# Patient Record
Sex: MALE | Race: WHITE | NOT HISPANIC OR LATINO | Employment: OTHER | URBAN - METROPOLITAN AREA
[De-identification: names, ages, dates, MRNs, and addresses within clinical notes are randomized per-mention and may not be internally consistent; named-entity substitution may affect disease eponyms.]

---

## 2017-01-10 ENCOUNTER — HOSPITAL ENCOUNTER (OUTPATIENT)
Dept: CARDIAC REHAB | Facility: CLINIC | Age: 62
End: 2017-01-10
Attending: PHYSICIAN ASSISTANT
Payer: COMMERCIAL

## 2017-01-10 PROCEDURE — 93798 PHYS/QHP OP CAR RHAB W/ECG: CPT

## 2017-01-10 PROCEDURE — 40000116 ZZH STATISTIC OP CR VISIT

## 2017-01-12 ENCOUNTER — HOSPITAL ENCOUNTER (OUTPATIENT)
Dept: CARDIAC REHAB | Facility: CLINIC | Age: 62
End: 2017-01-12
Attending: PHYSICIAN ASSISTANT
Payer: COMMERCIAL

## 2017-01-12 PROCEDURE — 40000116 ZZH STATISTIC OP CR VISIT: Performed by: REHABILITATION PRACTITIONER

## 2017-01-12 PROCEDURE — 93798 PHYS/QHP OP CAR RHAB W/ECG: CPT | Performed by: REHABILITATION PRACTITIONER

## 2017-01-17 ENCOUNTER — HOSPITAL ENCOUNTER (OUTPATIENT)
Dept: CARDIAC REHAB | Facility: CLINIC | Age: 62
End: 2017-01-17
Attending: PHYSICIAN ASSISTANT
Payer: COMMERCIAL

## 2017-01-17 PROCEDURE — 40000116 ZZH STATISTIC OP CR VISIT: Performed by: REHABILITATION PRACTITIONER

## 2017-01-17 PROCEDURE — 93798 PHYS/QHP OP CAR RHAB W/ECG: CPT | Performed by: REHABILITATION PRACTITIONER

## 2017-01-19 ENCOUNTER — HOSPITAL ENCOUNTER (OUTPATIENT)
Dept: CARDIAC REHAB | Facility: CLINIC | Age: 62
End: 2017-01-19
Attending: PHYSICIAN ASSISTANT
Payer: COMMERCIAL

## 2017-01-19 PROCEDURE — 93798 PHYS/QHP OP CAR RHAB W/ECG: CPT | Performed by: REHABILITATION PRACTITIONER

## 2017-01-19 PROCEDURE — 40000116 ZZH STATISTIC OP CR VISIT: Performed by: REHABILITATION PRACTITIONER

## 2017-01-19 ASSESSMENT — 6 MINUTE WALK TEST (6MWT)
GENDER SELECTION: MALE
TOTAL DISTANCE WALKED (FT): 1563

## 2017-01-24 ENCOUNTER — HOSPITAL ENCOUNTER (OUTPATIENT)
Dept: CARDIAC REHAB | Facility: CLINIC | Age: 62
End: 2017-01-24
Attending: PHYSICIAN ASSISTANT
Payer: COMMERCIAL

## 2017-01-24 PROCEDURE — 93798 PHYS/QHP OP CAR RHAB W/ECG: CPT | Performed by: REHABILITATION PRACTITIONER

## 2017-01-24 PROCEDURE — 40000116 ZZH STATISTIC OP CR VISIT: Performed by: REHABILITATION PRACTITIONER

## 2017-01-31 ENCOUNTER — HOSPITAL ENCOUNTER (OUTPATIENT)
Dept: CARDIAC REHAB | Facility: CLINIC | Age: 62
End: 2017-01-31
Attending: PHYSICIAN ASSISTANT
Payer: COMMERCIAL

## 2017-01-31 PROCEDURE — 40000116 ZZH STATISTIC OP CR VISIT: Performed by: REHABILITATION PRACTITIONER

## 2017-01-31 PROCEDURE — 93798 PHYS/QHP OP CAR RHAB W/ECG: CPT | Performed by: REHABILITATION PRACTITIONER

## 2017-02-02 ENCOUNTER — HOSPITAL ENCOUNTER (OUTPATIENT)
Dept: CARDIAC REHAB | Facility: CLINIC | Age: 62
End: 2017-02-02
Attending: PHYSICIAN ASSISTANT
Payer: COMMERCIAL

## 2017-02-02 VITALS — BODY MASS INDEX: 30.39 KG/M2 | HEIGHT: 69 IN | WEIGHT: 205.2 LBS

## 2017-02-02 PROCEDURE — 40000575 ZZH STATISTIC OP CARDIAC VISIT #2: Performed by: REHABILITATION PRACTITIONER

## 2017-02-02 PROCEDURE — 40000116 ZZH STATISTIC OP CR VISIT: Performed by: REHABILITATION PRACTITIONER

## 2017-02-02 PROCEDURE — 93797 PHYS/QHP OP CAR RHAB WO ECG: CPT | Performed by: REHABILITATION PRACTITIONER

## 2017-02-02 PROCEDURE — 93798 PHYS/QHP OP CAR RHAB W/ECG: CPT | Performed by: REHABILITATION PRACTITIONER

## 2017-02-02 ASSESSMENT — 6 MINUTE WALK TEST (6MWT)
GENDER SELECTION: MALE
TOTAL DISTANCE WALKED (FT): 1563
TOTAL DISTANCE WALKED (FT): 2029
FEMALE CALC: 1524.97
MALE CALC: 1779.81
PREDICTED: 1790.67

## 2017-02-02 NOTE — PROGRESS NOTES
Issac Jackson 61 year old 1955 02/02/17 0900   Session   Session Discharge Note   Certified through this date 03/04/17   Discharge Note  2/2/17 Pt progressed well. His HR was close to or over his 85% HRmax off and on throughout the program, which limited Pt's ability to progress as much as he wanted. Pt was told to consult with MD about his ability to increase exercise with elevated HR. Pt admitted to having a moderate amount of stress from work and he wanted to learn how to manage this better. Pt attended stress and relaxation class and felt he now knows how to practice relaxation techniques better. He is planning on implementing a few strategies that he learned in rehab. Pt started off with a MET level of 3.3, and reached a MET level of 5.6 during his time in rehab. He plans to exercise 6 days a week for 60 minutes at his home gym. Pt was talked to about exercise guidelines, appropriate progression of exercise, and safety when working out. Pt has lost 5# since starting at rehab and is confident that he will be able to continue to lose weight and meet his long term goal of 190#. Pt's spouse is a good support system and manages his meals and portion sizes. Pt feels he will be able to adhere to this diet and continue to eat smaller portions sizes. Pt has not had a lipid panel done since his event and he was advised to follow up with his MD about having this done within the next month or two.     Cardiac Rehab Assessment 11/17/2016 Evaluation completed. PT  had an inferior MI in early October with a stent to his RCA, About one month later he had a stent in his LAD. During his last procedure both groins were used for the angiogram. He is still sore and does have some bruising. Denies any angina. PT is very motivated to make lifestyle changes to improve his health.  He is actively working on weight loss and has started to exercise at home.  12/1/16  ITP completed today.  PT reports having palpitations he denies  "angina however does have what he calls \"Heart soreness\"  He reports that he had the same feeling after his 1st stent. PT to continue to monitor this symptom.    1-19-17  PT continues to progress his exercise tolerance.  He has asked appropriat questions to understand his training regimine.  He has learned that his HR, effort and breath matter in his exercise tolerance and are used in progression.  PT now wears a heart rate montiro and adjusts his levles of exercise accordingly.  He is now doing intervals in rehab to assist with HR control.  His MD did increase his metoroprolol to 50 mg once daily. from 25mg once daily and encouraged patient to continue to keep his HR at or less than 135 bpm.  PT admits that he is controlling his stress levels better.  He is utilizing deep breathing for relaxation. PT has lost wt consistnely about 2 # per week.  He does admit that he is \"hungry.  We discussed increasing his protein amounts and to reevaluate if he is eating enough calories thorughout the day.  PT denies skipping any meals.  His wife is supportive and keeps a close eye on him.  PT is consistently getting in his home exercise up to 3-4 days per week to average 5-6 days per week.  He is tkaing his one day off per week for rest.  PT is preparing for DC in the next two weeks.  [facility] monitro[facility] and teaching of protocol for home after rehab is done.  2/2/17.  Discharge today.   General Information   Treatment Diagnosis Stent   Date of Treatment Diagnosis 11/08/16   Secondary Treatment Diagnosis STEMI;Systolic Heart Failure with Lowered EF   Significant Past CV History None   Lead up symptoms Chest pain that radiated to left shoulder   Hospital Location [facility]   Hospital Discharge Date 11/08/16   Signs and Symptoms Post Hospital Discharge Other (see comments)  (Soreness in both groin areas post procedure )   Outpatient Cardiac Rehab Start Date 11/17/16 " "  Primary Physician Jim Maurice; Tristan   Primary Physician Follow Up Advised to schedule appointment   Cardiologist Krishna Torres   Cardiologist Follow Up Scheduled   Ejection Fraction 45% %   Risk Stratification Moderate   Summary of Cath Report   Summary of Cath Report Available   Date Performed 11/08/16   LAD 80% mid-GOLDEN   % chronic total occlusion   RCA Stent patent in proximal-placed GOLDEN 10/5/2016-inferior MI: RPLA branch 100% occlusion fills via right to right collaterals   Living and Work Status    Living Arrangements and Social Status house;spouse   Return to Employment Yes   Occupation    Preventative Medications   CMS recommended medications Antiplatelets;Ace inhibitors;Beta Blocker;Lipid Lowering;Influenza vaccination   Falls Screen   Have you fallen two or more times in the past year? No   Have you fallen and had an injury in the past year? No   Pain   Patient Currently in Pain No   Physical Assessments   Incisions WNL   Edema None   Right Lung Sounds normal   Left Lung Sounds normal   Limitations No limitations   Comments Occasional knee pain, bursitis left shoulder   Individualized Treatment Plan   Monitored Sessions Scheduled 18   Monitored Sessions Attended 17   Oxygen   Supplemental Oxygen needed No   Nutrition Management - Weight Management   Assessment Discharge   Age 61   Weight 93.078 kg (205 lb 3.2 oz)   Height 1.753 m (5' 9.02\")   BMI (Calculated) 30.35   Goal Weight 86.183 kg (190 lb)   Initial Rate Your Plate Score. Dietary tool to assess eating patterns. Scores range from 24 to 72. The higher the score the healthier the eating pattern. 58   Discharge Rate Your Plate Score 64   Weight Management Comments PT has lost 15# since his MI-he is eating alot less.  12/1/16  PT has now lost 20# since his MI.  He is eating smaller portions, eating heart healthy. He admittted initially he was not eating enough and exercising hard.  He did not realize that he was pushing too " hard until he came to rehab and his HR was higher than recommneded with exercise.  2/2/17. Pt has lost a total of 5# since the start of rehab. His long term goal weight is 190# and he is continuing to work towards this goal outside of rehab. He recently went on vacation over the weekend and gained 2# but he is planning on exercising 6 days a week for 60 minutes to help him achieve his goal weight.     Nutrition Management - Lipids   Lipids Labs Available   Date 10/05/16   Total Cholesterol 180   Triglycerides 85   HDL 39      Prescribed Lipid Medication Yes   Statin Intensity High Intensity   Lipid Comments 12/1/16  Discussed CHOL values, and benefits of medications beyond lowring the numbers. 12/20/16.  No questions about cholesterol 2/2/17. Pt has not had a recent lipid panel done since his event. It was suggested that he consult with his  about getting this done within the next month or two in order to see how the statin has been working.    Nutrition Management - Diabetes   Diabetes No   Nutrition Management Summary   Dietary Recommendations Low Fat;Low Cholesterol;Low Sodium   Stages of Change for Diet Compliance Maintenance   Interventions Planned Attend Nutrition Education Class(es);Instruct on Label Reading;Educate on Weight Management Principles;Complete Food Diary   Interventions In Progress or Completed Understands Weight Management Principles;Educated on Benefits of Exercise   Patient Goals Goal #1   Goal #1 Description PT will lose 10# by making dietary changes and exercising.   Goal #1 Target Date 01/17/17   Goal #1 Progress Towards Goal 12/1/16  [facility]  He reports he lost most of his wt between stent 1 and 2.  PT feels that once he is safe to exercise harder the wt will come off again.  he is looking to get under 200#.  12/20/16.  Pt has lost abou 5 ounds since starting [facility].  He states his wife is doing the shopping and preparing ,meals.  He  understands label reading and is aware of the foods his wife is purchasing. Diet changes include eating smaller portions, more fish and chicken, decreasing sodium and fat intake.  He is planning to attend nutrition education classes and will consider individual consultation. 2/2/17. Pt has lost a total of 5# since the start of rehab. His target weight is 190# and he is going to work towards this goal by exercising 6 days a week for 60minutes, and continuing to eat healthy portion sizes.  He feels confident that he will achieve this goal.   Nutrition Summary Comments Wife has purchased 3-4 heart healthy cookbooks and she is limiting his portions. 2/2/17. Pt's wife is continuing to manage his diet and he has been compliant thus far and believes he will be able to maintain this diet.    Nutrition Target Outcome Weight loss .5-1 lb/week (if BMI > 25);Total Chol < 150, HDL > 40 (M), HDL > 50 (W), LDL < 70, Trig < 150   Psychosocial Management   Psychosocial Assessment Discharge   Is there history of clinical depression or increased risk of depression? No previous history   Current Level of Stress per Patient Report Moderate    Current Coping Skills Has Positive Support System;Other (see comments)  (Pt is going to try some stress management techniques. )   Initial Patient Health Questionnaire -9 Score (PHQ-9) for depression. 5-9 Minimal symptoms, 10-14 Minor depression, 15-19 Major depression, moderately severe, > 20 Major depression, severe  1   Discharge PHQ-9 Score for Depression 0   Initial [employer] COOP Survey score.  Quality of Life:   If total score > 25 review individual areas where patient rated a 4 or 5.  Consider patients current medical condition and what role that plays on the score.   Adjust treatment protocol to improve areas of concern.  Consider the following:  PHQ9 score, DASI, and re-assessment within the next 30 days to assist with developing treatments.  18   Discharge [employer] COOP Survey Score 13    Stages of Change Preparation   Interventions Planned Patient to verbalize understanding of negative impact of stress to personal health;Patient interested in implementing one strategy to reduce current level of stress/anxiety;Patient to verbalize understanding of behavioral assessment results;Patient to attend stress management class(es)   Interventions In Progress or Completed Patient verbalizes understanding of behavioral assessment scores;Patient verbalizes understanding of negative impact of stress to personal health;Patient attended stress management class(es);Patient is making progress with personal goal to reduce stress/anxiety;Patient was given resources for stress relaxation   Patient Goal Yes   Goal Description PT would like to learn at least one way to better manage his stress levels by attending the relaxation class.   Goal Target Date 12/30/16   Goal Date Met 02/02/17   Progress Towards Goal 12/1/16  PT was given relaxation techniques handout today.  We discussed stress affects on the body and the different nervous systems and goal of establishing both ot them.   PT admits that he is a worrier.  12/20/16.  Did not address.  However, PT did state that everything is going well. 2/2/17. Pt attended the stress management class and admitted that meditation did not seem like a good fit. Pt was given other relaxation tools and techniques to practice outside of rehab. He said he will try to use stretching as a stress management tool.  Although he learned relaxation techniques, he has not put any of them into practice   Psychosocial Comments PT has some anxiety and stress. Stress is job related and he tends to be worrier. 2/2/17 Pt is going to try to manage stress via stretching and taking time for himself.    Psychosocial Target Outcome Maximize coping skills;Identify absence or presence of depression using valid screening tool   Other Core Components - Hypertension   History of or Diagnosis of Hypertension  Yes   Currently taking Anti-Hypertensives Yes;Beta blocker;Ace Inhibitor   Hypertension Comments 11/17/2016 PT reports his BP tends to be higher when he initially gets it checked.  12/0/16  BP steypcially in the high 120's low 130s.   645 am question if medications are fully in system.  12/20/16.  At times BP is mildly elevated   Continuing to monitor BP nad will follow-up with MD as appropriate. 2/2/17. Pt to talk with MD about medication dose and whether or not his BP should be lower than 140/80.   Other Core Components - Tobacco   History of Tobacco Use Yes   Quit Date or Planned Quit Date (quit in 1996)   Tobacco Use Status Former (Quit > 6 mo ago)   Tobacco Habit Cigarettes   Tobacco Use per Day (average) 2   Years of Tobacco Use 25   Stages of Change Maintenance   Tobacco Comments 12/1/16  No issues with maintenance. 2/2/17 Pt is continuing to abstain and has quit the nicotine gum since his event.    Other Core Components Summary   Interventions Planned Instruct patient on the DASH diet;List benefits of weight management;Educate on importance of maintaining low sodium diet   Interventions In Progress or Completed Listed benefits of weight management;Instructed on the importance of limiting alcohol intake   Patient Goals No   Other Core Components Target Outcome BP < 140/90 or < 130/80 with DM or CKD   Activity/Exercise History   Activity/Exercise Assessment Discharge   Activity/Exercise Status prior to event? Participated in an Exercise Program   Number of Days Currently participating in Moderate Physical Activity? 3-5   Number of Days Currently performing  Aerobic Exercise (including rehab)? 4-6   Number of Minutes per Session Currently of Aerobic Exercise (average)? 20-40   Current Stage of Change (Physical Activity) Action   Current Stage of Change (Aerobic Exercise) Maintenance   Patient Goals Goal #1;Goal #2   Goal #1 Description 12/20/16  Goal adjusted to: learn about safe parameters for exercise.   Goal  #1 Target Date 12/30/16   Goal #1 Date Met 02/02/17   Goal #1 Progress Towards Goal 12/1/16 PT was running up to 2 days prior to his MI>  We discussed at this time keepig his HR <130 bpm at home as well as in rehab.  Discussed not jogging at this time until 6 weeks post MI.  2/2/17. Pt progressed well and would like to do more, however his HR has been limiting him. Pt to continue to monitor HR and consult with MD about whether or not he can reach a HR above 85% HRmax safely.  Discussed safe exercise progression.   Goal #2 Description Participate in HIIT and learn and understand how to exercise safely this way.   Goal #2 Target Date 01/26/17   Goal #2 Progress Towards Goal 12/20/16.  This goal was established today.  Talked with PT about how HIIT works and the outcomes that can be ahieved by exercising this way.  He explained that previous to his heart event he would run for about 1 minute between strength training sets.  Had long discussion about strength training paameters and progression as well as how HIIT is performed.  Therapist will review policy and start HIIT training with patient per protocol. 2/2/17 Pt understands HIIT and how to safely perform it. Pt suggested to talk with his MD about increasing to HIIT once he is able to and his HR is under control.    Activity/Exercise Comments PT has a home treadmill and walked 3-4 days per week for 30 minutes previously. When he started to have chest pain he would exercise to stay below the threshold for angina. 2/2/17 Pt is planning on exercising at his home gym and increasing to 6 days a week for 60minutes a day.    Activity/Exercise Target Outcome An Accumulation of 150  Minutes of Aerobic Activity per Week   Exercise Assessment   6 Minute Walk Predicted - Gender Selection Male   6 Minute Walk Predicted (Male) 1779.81   6 Minute Walk Predicted (Female) 1524.97   Initial 6 Minute Walk Distance (Feet) 1563 ft   Discharge 6 Minute Walk Distance (Feet) 2029   Resting  HR 87 bpm   Exercise  bpm   Post Exercise HR 96 bpm   Resting /80 mmHg   Exercise /86 mmHg   Post Exercise /80 mmHg   Effort Rating 5   Current MET Level 5.6   MET Level Goal 6-7   ECG Rhythm Sinus rhythm   Ectopy PVCs  (Rare PVCs)   Current Symptoms Denies symptoms   Limitations/Restrictions None   Exercise Prescription   Mode Treadmill;Weights   Duration/Time 30-45 min   Frequency 3 daysweek   THR (85% of age predicted max HR) 134.3   OMNI Effort Rating (0-10 Scale) 4-6/10   Progression Continuous bouts;Total exercise time of 30-45 minutes;Aerobic exercise to OMNI rating of 6 or below and at or below THR;Progress peak intensity by 1/2 MET per week   Comments 12/20/16  Start HIIT per protocol.  PT will also continue with strength training at home. 2/2/17 PT discharged today.    Recommended Home Exercise   Type of Exercise Walking;Treadmill;Weights   Frequency (days per week) 4-6   Duration (minutes per session) 45-60 min aerobic exercise   Effort Rating Recommended 4-6/10   30 Day Exercise Plan PT start with 15 minutes of walking at up to 3 mph and increase to 30 minutes over the next 5-7 days.   12/1/16  PT to achieve 4-6 days of exercise inclduing what is completed in rehab per week   12/20/16.  Progressing home exercsie along with rehab progression. 2/2/17 Pt to exercise at home 4-6 days a week for 30-60 minutes a day. Pt to monitor HR, and breathing rate during exercise to know how hard he should exercise.     Current Home Exercise   Type of Exercise Walking;Weights;Treadmill   Frequency (days per week) 6   Duration (minutes per session) 60   Follow-up/On-going Support   Provider follow-up needed on the following Heart Rate   Learning Assessment   Learner Patient   Primary Language English   Preferred Learning Style Reading;Demonstration   Barriers to Learning No barriers noted   Patient Education   Education recommended Anatomy and Physiology of the Heart;Blood Pressure;Exercise  Principles;Medication Overview;Muscle Conditioning;Nutrition;Stress Management;Weight Loss   Education classes attended Stress Management   I have established, reviewed and made necessary changes to the individualized treatment plan and exercise prescription for this patient.    Physician Name (printed): ________________________   Date: _______  Time: ______    Physician Signature: ___________________________________________

## 2023-01-03 ENCOUNTER — HOSPITAL ENCOUNTER (EMERGENCY)
Facility: CLINIC | Age: 68
Discharge: HOME | End: 2023-01-03
Attending: EMERGENCY MEDICINE | Admitting: EMERGENCY MEDICINE
Payer: COMMERCIAL

## 2023-01-03 VITALS
TEMPERATURE: 97.3 F | SYSTOLIC BLOOD PRESSURE: 191 MMHG | RESPIRATION RATE: 18 BRPM | BODY MASS INDEX: 29.52 KG/M2 | OXYGEN SATURATION: 98 % | DIASTOLIC BLOOD PRESSURE: 105 MMHG | HEART RATE: 64 BPM | WEIGHT: 200 LBS

## 2023-01-03 DIAGNOSIS — R04.0 EPISTAXIS: ICD-10-CM

## 2023-01-03 PROCEDURE — 99284 EMERGENCY DEPT VISIT MOD MDM: CPT | Mod: 25

## 2023-01-03 PROCEDURE — 30903 CONTROL OF NOSEBLEED: CPT | Mod: RT

## 2023-01-03 RX ORDER — OXYMETAZOLINE HYDROCHLORIDE 0.05 G/100ML
SPRAY NASAL
Status: DISCONTINUED
Start: 2023-01-03 | End: 2023-01-03 | Stop reason: HOSPADM

## 2023-01-03 RX ORDER — TRANEXAMIC ACID 100 MG/ML
500 INJECTION, SOLUTION INTRAVENOUS ONCE
Status: DISCONTINUED | OUTPATIENT
Start: 2023-01-03 | End: 2023-01-03 | Stop reason: HOSPADM

## 2023-01-03 ASSESSMENT — ACTIVITIES OF DAILY LIVING (ADL)
ADLS_ACUITY_SCORE: 35
ADLS_ACUITY_SCORE: 35

## 2023-01-03 NOTE — ED NOTES
Pt ambulated without difficult. Trickle of blood from nose since placement of rhino rocket, no increase from ambulation.

## 2023-01-03 NOTE — ED PROVIDER NOTES
History     Chief Complaint:  Epistaxis       HPI   Issac Jackson is a 67 year old male who presents with nose bleed.  Patient reports onset of nosebleed this morning.  He has had 1 prior nosebleed several years ago that resolved on its own.  He held pressure but continued to have a significant trickle.  No fall or trauma.  No recent cough, runny nose, sore throat.  He is not reported picking his nose.  He does report a history of whitecoat syndrome and has elevated blood pressures when he sees the doctor.  He denies any other symptoms at this time.      Independent Historian: Yes as above.    Review of External Notes: I reviewed cardiology clinic note from 12/13/2022.  It appears the patient is on Brilinta and aspirin. CBC within normal limits from this visit.      ROS:  Review of Systems   All other systems reviewed and are negative.    Allergies:  No known drug allergies    Medications:    Toprol XL  Lisinopril  Brilinta  Aspirin 81 mg  Nitroglycerin    Past Medical History:    Hypertension  Atherosclerosis of coronary artery  Ischemic cardiomyopathy  Stentded coronary artery  STEMI, right    Past Surgical History:   Hernia repair     Family History:    Father: Kidney/bladder disease, stroke  Mother: Cancer    Social History:      PCP: Clinic, [facility]     Physical Exam     Patient Vitals for the past 24 hrs:   BP Temp Temp src Pulse Resp SpO2 Weight   01/03/23 1126 (!) 191/105 -- -- 64 -- 98 % --   01/03/23 1111 (!) 203/104 -- -- -- -- -- --   01/03/23 1056 (!) 163/94 -- -- -- -- -- --   01/03/23 1041 (!) 173/98 -- -- -- -- -- --   01/03/23 1026 (!) 157/96 -- -- -- -- -- --   01/03/23 1011 (!) 185/102 -- -- -- -- -- --   01/03/23 1000 (!) 178/104 -- -- 67 -- -- --   01/03/23 0945 (!) 182/97 -- -- 68 -- -- --   01/03/23 0930 (!) 201/109 -- -- 67 -- -- --   01/03/23 0921 (!) 181/104 -- -- -- -- -- --   01/03/23 0906 (!) 196/99 -- -- -- -- -- --   01/03/23 0851 (!) 202/111 -- -- -- -- -- --    01/03/23 0836 (!) 204/110 -- -- -- -- -- --   01/03/23 0821 (!) 207/122 -- -- 71 -- -- --   01/03/23 0812 (!) 215/109 -- -- -- -- -- --   01/03/23 0811 -- 97.3  F (36.3  C) Temporal 81 18 97 % 90.7 kg (200 lb)        Physical Exam  General: Resting on the bed.  Head: No obvious trauma to head.  Ears, Nose, Throat:  External ears normal.  Nose normal.  Posterior oral pharynx with mild blood noted.  Midline uvula.  right nare with blood no focal site of bleeding.  Left nare clear.    Eyes:  Conjunctivae clear.  Pupils are equal, round, and reactive.   Neck: Normal range of motion.  Neck supple.   CV: Regular rate and rhythm.  No murmurs.      Respiratory: Effort normal and breath sounds normal.  No wheezing or crackles.   Gastrointestinal: Soft.  No distension. There is no tenderness.    Neuro: Alert. Moving all extremities appropriately.  Normal speech.    Skin: Skin is warm and dry.  No rash noted.     Emergency Department Course     Procedures       Epistaxis Care     Procedure: Epistaxis Care    Indication: Epistaxis    Consent: Verbal    Medication: Patient was topically medicated with Tranexamic acid, Topical lidocaine and Oxymetazoline    Procedure detail: Pre-wetted Rapid Rhino (balloon) was gently inserted and insufflated. Anterior rapid rhino.   Patient was closely monitored and did not have evidence of recurrent bleeding.     Patient Status: The patient tolerated the procedure well: Yes. There were no complications.    Emergency Department Course & Assessments:       Interventions:  Medications   oxymetazoline (AFRIN) 0.05 % spray (has no administration in time range)   tranexamic acid (CYKLOKAPRON) spray 500 mg (has no administration in time range)        Independent Interpretation (X-rays, CTs, rhythm strip):  N/A    Consultations/Discussion of Management or Tests:  0825  I met with patient to review history and plan    0915  I rechecked the patient, bleeding slowed    1010  I placed TXA in the nose    1110 I rechecked the patient and placed packing in the patient's nose.  1150 I rechecked the patient and prepared for discharge.    Social Determinants of Health affecting care:  N/A    Disposition:  The patient was discharged to home.     Impression & Plan      Medical Decision Makin-year-old male with history of CAD on Brilinta and aspirin presents with a nosebleed.  Patient is hypertensive, he is high blood pressure.  He reports compliance with his medication.  He states that he does get whitecoat syndrome and typically experiences elevated blood pressures in the ER.  He is currently asymptomatic.  He did not endorse any symptoms to suggest hypertensive urgency or emergency.  His chief complaint is a nosebleed.  Broad differential was pursued including but not limited to coagulopathy, trauma, tumor, mass, bleed, etc.  Patient is well-appearing nontoxic.  CBC less than 3 weeks ago was reassuring with normal platelets and hemoglobin.  He has not any anticoagulants outside of Brilinta and aspirin.  There is no signs of trauma.  No obvious mass on examination.  Patient has just been oozing from the right nare.  Attempted Afrin, TXA topically without significant change in symptoms.  Therefore opted to place Rhino Rocket which patient tolerated quite well.  Following placement there was no further bleeding.  Patient was given Augmentin for discharge.  Advised to follow-up with ENT in the next 2 to 3 days.  Return precautions were given.  Patient is agreeable this plan and felt comfortable plan for discharge home.      Diagnosis:    ICD-10-CM    1. Epistaxis  R04.0            Discharge Medications:  New Prescriptions    AMOXICILLIN-CLAVULANATE (AUGMENTIN) 875-125 MG TABLET    Take 1 tablet by mouth 2 times daily for 5 days      Scribe Disclosure:  Andre TILLEY, am serving as a scribe on 1/3/2023 at 11:50 AM to personally document services performed by Yandy Alexander MD based on my observations  and the provider's statements to me.     1/3/2023   Yandy Alexander MD Bennett, Jennifer L, MD  01/03/23 3307

## 2023-01-03 NOTE — ED TRIAGE NOTES
A&O x4, ABCs intact. Pt presents with nose bleed that started at 0700 this morning. Pt states that he is on blood thinner.        Triage Assessment     Row Name 01/03/23 0811       Triage Assessment (Adult)    Airway WDL WDL       Respiratory WDL    Respiratory WDL WDL       Cardiac WDL    Cardiac WDL WDL

## 2023-01-03 NOTE — DISCHARGE INSTRUCTIONS
"Please watch for signs of continued bleeding and be careful to leave packing in place.  If you have worsening pain, headaches, vision changes, sinus pain, purulent drainage or bleeding recurrence, return to the ED.  You are prescribed a antibiotic to take as long as the packing is in place.      Call ENT today to make a follow up appointment in 2-3 days    Discharge Instructions  Epistaxis    Today you were seen for a nosebleed.   Nosebleeds (the medical term is \"epistaxis\") are very common. Almost every person has had at least one in their lifetime.  Although the amount of blood loss can appear dramatic, nosebleeds rarely cause serious problems.  The most common causes are dry air or nose picking, but they also are common in people who have allergies, high blood pressure, or are on blood thinners (such as Coumadin, Aspirin or Plavix). If you or your child gets a nosebleed, the important thing is to know how to take care of it. With the right self-care, most nosebleeds will stop on their own.    Generally, every Emergency Department visit should have a follow-up clinic visit with either a primary or a specialty clinic/provider. Please follow-up as instructed by your emergency provider today.    Return to the Emergency Department if:  Your nose is bleeding a very large amount of blood and you are unable to stop it.  You get very pale, faint, or tired.  You cannot get the bleeding to stop after following these instructions.    Treatment:  Your provider may tell you to use a decongestant nose spray, like Afrin  (oxymetazoline), in both nostrils in the morning and at night for the next three days. Do not use this medicine for more than three days at a time.  If you do, it will cause nasal congestion.   Use a moisturizer. A small amount of Vaseline  to the inside of your nostrils for moisture before bed is one option. There are nasal sprays available over-the-counter for this purpose as well.  Using a humidifier in your " bedroom or home will help as well when the air is dry.  For the next three days, do not blow your nose or put anything in your nose. You may sniffle, or dab the outside of your nose.  Do not bend with your head below your waist for the next three days. Do not lift anything so heavy that you have to strain.   If you received nasal packing, please do not remove the packing until seen by an Ear, Nose, and Throat (ENT) specialist.  If antibiotics have been given with the packing, please take as directed.    If your nose starts to bleed again:  Blow your nose to get rid of some of the clots that have formed inside your nostrils. This may increase the bleeding temporarily, but that is okay.  Spray decongestant nose spray (like Afrin ) into both nostrils to constrict the blood vessels.  Sit or stand while bending forward slightly at the waist. Do not lie down or tilt your head back. This may cause you to swallow blood and can lead to vomiting.   the soft part of BOTH nostrils at the bottom of your nose and squeeze your nose closed for at least 5 minutes (for children) or 10 to 15 minutes (for adults). Use a clock to time yourself. Do not release the pressure every so often to check whether the bleeding has stopped. Many people hurt their chances of stopping the bleeding by releasing the pressure too soon or too often.    If you follow the steps outlined above, and your nose continues to bleed, repeat all the steps once more. Apply pressure for a total of at least 30 minutes. If you continue to bleed even then, seek medical attention.  If you were given a prescription for medicine here today, be sure to read all of the information (including the package insert) that comes with your prescription.  This will include important information about the medicine, its side effects, and any warnings that you need to know about.  The pharmacist who fills the prescription can provide more information and answer questions you may  have about the medicine.  If you have questions or concerns that the pharmacist cannot address, please call or return to the Emergency Department.   Remember that you can always come back to the Emergency Department if you are not able to see your regular provider in the amount of time listed above, if you get any new symptoms, or if there is anything that worries you.

## 2023-04-01 ENCOUNTER — HEALTH MAINTENANCE LETTER (OUTPATIENT)
Age: 68
End: 2023-04-01

## 2023-11-09 ENCOUNTER — HOSPITAL ENCOUNTER (EMERGENCY)
Facility: CLINIC | Age: 68
Discharge: HOME | End: 2023-11-10
Attending: EMERGENCY MEDICINE | Admitting: EMERGENCY MEDICINE
Payer: COMMERCIAL

## 2023-11-09 ENCOUNTER — APPOINTMENT (OUTPATIENT)
Dept: GENERAL RADIOLOGY | Facility: CLINIC | Age: 68
End: 2023-11-09
Attending: EMERGENCY MEDICINE
Payer: COMMERCIAL

## 2023-11-09 ENCOUNTER — APPOINTMENT (OUTPATIENT)
Dept: CT IMAGING | Facility: CLINIC | Age: 68
End: 2023-11-09
Attending: EMERGENCY MEDICINE
Payer: COMMERCIAL

## 2023-11-09 DIAGNOSIS — R91.1 PULMONARY NODULE: ICD-10-CM

## 2023-11-09 DIAGNOSIS — R07.9 CHEST PAIN, UNSPECIFIED TYPE: ICD-10-CM

## 2023-11-09 DIAGNOSIS — I10 HYPERTENSION, UNSPECIFIED TYPE: ICD-10-CM

## 2023-11-09 LAB
ANION GAP SERPL CALCULATED.3IONS-SCNC: 10 MMOL/L (ref 7–15)
BASOPHILS # BLD AUTO: 0.1 10E3/UL (ref 0–0.2)
BASOPHILS NFR BLD AUTO: 1 %
BUN SERPL-MCNC: 14.7 MG/DL (ref 8–23)
CALCIUM SERPL-MCNC: 9.4 MG/DL (ref 8.8–10.2)
CHLORIDE SERPL-SCNC: 104 MMOL/L (ref 98–107)
CREAT SERPL-MCNC: 1.37 MG/DL (ref 0.67–1.17)
DEPRECATED HCO3 PLAS-SCNC: 28 MMOL/L (ref 22–29)
EGFRCR SERPLBLD CKD-EPI 2021: 56 ML/MIN/1.73M2
EOSINOPHIL # BLD AUTO: 0.2 10E3/UL (ref 0–0.7)
EOSINOPHIL NFR BLD AUTO: 3 %
ERYTHROCYTE [DISTWIDTH] IN BLOOD BY AUTOMATED COUNT: 12.8 % (ref 10–15)
FLUAV RNA SPEC QL NAA+PROBE: NEGATIVE
FLUBV RNA RESP QL NAA+PROBE: NEGATIVE
GLUCOSE SERPL-MCNC: 109 MG/DL (ref 70–99)
HCT VFR BLD AUTO: 43.8 % (ref 40–53)
HGB BLD-MCNC: 14.6 G/DL (ref 13.3–17.7)
HOLD SPECIMEN: NORMAL
IMM GRANULOCYTES # BLD: 0 10E3/UL
IMM GRANULOCYTES NFR BLD: 0 %
LYMPHOCYTES # BLD AUTO: 1.9 10E3/UL (ref 0.8–5.3)
LYMPHOCYTES NFR BLD AUTO: 26 %
MAGNESIUM SERPL-MCNC: 2 MG/DL (ref 1.7–2.3)
MCH RBC QN AUTO: 30.4 PG (ref 26.5–33)
MCHC RBC AUTO-ENTMCNC: 33.3 G/DL (ref 31.5–36.5)
MCV RBC AUTO: 91 FL (ref 78–100)
MONOCYTES # BLD AUTO: 0.7 10E3/UL (ref 0–1.3)
MONOCYTES NFR BLD AUTO: 9 %
NEUTROPHILS # BLD AUTO: 4.5 10E3/UL (ref 1.6–8.3)
NEUTROPHILS NFR BLD AUTO: 61 %
NRBC # BLD AUTO: 0 10E3/UL
NRBC BLD AUTO-RTO: 0 /100
NT-PROBNP SERPL-MCNC: 500 PG/ML (ref 0–900)
PLATELET # BLD AUTO: 351 10E3/UL (ref 150–450)
POTASSIUM SERPL-SCNC: 4 MMOL/L (ref 3.4–5.3)
RBC # BLD AUTO: 4.81 10E6/UL (ref 4.4–5.9)
RSV RNA SPEC NAA+PROBE: NEGATIVE
SARS-COV-2 RNA RESP QL NAA+PROBE: NEGATIVE
SODIUM SERPL-SCNC: 142 MMOL/L (ref 135–145)
TROPONIN T SERPL HS-MCNC: 10 NG/L
TROPONIN T SERPL HS-MCNC: 10 NG/L
WBC # BLD AUTO: 7.5 10E3/UL (ref 4–11)

## 2023-11-09 PROCEDURE — 74177 CT ABD & PELVIS W/CONTRAST: CPT

## 2023-11-09 PROCEDURE — 83880 ASSAY OF NATRIURETIC PEPTIDE: CPT | Performed by: EMERGENCY MEDICINE

## 2023-11-09 PROCEDURE — 87637 SARSCOV2&INF A&B&RSV AMP PRB: CPT | Performed by: EMERGENCY MEDICINE

## 2023-11-09 PROCEDURE — 36415 COLL VENOUS BLD VENIPUNCTURE: CPT | Performed by: EMERGENCY MEDICINE

## 2023-11-09 PROCEDURE — 250N000011 HC RX IP 250 OP 636: Performed by: EMERGENCY MEDICINE

## 2023-11-09 PROCEDURE — 84484 ASSAY OF TROPONIN QUANT: CPT | Mod: 91 | Performed by: EMERGENCY MEDICINE

## 2023-11-09 PROCEDURE — 80048 BASIC METABOLIC PNL TOTAL CA: CPT | Performed by: EMERGENCY MEDICINE

## 2023-11-09 PROCEDURE — 85025 COMPLETE CBC W/AUTO DIFF WBC: CPT | Performed by: EMERGENCY MEDICINE

## 2023-11-09 PROCEDURE — 99285 EMERGENCY DEPT VISIT HI MDM: CPT | Mod: 25

## 2023-11-09 PROCEDURE — 93005 ELECTROCARDIOGRAM TRACING: CPT

## 2023-11-09 PROCEDURE — 83735 ASSAY OF MAGNESIUM: CPT | Performed by: EMERGENCY MEDICINE

## 2023-11-09 PROCEDURE — 71046 X-RAY EXAM CHEST 2 VIEWS: CPT

## 2023-11-09 RX ORDER — CLOPIDOGREL BISULFATE 75 MG/1
75 TABLET ORAL
Status: ON HOLD | COMMUNITY
Start: 2023-01-04 | End: 2024-07-25

## 2023-11-09 RX ORDER — LISINOPRIL 20 MG/1
40 TABLET ORAL DAILY
COMMUNITY
End: 2024-09-04

## 2023-11-09 RX ORDER — ATORVASTATIN CALCIUM 80 MG/1
80 TABLET, FILM COATED ORAL DAILY
COMMUNITY
End: 2024-09-04

## 2023-11-09 RX ORDER — IOPAMIDOL 755 MG/ML
500 INJECTION, SOLUTION INTRAVASCULAR ONCE
Status: COMPLETED | OUTPATIENT
Start: 2023-11-09 | End: 2023-11-09

## 2023-11-09 RX ORDER — METOPROLOL SUCCINATE 50 MG/1
50 TABLET, EXTENDED RELEASE ORAL DAILY
COMMUNITY
End: 2024-09-04

## 2023-11-09 RX ADMIN — IOPAMIDOL 72 ML: 755 INJECTION, SOLUTION INTRAVENOUS at 21:46

## 2023-11-09 ASSESSMENT — ACTIVITIES OF DAILY LIVING (ADL)
ADLS_ACUITY_SCORE: 35

## 2023-11-10 VITALS
SYSTOLIC BLOOD PRESSURE: 177 MMHG | DIASTOLIC BLOOD PRESSURE: 115 MMHG | OXYGEN SATURATION: 95 % | HEART RATE: 68 BPM | RESPIRATION RATE: 16 BRPM | TEMPERATURE: 98.5 F

## 2023-11-10 LAB
ATRIAL RATE - MUSE: 75 BPM
DIASTOLIC BLOOD PRESSURE - MUSE: NORMAL MMHG
INTERPRETATION ECG - MUSE: NORMAL
P AXIS - MUSE: 30 DEGREES
PR INTERVAL - MUSE: 158 MS
QRS DURATION - MUSE: 104 MS
QT - MUSE: 396 MS
QTC - MUSE: 442 MS
R AXIS - MUSE: -6 DEGREES
SYSTOLIC BLOOD PRESSURE - MUSE: NORMAL MMHG
T AXIS - MUSE: -10 DEGREES
VENTRICULAR RATE- MUSE: 75 BPM

## 2023-11-10 NOTE — ED PROVIDER NOTES
History     Chief Complaint:  Chest Pain       HPI   Issac Jackson is a 68 year old male with a history of CAD status post stenting who presents with chest pain.  The patient states that he had an echocardiogram 2 days ago at which time he noted his systolic blood pressure to be in the 180s.  He has a history of elevated blood pressure during medical encounters so he did not think much of it.  He had taken his blood pressure on Monday the day prior to the echo and it was in the 120s systolic.  He continued to take his blood pressure over the last couple of days and it has been persistently elevated even at home.  This afternoon a bit after 4:00 he noted some brief episodes of sharp central chest pain.  These did resolve spontaneously and he is currently chest pain-free.  He denies any associated shortness of breath, radiation to the left shoulder, radiation to the back, radiation to the jaw, nausea, vomiting, fever, cough.  Denies headache, changes to vision or speech, extremity weakness or numbness.  He states that the symptoms do not feel similar to his persistent chest pressure that he experienced with his prior heart attack.  Denies history of DVT/PE, no history of cancer.      Independent Historian:   None - Patient Only    Review of External Notes:   Echocardiogram from 11/7/2023 reviewed.  LVEF 50 to 55%.  Base mid inferior severe hypokinesis.  Overall no change from prior study on 2/13/2017.      Medications:    clopidogrel (PLAVIX) 75 MG tablet  atorvastatin (LIPITOR) 80 MG tablet  HYDROCHLOROTHIAZIDE PO  lisinopril (ZESTRIL) 20 MG tablet  metoprolol succinate ER (TOPROL XL) 50 MG 24 hr tablet  NONFORMULARY        Past Medical History:    Past Medical History:   Diagnosis Date    Hypertension        Past Surgical History:    Past Surgical History:   Procedure Laterality Date    HERNIA REPAIR          Physical Exam   Patient Vitals for the past 24 hrs:   BP Temp Temp src Pulse Resp SpO2   11/09/23 1819  (!) 209/114 -- -- 79 16 97 %   11/09/23 1817 -- 98.5  F (36.9  C) Temporal -- -- --        Physical Exam  General: Laying on the ED bed  HEENT: Normocephalic, atraumatic  Cardiac: Radial pulses 2+, regular rate and rhythm  Pulm: Breathing comfortably, no accessory muscle usage, no conversational dyspnea, and lungs clear bilaterally  MSK: No bony deformities  Skin: Warm and dry  Neuro: Moves all extremities  Psych: Pleasant mood and affect      Emergency Department Course   ECG  ECG taken at 1837, ECG read at 1912  Normal sinus rhythm  STEMI has resolved as compared to prior, dated 10/5/16.  Rate 75 bpm. ME interval 158 ms. QRS duration 104 ms. QT/QTc 396/442 ms. P-R-T axes 30 -6 -10.     Imaging:  XR Chest 2 Views   Final Result   IMPRESSION: Shallow inspiration. Mild torsion aorta. Normal heart size. Coronary arterial stent. There are mild interstitial infiltrates in the lung bases which are new.      CT Aortic Survey w Contrast    (Results Pending)          Laboratory:  Labs Ordered and Resulted from Time of ED Arrival to Time of ED Departure   BASIC METABOLIC PANEL - Abnormal       Result Value    Sodium 142      Potassium 4.0      Chloride 104      Carbon Dioxide (CO2) 28      Anion Gap 10      Urea Nitrogen 14.7      Creatinine 1.37 (*)     GFR Estimate 56 (*)     Calcium 9.4      Glucose 109 (*)    TROPONIN T, HIGH SENSITIVITY - Normal    Troponin T, High Sensitivity 10     MAGNESIUM - Normal    Magnesium 2.0     TROPONIN T, HIGH SENSITIVITY - Normal    Troponin T, High Sensitivity 10     NT PROBNP INPATIENT - Normal    N terminal Pro BNP Inpatient 500     CBC WITH PLATELETS AND DIFFERENTIAL    WBC Count 7.5      RBC Count 4.81      Hemoglobin 14.6      Hematocrit 43.8      MCV 91      MCH 30.4      MCHC 33.3      RDW 12.8      Platelet Count 351      % Neutrophils 61      % Lymphocytes 26      % Monocytes 9      % Eosinophils 3      % Basophils 1      % Immature Granulocytes 0      NRBCs per 100 WBC 0       Absolute Neutrophils 4.5      Absolute Lymphocytes 1.9      Absolute Monocytes 0.7      Absolute Eosinophils 0.2      Absolute Basophils 0.1      Absolute Immature Granulocytes 0.0      Absolute NRBCs 0.0     INFLUENZA A/B, RSV, & SARS-COV2 PCR        Procedures   None    Emergency Department Course & Assessments:             Interventions:  Medications - No data to display     Assessments:   initial evaluation    Independent Interpretation (X-rays, CTs, rhythm strip):  Chest x-ray shows mild interstitial edema.    Consultations/Discussion of Management or Tests:  None        Social Determinants of Health affecting care:   None    Disposition:  Care of the patient was transferred to my colleague Dr. Estevez pending CT aortogram.     Impression & Plan    CMS Diagnoses: None      Medical Decision Makin-year-old male with history of CAD presents with chest pain as above.  Symptoms are somewhat atypical with regard to cardiac chest pain--sharp, brief intermittent episodes.  The patient is indeed hypertensive in the ED.  EKG is thankfully nonischemic.  A chest x-ray is obtained and shows interstitial infiltrates and a tortuous aorta.  The patient's symptoms initially did not seem consistent with aortic pathology given their intermittent nature.  However given the chest x-ray findings and the patient's acute hypertension, a CT aortogram was ordered and pending at the time of signout.  A viral multiplex was ordered and pending as well.  Troponin is negative.  Given the patient's history, a repeat troponin is indicated and negative.  Overall low suspicion for ACS.  The patient was signed out to my colleague as above pending CT aortogram.  Plan is for discharge home if there are no acute findings on the scan.  Viral multiplex is also pending given the patient's chest x-ray findings.  He may be a Paxlovid candidate if that were to return positive for COVID.      Diagnosis:    ICD-10-CM    1. Chest pain, unspecified  type  R07.9       2. Hypertension, unspecified type  I10               11/9/2023   Jorge Hall MD King, Colin, MD  11/09/23 2126

## 2023-11-10 NOTE — ED TRIAGE NOTES
Pt. Presents to ED with complaints of chest pain, HTN.   Reports intermittent chest pain that is becoming more constant along with high BPs at home for a few days. Had an ECHO on Tuesday and had high BP then,   Heart attack 7 years ago and his most recent Echo showed no changes.   Pt. Reports taking meds for his BP (lisinopril and metoprolol).   A & O x 4, independent  Denies SOB, cough, fevers, palpitations, diaphoresis, swelling.

## 2023-11-10 NOTE — ED NOTES
I received signout from my partner, Dr. Hall.  Please see his H&P for full specifics.  Briefly, the patient presented for chest pain.  He was also hypertensive.  The patient's EKG was reported to be nonischemic, 2 troponins were negative, and the only specific finding on chest x-ray was tortuous aorta.  The patient has a family history of what sounds like an aortic aneurysm so follow-up CT and viral panel were ordered.  I was asked to follow-up on these.    2356: I rechecked and updated the patient.  CT imaging does not reveal any aortic dissection.  There are presumably chronic fibrotic changes but also mention of a 5 mm right lower pulmonary nodule.  The patient notes that he quit smoking approximately 20 years ago.  I will encourage primary care follow-up regarding this, though based on his smoking history, follow-up CT at 12 months could be considered.  I felt he could otherwise be discharged though he certainly can return at anytime with new or worsening symptoms.    CT Aortic Survey w Contrast   Final Result   IMPRESSION:   1.  No aortic dissection.   2.  Bilateral scrotal hydroceles.   3.  Bilateral subpleural reticulonodular opacities presumably chronic fibrotic change.   4.  5 mm right lower lobe pulmonary nodule.      REFERENCE:   Guidelines for Management of Incidental Pulmonary Nodules Detected on CT Images: From the Fleischner Society 2017.    Guidelines apply to incidental nodules in patients who are 35 years or older.   Guidelines do not apply to lung cancer screening, patients with immunosuppression, or patients with known primary cancer.      MULTIPLE NODULES   Nodule size <6 mm   Low-risk patients: No follow-up needed.   High-risk patients: Optional follow-up at 12 months.            XR Chest 2 Views   Final Result   IMPRESSION: Shallow inspiration. Mild torsion aorta. Normal heart size. Coronary arterial stent. There are mild interstitial infiltrates in the lung bases which are new.         Impression:    ICD-10-CM    1. Chest pain, unspecified type  R07.9       2. Hypertension, unspecified type  I10       3. Pulmonary nodule  R91.1                Rey Estevez DO  11/10/23 0009

## 2024-06-02 ENCOUNTER — HEALTH MAINTENANCE LETTER (OUTPATIENT)
Age: 69
End: 2024-06-02

## 2024-07-20 ENCOUNTER — APPOINTMENT (OUTPATIENT)
Dept: CT IMAGING | Facility: CLINIC | Age: 69
DRG: 664 | End: 2024-07-20
Attending: EMERGENCY MEDICINE
Payer: COMMERCIAL

## 2024-07-20 ENCOUNTER — HOSPITAL ENCOUNTER (INPATIENT)
Facility: CLINIC | Age: 69
LOS: 5 days | Discharge: HOME | DRG: 664 | End: 2024-07-25
Attending: EMERGENCY MEDICINE | Admitting: INTERNAL MEDICINE
Payer: COMMERCIAL

## 2024-07-20 DIAGNOSIS — R31.0 GROSS HEMATURIA: ICD-10-CM

## 2024-07-20 LAB
ALBUMIN UR-MCNC: 600 MG/DL
ANION GAP BLD CALCULATED.3IONS-SCNC: 15 MMOL/L (ref 3–14)
ANION GAP SERPL CALCULATED.3IONS-SCNC: 12 MMOL/L (ref 7–15)
APPEARANCE UR: ABNORMAL
BASOPHILS # BLD AUTO: 0.1 10E3/UL (ref 0–0.2)
BASOPHILS NFR BLD AUTO: 1 %
BILIRUB UR QL STRIP: NEGATIVE
BUN SERPL-MCNC: 20 MG/DL (ref 7–30)
BUN SERPL-MCNC: 23.3 MG/DL (ref 8–23)
CA-I BLD-MCNC: 4.9 MG/DL (ref 4.4–5.2)
CALCIUM SERPL-MCNC: 9.1 MG/DL (ref 8.8–10.4)
CHLORIDE BLD-SCNC: 106 MMOL/L (ref 94–109)
CHLORIDE SERPL-SCNC: 105 MMOL/L (ref 98–107)
CO2 BLD-SCNC: 25 MMOL/L (ref 20–32)
COLOR UR AUTO: ABNORMAL
CREAT BLD-MCNC: 1.4 MG/DL (ref 0.7–1.3)
CREAT SERPL-MCNC: 1.51 MG/DL (ref 0.67–1.17)
EGFRCR SERPLBLD CKD-EPI 2021: 50 ML/MIN/1.73M2
EOSINOPHIL # BLD AUTO: 0.6 10E3/UL (ref 0–0.7)
EOSINOPHIL NFR BLD AUTO: 8 %
ERYTHROCYTE [DISTWIDTH] IN BLOOD BY AUTOMATED COUNT: 12.6 % (ref 10–15)
GLUCOSE BLD-MCNC: 125 MG/DL (ref 70–99)
GLUCOSE BLDC GLUCOMTR-MCNC: 119 MG/DL (ref 70–99)
GLUCOSE SERPL-MCNC: 122 MG/DL (ref 70–99)
GLUCOSE UR STRIP-MCNC: 70 MG/DL
HCO3 SERPL-SCNC: 23 MMOL/L (ref 22–29)
HCT VFR BLD AUTO: 41.4 % (ref 40–53)
HCT VFR BLD CALC: 38 % (ref 40–53)
HGB BLD-MCNC: 12.9 G/DL (ref 13.3–17.7)
HGB BLD-MCNC: 13.4 G/DL (ref 13.3–17.7)
HGB UR QL STRIP: ABNORMAL
HOLD SPECIMEN: NORMAL
HOLD SPECIMEN: NORMAL
IMM GRANULOCYTES # BLD: 0 10E3/UL
IMM GRANULOCYTES NFR BLD: 1 %
KETONES UR STRIP-MCNC: NEGATIVE MG/DL
LEUKOCYTE ESTERASE UR QL STRIP: ABNORMAL
LYMPHOCYTES # BLD AUTO: 2.2 10E3/UL (ref 0.8–5.3)
LYMPHOCYTES NFR BLD AUTO: 28 %
MCH RBC QN AUTO: 30.5 PG (ref 26.5–33)
MCHC RBC AUTO-ENTMCNC: 32.4 G/DL (ref 31.5–36.5)
MCV RBC AUTO: 94 FL (ref 78–100)
MONOCYTES # BLD AUTO: 0.8 10E3/UL (ref 0–1.3)
MONOCYTES NFR BLD AUTO: 11 %
NEUTROPHILS # BLD AUTO: 4.2 10E3/UL (ref 1.6–8.3)
NEUTROPHILS NFR BLD AUTO: 53 %
NITRATE UR QL: NEGATIVE
NRBC # BLD AUTO: 0 10E3/UL
NRBC BLD AUTO-RTO: 0 /100
PH UR STRIP: 7.5 [PH] (ref 5–7)
PLATELET # BLD AUTO: 309 10E3/UL (ref 150–450)
POTASSIUM BLD-SCNC: 4.3 MMOL/L (ref 3.4–5.3)
POTASSIUM SERPL-SCNC: 3.8 MMOL/L (ref 3.4–5.3)
PROCALCITONIN SERPL IA-MCNC: 0.07 NG/ML
RBC # BLD AUTO: 4.4 10E6/UL (ref 4.4–5.9)
RBC URINE: >182 /HPF
SODIUM BLD-SCNC: 140 MMOL/L (ref 135–145)
SODIUM SERPL-SCNC: 140 MMOL/L (ref 135–145)
SP GR UR STRIP: 1.03 (ref 1–1.03)
UROBILINOGEN UR STRIP-MCNC: NORMAL MG/DL
WBC # BLD AUTO: 8 10E3/UL (ref 4–11)
WBC CLUMPS #/AREA URNS HPF: PRESENT /HPF
WBC URINE: >182 /HPF

## 2024-07-20 PROCEDURE — 74176 CT ABD & PELVIS W/O CONTRAST: CPT

## 2024-07-20 PROCEDURE — 87086 URINE CULTURE/COLONY COUNT: CPT | Performed by: EMERGENCY MEDICINE

## 2024-07-20 PROCEDURE — 36415 COLL VENOUS BLD VENIPUNCTURE: CPT | Performed by: EMERGENCY MEDICINE

## 2024-07-20 PROCEDURE — 82962 GLUCOSE BLOOD TEST: CPT

## 2024-07-20 PROCEDURE — 84145 PROCALCITONIN (PCT): CPT | Performed by: NURSE PRACTITIONER

## 2024-07-20 PROCEDURE — 81001 URINALYSIS AUTO W/SCOPE: CPT | Performed by: EMERGENCY MEDICINE

## 2024-07-20 PROCEDURE — 258N000003 HC RX IP 258 OP 636: Performed by: EMERGENCY MEDICINE

## 2024-07-20 PROCEDURE — 99222 1ST HOSP IP/OBS MODERATE 55: CPT | Performed by: NURSE PRACTITIONER

## 2024-07-20 PROCEDURE — 250N000013 HC RX MED GY IP 250 OP 250 PS 637: Performed by: NURSE PRACTITIONER

## 2024-07-20 PROCEDURE — 250N000011 HC RX IP 250 OP 636: Performed by: EMERGENCY MEDICINE

## 2024-07-20 PROCEDURE — 80047 BASIC METABLC PNL IONIZED CA: CPT

## 2024-07-20 PROCEDURE — 120N000001 HC R&B MED SURG/OB

## 2024-07-20 PROCEDURE — 85025 COMPLETE CBC W/AUTO DIFF WBC: CPT | Performed by: EMERGENCY MEDICINE

## 2024-07-20 PROCEDURE — 93005 ELECTROCARDIOGRAM TRACING: CPT

## 2024-07-20 PROCEDURE — 96365 THER/PROPH/DIAG IV INF INIT: CPT

## 2024-07-20 PROCEDURE — 99285 EMERGENCY DEPT VISIT HI MDM: CPT | Mod: 25

## 2024-07-20 PROCEDURE — 80048 BASIC METABOLIC PNL TOTAL CA: CPT | Performed by: EMERGENCY MEDICINE

## 2024-07-20 RX ORDER — BISACODYL 10 MG
10 SUPPOSITORY, RECTAL RECTAL DAILY PRN
Status: DISCONTINUED | OUTPATIENT
Start: 2024-07-20 | End: 2024-07-21

## 2024-07-20 RX ORDER — CEFTRIAXONE 1 G/1
1 INJECTION, POWDER, FOR SOLUTION INTRAMUSCULAR; INTRAVENOUS ONCE
Status: COMPLETED | OUTPATIENT
Start: 2024-07-20 | End: 2024-07-20

## 2024-07-20 RX ORDER — LIDOCAINE HYDROCHLORIDE 20 MG/ML
6 JELLY TOPICAL ONCE
Status: DISCONTINUED | OUTPATIENT
Start: 2024-07-20 | End: 2024-07-21

## 2024-07-20 RX ORDER — CLOPIDOGREL BISULFATE 75 MG/1
75 TABLET ORAL DAILY
Status: DISCONTINUED | OUTPATIENT
Start: 2024-07-20 | End: 2024-07-21

## 2024-07-20 RX ORDER — ASPIRIN 81 MG/1
81 TABLET ORAL DAILY
Status: DISCONTINUED | OUTPATIENT
Start: 2024-07-20 | End: 2024-07-21

## 2024-07-20 RX ORDER — POLYETHYLENE GLYCOL 3350 17 G/17G
17 POWDER, FOR SOLUTION ORAL 2 TIMES DAILY PRN
Status: DISCONTINUED | OUTPATIENT
Start: 2024-07-20 | End: 2024-07-21

## 2024-07-20 RX ORDER — ACETAMINOPHEN 325 MG/1
650 TABLET ORAL EVERY 4 HOURS PRN
Status: DISCONTINUED | OUTPATIENT
Start: 2024-07-20 | End: 2024-07-21

## 2024-07-20 RX ORDER — LIDOCAINE 40 MG/G
CREAM TOPICAL
Status: DISCONTINUED | OUTPATIENT
Start: 2024-07-20 | End: 2024-07-21

## 2024-07-20 RX ORDER — CEFTRIAXONE 1 G/1
1 INJECTION, POWDER, FOR SOLUTION INTRAMUSCULAR; INTRAVENOUS EVERY 24 HOURS
Status: DISCONTINUED | OUTPATIENT
Start: 2024-07-21 | End: 2024-07-21

## 2024-07-20 RX ORDER — LISINOPRIL 40 MG/1
40 TABLET ORAL DAILY
Status: DISCONTINUED | OUTPATIENT
Start: 2024-07-20 | End: 2024-07-21

## 2024-07-20 RX ORDER — ACETAMINOPHEN 650 MG/1
650 SUPPOSITORY RECTAL EVERY 4 HOURS PRN
Status: DISCONTINUED | OUTPATIENT
Start: 2024-07-20 | End: 2024-07-21

## 2024-07-20 RX ORDER — METOPROLOL SUCCINATE 50 MG/1
50 TABLET, EXTENDED RELEASE ORAL DAILY
Status: DISCONTINUED | OUTPATIENT
Start: 2024-07-20 | End: 2024-07-21

## 2024-07-20 RX ORDER — CALCIUM CARBONATE 500 MG/1
1000 TABLET, CHEWABLE ORAL 4 TIMES DAILY PRN
Status: DISCONTINUED | OUTPATIENT
Start: 2024-07-20 | End: 2024-07-21

## 2024-07-20 RX ORDER — ATORVASTATIN CALCIUM 40 MG/1
80 TABLET, FILM COATED ORAL DAILY
Status: DISCONTINUED | OUTPATIENT
Start: 2024-07-20 | End: 2024-07-21

## 2024-07-20 RX ORDER — ASPIRIN 81 MG/1
81 TABLET ORAL DAILY
Status: ON HOLD | COMMUNITY
End: 2024-07-23

## 2024-07-20 RX ADMIN — LISINOPRIL 40 MG: 10 TABLET ORAL at 12:39

## 2024-07-20 RX ADMIN — CEFTRIAXONE 1 G: 1 INJECTION, POWDER, FOR SOLUTION INTRAMUSCULAR; INTRAVENOUS at 09:39

## 2024-07-20 RX ADMIN — SODIUM CHLORIDE 1000 ML: 9 INJECTION, SOLUTION INTRAVENOUS at 14:55

## 2024-07-20 RX ADMIN — ATORVASTATIN CALCIUM 80 MG: 40 TABLET, FILM COATED ORAL at 12:39

## 2024-07-20 RX ADMIN — METOPROLOL SUCCINATE 50 MG: 50 TABLET, EXTENDED RELEASE ORAL at 12:39

## 2024-07-20 ASSESSMENT — ACTIVITIES OF DAILY LIVING (ADL)
HEARING_DIFFICULTY_OR_DEAF: NO
DIFFICULTY_COMMUNICATING: NO
DIFFICULTY_EATING/SWALLOWING: NO
ADLS_ACUITY_SCORE: 23
ADLS_ACUITY_SCORE: 35
WALKING_OR_CLIMBING_STAIRS_DIFFICULTY: NO
DRESSING/BATHING_DIFFICULTY: NO
TOILETING_ISSUES: NO
ADLS_ACUITY_SCORE: 23
ADLS_ACUITY_SCORE: 35
ADLS_ACUITY_SCORE: 20
ADLS_ACUITY_SCORE: 35
CONCENTRATING,_REMEMBERING_OR_MAKING_DECISIONS_DIFFICULTY: NO
WEAR_GLASSES_OR_BLIND: NO
FALL_HISTORY_WITHIN_LAST_SIX_MONTHS: NO
ADLS_ACUITY_SCORE: 35
ADLS_ACUITY_SCORE: 23
DOING_ERRANDS_INDEPENDENTLY_DIFFICULTY: YES
ADLS_ACUITY_SCORE: 35
ADLS_ACUITY_SCORE: 23
ADLS_ACUITY_SCORE: 35
ADLS_ACUITY_SCORE: 35
CHANGE_IN_FUNCTIONAL_STATUS_SINCE_ONSET_OF_CURRENT_ILLNESS/INJURY: NO

## 2024-07-20 ASSESSMENT — COLUMBIA-SUICIDE SEVERITY RATING SCALE - C-SSRS
2. HAVE YOU ACTUALLY HAD ANY THOUGHTS OF KILLING YOURSELF IN THE PAST MONTH?: NO
6. HAVE YOU EVER DONE ANYTHING, STARTED TO DO ANYTHING, OR PREPARED TO DO ANYTHING TO END YOUR LIFE?: NO
1. IN THE PAST MONTH, HAVE YOU WISHED YOU WERE DEAD OR WISHED YOU COULD GO TO SLEEP AND NOT WAKE UP?: NO

## 2024-07-20 NOTE — CONSULTS
[facility] Urology Consultation    Issac Jackson MRN# [id number]   Age: 68 year old YOB: 1955     Date of Admission:  7/20/2024    Date of Consult:   07/20/2024           Assessment and Plan:   Assessment:   Issac Jackson is a 68-year-old male with a medical history of STEMI, coronary artery disease status post stenting to the RCA and LAD in 2016 (on Plavix and Aspirin), ischemic cardiomyopathy, congestive heart failure, hypertension, hyperlipidemia, and a history of hernia repair, presented to the [facility] emergency department for evaluation of hematuria with clots.     His 3-way catheter placed by the ER was irrigated with return of at least 50 mL of clot. I placed a total of 40 mL in the balloon and placed it on traction. I discussed with him that although it is possible that this resolve conservatively my sense would be that he needs to go to the operating room for cystoscopy with clot evacuation which he was amenable to.     I walked into his room later to discuss operative plans with him and his wife when I found him diaphoretic, unable to answer simple questions, and briefly went unresponsive at which point the rapid response team arrived.         Plan:   - NPO at midnight  - Continue CBI with traction   - Recommend a bladder antispasmodic for comfort such as Detrol 4 mg ER qDay   - Awaiting outcome of rapid response, pending medical stability will plan on cystoscopy with clot evacuation and fulguration of any bleeding vessels on the AM of 7/21/2024    Discussed with staff, Dr. Adame            Chief Complaint:   Hematuria          History of Present Illness:   Issac Jackson is a 68-year-old male with a medical history of STEMI, coronary artery disease status post stenting to the RCA and LAD in 2016 (on Plavix and Aspirin), ischemic cardiomyopathy, congestive heart failure, hypertension, hyperlipidemia, and a history of hernia repair, presented to the [facility]  emergency department for evaluation of hematuria with clots.     He said he did have this happen to him decades ago and underwent a cystoscopy at that time. Otherwise has minimal cardiac history. He has had cardiac stents placed in the remote past and says he's been off of plavix for dental procedures for 5 days+ in the past.             Past Medical History:     Past Medical History:   Diagnosis Date    Hypertension              Past Surgical History:     Past Surgical History:   Procedure Laterality Date    HERNIA REPAIR               Social History:     Social History     Tobacco Use    Smoking status: Not on file    Smokeless tobacco: Not on file   Substance Use Topics    Alcohol use: Not on file             Family History:   No family history on file.             Allergies:   No Known Allergies          Medications:     Current Facility-Administered Medications   Medication Dose Route Frequency Provider Last Rate Last Admin    acetaminophen (TYLENOL) tablet 650 mg  650 mg Oral Q4H PRN Damaso Thakur APRN CNP        Or    acetaminophen (TYLENOL) Suppository 650 mg  650 mg Rectal Q4H PRN Damaso Thakur APRN CNP        [Held by provider] aspirin EC tablet 81 mg  81 mg Oral Daily Damaso Thakur APRN CNP        atorvastatin (LIPITOR) tablet 80 mg  80 mg Oral Daily Damaso Thakur APRN CNP   80 mg at 07/20/24 1239    bisacodyl (DULCOLAX) suppository 10 mg  10 mg Rectal Daily PRN Damaso Thakur APRN CNP        calcium carbonate (TUMS) chewable tablet 1,000 mg  1,000 mg Oral 4x Daily PRN Damaso Thakur APRN CNP        [START ON 7/21/2024] cefTRIAXone (ROCEPHIN) 1 g vial to attach to  mL bag for ADULTS or NS 50 mL bag for PEDS  1 g Intravenous Q24H Damaso Thakur APRN CNP        [Held by provider] clopidogrel (PLAVIX) tablet 75 mg  75 mg Oral Daily Damaso Thakur APRN CNP        lidocaine (LMX4) cream   Topical Q1H PRN  DarioDamaso Banda APRN CNP        lidocaine (XYLOCAINE) 2 % external gel 6 mL  6 mL Urethral Once DarioDamaso Banda APRN CNP        lidocaine 1 % 0.1-1 mL  0.1-1 mL Other Q1H PRN Damaso Thakur APRN CNP        lisinopril (ZESTRIL) tablet 40 mg  40 mg Oral Daily DarioDamaso Banda APRN CNP   40 mg at 07/20/24 1239    metoprolol succinate ER (TOPROL XL) 24 hr tablet 50 mg  50 mg Oral Daily DarioDamaso Banda APRN CNP   50 mg at 07/20/24 1239    polyethylene glycol (MIRALAX) Packet 17 g  17 g Oral BID PRN Damaso Thakur APRN CNP        sodium chloride (PF) 0.9% PF flush 3 mL  3 mL Intracatheter Q8H Damaso Thakur APRN CNP   3 mL at 07/20/24 1242    sodium chloride (PF) 0.9% PF flush 3 mL  3 mL Intracatheter q1 min prn DarioDamaso Banda APRN CNP         Current Outpatient Medications   Medication Sig Dispense Refill    aspirin 81 MG EC tablet Take 81 mg by mouth daily      atorvastatin (LIPITOR) 80 MG tablet Take 80 mg by mouth daily      clopidogrel (PLAVIX) 75 MG tablet Take 75 mg by mouth      lisinopril (ZESTRIL) 20 MG tablet Take 40 mg by mouth daily      metoprolol succinate ER (TOPROL XL) 50 MG 24 hr tablet Take 50 mg by mouth daily                 Review of Systems:   Positive findings in BOLD, otherwise negative   Constitutional: chills, fatigue, fever, weight loss  Respiratory: cough and shortness of breath  Cardiovascular: chest pain, palpitations, racing heart beat   Gastrointestinal: abdominal pain, constipation, diarrhea, nausea, vomiting  Genitourinary: Per HPI   Skin: rash  Neuro: weakness, confusion, focal deficits   All other systems reviewed and are negative          Physical Exam:   All vitals have been reviewed  Temp:  [97.6  F (36.4  C)] 97.6  F (36.4  C)  Pulse:  [54-88] 67  Resp:  [20] 20  BP: ()/() 143/83  SpO2:  [95 %-99 %] 98 %    Intake/Output Summary (Last 24 hours) at 7/20/2024 7401  Last data filed  at 7/20/2024 0817  Gross per 24 hour   Intake --   Output 1100 ml   Net -1100 ml     Physical Exam:  General:Well appearing when first visited   HEENT: Normocephalic and atraumatic.   CV: Regular rate, non-cyanotic in appearance   Pulm: Normal respiratory effort on room air  : Jansen catheter with Grade III-IV active hematuria  Extremities: Warm and well perfused  Neuro: CNII-XII grossly intact          Data:   All laboratory data reviewed    Results:  BMP  Recent Labs   Lab 07/20/24  1439 07/20/24  1432 07/20/24  0614     --  140   POTASSIUM 4.3  --  3.8   CHLORIDE 106  --  105   CO2  --   --  23   BUN 20  --  23.3*   CR 1.4*  --  1.51*   * 119* 122*     CBC  Recent Labs   Lab 07/20/24  1439 07/20/24  0614   WBC  --  8.0   HGB 12.9* 13.4   PLT  --  309     LFTNo lab results found in last 7 days.  Recent Labs   Lab 07/20/24  1439 07/20/24  1432 07/20/24  0614   * 119* 122*       Imaging:  CT July 20, 2024:  Large blood clot within the bladder, no hydro, no stones     Claudy Dotson MD   Urology Resident PGY-5

## 2024-07-20 NOTE — ED TRIAGE NOTES
Pt present with wife to triage for hematuria. Pt stated he woke up 5am, had quite significant blood in the urine, bright-dark red. On blood thinner for heart stent. Otherwise no dysuria, no straining with urination. ABC intact      Triage Assessment (Adult)       Row Name 07/20/24 0534          Triage Assessment    Airway WDL WDL        Respiratory WDL    Respiratory WDL WDL        Skin Circulation/Temperature WDL    Skin Circulation/Temperature WDL WDL        Cardiac WDL    Cardiac WDL WDL        Peripheral/Neurovascular WDL    Peripheral Neurovascular WDL WDL        Cognitive/Neuro/Behavioral WDL    Cognitive/Neuro/Behavioral WDL WDL                     
Verbalized Understanding/Simple: Patient demonstrates quick and easy understanding

## 2024-07-20 NOTE — ED PROVIDER NOTES
Emergency Department Note      History of Present Illness     Chief Complaint   Hematuria      HPI   Issac Jackson is a 68 year old male on Plavix and Lipitor with a history of hypertension who presents to the ED with his wife for evaluation of hematuria. The patient reports around 0500, upon waking, he had gross hematuria with noticeable clots. Adds that he has no prior history of hematuria. Endorses low back pain for about a week. Denies fullness of the bladder. No abdominal or flank pain.     Independent Historian   None    Review of External Notes   None    Past Medical History     Medical History and Problem List   HTN  Dyslipidemia  Ischemic cardiomyopathy  Stented Coronary Artery    Medications   Lipitor   Plavix  Hydrochlorothiazide PO  Toprol XL  Nitrostat    Surgical History   Hernia Repair    Physical Exam     Patient Vitals for the past 24 hrs:   BP Temp Temp src Pulse Resp SpO2 Weight   07/20/24 0528 (!) 181/105 97.6  F (36.4  C) Oral 88 20 97 % 87.3 kg (192 lb 7.4 oz)     Physical Exam  Vitals reviewed.   HENT:      Head: Normocephalic.   Cardiovascular:      Rate and Rhythm: Normal rate.      Pulses: Normal pulses.   Pulmonary:      Effort: Pulmonary effort is normal.   Abdominal:      General: Abdomen is flat.   Genitourinary:     Comments: Jansen catheter draining grossly bloody urine.  Skin:     Capillary Refill: Capillary refill takes less than 2 seconds.   Neurological:      General: No focal deficit present.      Mental Status: He is alert and oriented to person, place, and time.   Psychiatric:         Mood and Affect: Mood normal.           Diagnostics     Lab Results   Labs Ordered and Resulted from Time of ED Arrival to Time of ED Departure   ROUTINE UA WITH MICROSCOPIC REFLEX TO CULTURE - Abnormal       Result Value    Color Urine Red (*)     Appearance Urine Cloudy (*)     Glucose Urine 70 (*)     Bilirubin Urine Negative      Ketones Urine Negative      Specific Gravity Urine 1.031       Blood Urine Large (*)     pH Urine 7.5 (*)     Protein Albumin Urine 600 (*)     Urobilinogen Urine Normal      Nitrite Urine Negative      Leukocyte Esterase Urine Trace (*)     WBC Clumps Urine Present (*)     RBC Urine >182 (*)     WBC Urine >182 (*)    BASIC METABOLIC PANEL - Abnormal    Sodium 140      Potassium 3.8      Chloride 105      Carbon Dioxide (CO2) 23      Anion Gap 12      Urea Nitrogen 23.3 (*)     Creatinine 1.51 (*)     GFR Estimate 50 (*)     Calcium 9.1      Glucose 122 (*)    GLUCOSE BY METER - Abnormal    GLUCOSE BY METER POCT 119 (*)    ISTAT BASIC CHEM ICA HEMATOCRIT POCT - Abnormal    Chloride POCT 106      Potassium POCT 4.3      Sodium POCT 140      UREA NITROGEN POCT 20      Calcium, Ionized Whole Blood POCT 4.9      Glucose Whole Blood POCT 125 (*)     Anion Gap POCT 15.0 (*)     Hemoglobin POCT 12.9 (*)     Hematocrit POCT 38 (*)     Creatinine POCT 1.4 (*)     TOTAL CO2 POCT 25     PROCALCITONIN - Normal    Procalcitonin 0.07     CBC WITH PLATELETS AND DIFFERENTIAL    WBC Count 8.0      RBC Count 4.40      Hemoglobin 13.4      Hematocrit 41.4      MCV 94      MCH 30.5      MCHC 32.4      RDW 12.6      Platelet Count 309      % Neutrophils 53      % Lymphocytes 28      % Monocytes 11      % Eosinophils 8      % Basophils 1      % Immature Granulocytes 1      NRBCs per 100 WBC 0      Absolute Neutrophils 4.2      Absolute Lymphocytes 2.2      Absolute Monocytes 0.8      Absolute Eosinophils 0.6      Absolute Basophils 0.1      Absolute Immature Granulocytes 0.0      Absolute NRBCs 0.0         Imaging   Abd/pelvis CT no contrast - Stone Protocol   Final Result   IMPRESSION:    1.  Dependent clot material within the urinary bladder. Prostatomegaly. No urinary tract calculi, hydronephrosis or hydroureter on either side.      2.  No mechanical bowel obstruction or free gas. Distal colonic diverticulosis without acute inflammation or secondary complications. Normal appendix. Small hiatal  hernia. Tiny fat-containing ventral umbilical hernia containing a focal mineralization,    unchanged.      3.  Atherosclerotic and mildly aneurysmal distal abdominal aorta. Normal cardiac size. Coronary artery calcifications. Evidence of remote granulomatous disease.              Independent Interpretation   None    ED Course      Medications Administered   Medications - No data to display    Discussion of Management   None    ED Course   ED Course as of 08/11/24 0849   Sat Jul 20, 2024   0602 I obtained history and examined the patient as noted above.    0718 I rechecked and updated the patient.    0755 I rechecked the patient.    0859 I rechecked and updated the patient.        Optional/Additional Documentation  None    Medical Decision Making / Diagnosis     CMS Diagnoses: None    MIPS         [facility]   Issac Jackson is a 68 year old male who presents with ongoing grossly bloody urine.  Patient presents on anticoagulation.  UA suggests infection.  CT negative for stones or obstruction unable to clear grossly red urine in the emergency room with either hand or CBI irrigation.  Recommendations are for continuous bladder irrigation and urology consultation.  Care was discussed with the hospitalist and was admitted on an observation status if will moved to inpatient if procedures are planned.    Disposition   The patient was admitted to the hospital.     Diagnosis     ICD-10-CM    1. Gross hematuria  R31.0 Case Request: CYSTOSCOPY, WITH FULGURATION OF HEMORRHAGING BLOOD VESSEL AND THROMBUS REMOVAL     Case Request: CYSTOSCOPY, WITH FULGURATION OF HEMORRHAGING BLOOD VESSEL AND THROMBUS REMOVAL           Discharge Medications   New Prescriptions    No medications on file         Scribe Disclosure:  Rosalia TILLEY, am serving as a scribe at 6:19 AM on 7/20/2024 to document services personally performed by Ben Stone MD based on my observations and the provider's statements to me.        Ben Stone  MD Brad  08/11/24 0851     [facility]  INTERNAL MEDICINE  [address] [place] [address]  Phone: [phone]  Fax: [phone]  Follow Up Time: 1-3 Days Lenny Ramirez  INTERNAL MEDICINE  [address] [place] [id number]  Phone: [phone]  Fax: [phone]  Follow Up Time: 1-3 Days    [facility]  VASCULAR SURGERY  [address], [address]  [place] [address]  Phone: [phone]  Fax: [phone]  Follow Up Time: Routine

## 2024-07-20 NOTE — ED NOTES
[facility]  ED Nurse Handoff Report    ED Chief complaint: Hematuria  . ED Diagnosis:   Final diagnoses:   Gross hematuria       Allergies: No Known Allergies    Code Status: Full Code    Activity level - Baseline/Home:  independent.  Activity Level - Current:   standby.   Lift room needed: No.   Bariatric: No   Needed: No   Isolation: No.   Infection: Not Applicable.     Respiratory status: Room air    Vital Signs (within 30 minutes):   Vitals:    07/20/24 0528   BP: (!) 181/105   Pulse: 88   Resp: 20   Temp: 97.6  F (36.4  C)   TempSrc: Oral   SpO2: 97%   Weight: 87.3 kg (192 lb 7.4 oz)       Cardiac Rhythm:  ,      Pain level:    Patient confused: No.   Patient Falls Risk: patient and family education.   Elimination Status: Urethral catheter (baig) in place; refer to orders to discontinue as per protocol      Patient Report - Initial Complaint: hematuria.   Focused Assessment: Pt present with wife to triage for hematuria. Pt stated he woke up 5am, had quite significant blood in the urine, bright-dark red. On blood thinner for heart stent. Otherwise no dysuria, no straining with urination. ABC intact     Abnormal Results:   Labs Ordered and Resulted from Time of ED Arrival to Time of ED Departure   ROUTINE UA WITH MICROSCOPIC REFLEX TO CULTURE - Abnormal       Result Value    Color Urine Red (*)     Appearance Urine Cloudy (*)     Glucose Urine 70 (*)     Bilirubin Urine Negative      Ketones Urine Negative      Specific Gravity Urine 1.031      Blood Urine Large (*)     pH Urine 7.5 (*)     Protein Albumin Urine 600 (*)     Urobilinogen Urine Normal      Nitrite Urine Negative      Leukocyte Esterase Urine Trace (*)     WBC Clumps Urine Present (*)     RBC Urine >182 (*)     WBC Urine >182 (*)    BASIC METABOLIC PANEL - Abnormal    Sodium 140      Potassium 3.8      Chloride 105      Carbon Dioxide (CO2) 23      Anion Gap 12      Urea Nitrogen 23.3 (*)     Creatinine 1.51 (*)      GFR Estimate 50 (*)     Calcium 9.1      Glucose 122 (*)    CBC WITH PLATELETS AND DIFFERENTIAL    WBC Count 8.0      RBC Count 4.40      Hemoglobin 13.4      Hematocrit 41.4      MCV 94      MCH 30.5      MCHC 32.4      RDW 12.6      Platelet Count 309      % Neutrophils 53      % Lymphocytes 28      % Monocytes 11      % Eosinophils 8      % Basophils 1      % Immature Granulocytes 1      NRBCs per 100 WBC 0      Absolute Neutrophils 4.2      Absolute Lymphocytes 2.2      Absolute Monocytes 0.8      Absolute Eosinophils 0.6      Absolute Basophils 0.1      Absolute Immature Granulocytes 0.0      Absolute NRBCs 0.0     URINE CULTURE        Abd/pelvis CT no contrast - Stone Protocol   Final Result   IMPRESSION:    1.  Dependent clot material within the urinary bladder. Prostatomegaly. No urinary tract calculi, hydronephrosis or hydroureter on either side.      2.  No mechanical bowel obstruction or free gas. Distal colonic diverticulosis without acute inflammation or secondary complications. Normal appendix. Small hiatal hernia. Tiny fat-containing ventral umbilical hernia containing a focal mineralization,    unchanged.      3.  Atherosclerotic and mildly aneurysmal distal abdominal aorta. Normal cardiac size. Coronary artery calcifications. Evidence of remote granulomatous disease.                Treatments provided: See MAR  Family Comments: family at bedside  OBS brochure/video discussed/provided to patient:  Yes  ED Medications:   Medications   lidocaine (XYLOCAINE) 2 % external gel 6 mL (has no administration in time range)   cefTRIAXone (ROCEPHIN) 1 g vial to attach to  mL bag for ADULTS or NS 50 mL bag for PEDS (has no administration in time range)   cefTRIAXone (ROCEPHIN) 1 g vial to attach to  mL bag for ADULTS or NS 50 mL bag for PEDS (1 g Intravenous $New Bag 7/20/24 1076)       Drips infusing:  No  For the majority of the shift this patient was Green.   Interventions performed were  N/A.    Sepsis treatment initiated: No    Cares/treatment/interventions/medications to be completed following ED care: N/A    ED Nurse Name: Jesenia Chavez RN  10:16 AM    RECEIVING UNIT ED HANDOFF REVIEW    Above ED Nurse Handoff Report was reviewed: Yes  Reviewed by: Mary Monge RN on July 20, 2024 at 4:10 PM   I Jad called the ED to inform them the note was read: Yes

## 2024-07-20 NOTE — PROGRESS NOTES
[facility]    ED Boarding Nurse Handoff Addendum Report:    Date/time: 7/20/2024, 3:14 PM    Activity Level: {activity :772983}    Fall Risk: {Yes/No :670159}    Active Infusions: ***    Current Meds Due: ***    Current care needs: ***    Oxygen requirements (liters/min and/or FiO2): ***    Respiratory status: {Room air or supplements :295814}    Vital signs (within last 30 minutes):    Vitals:    07/20/24 1435 07/20/24 1440 07/20/24 1450 07/20/24 1505   BP: 122/63 122/67 (!) 159/100 (!) 143/83   Pulse: 60 68 68 67   Resp:       Temp:       TempSrc:       SpO2: 97% 99% 98%    Weight:           Focused assessment within last 30 minutes:    ***Patient has 3 way cath with CBI, a total of *** bags have been irrigated. At 1427 patient became very diaphoretic, pale, eyes rolled back and patient became unresponsive for about 5 seconds. Rapid response was initiated. BP 78/61. 1L NS bolus given with pressure bag. BG was 125. EKG completed at bedside. BP improved after bolus.     ED Boarding Nurse name: Jaquelin Cisneros RN

## 2024-07-20 NOTE — ED NOTES
Continuous bladder irrigation stopped per MD verbal request. Writer noted clots in catheter tube shortly after stopping. MD updated.

## 2024-07-20 NOTE — ED NOTES
MD at bedside to assess catheter drainage. Clots and bright red blood noted in catheter and bag. Bladder irrigation started back up per verbal order from MD.

## 2024-07-20 NOTE — ED NOTES
Spoke with MD, requesting 3 way bladder irrigation for clot removal. States plan is to remove catheter after irrigation and complete void trial. Pt educated on plan and states understanding.

## 2024-07-20 NOTE — ED PROVIDER NOTES
Rapid response called.    Went over to room 27 patient is sitting up diaphoretic, pale appearing, blood pressure in the 70s, bradycardic.    He was laid flat.  Symptoms began to improve as did his perfusion.  EKG showed a sinus bradycardia without evidence of ischemia.  Blood sugar was unremarkable.  Bedside ultrasound showed normal left ventricular systolic function, no intra-abdominal free fluid.  Abdominal aorta is 3 cm this is the same as was measured on his CT scan from earlier today.    Hospitalist will be notified by the responding nurses.  No emergent pathology found.    I-STAT Chem-8 showed no significant drop in his hemoglobin.      MD Payam Reyes, Shyam Bergman MD  07/20/24 5974

## 2024-07-20 NOTE — PHARMACY-ADMISSION MEDICATION HISTORY
Pharmacy Intern Admission Medication History    Admission medication history is complete. The information provided in this note is only as accurate as the sources available at the time of the update.    Information Source(s): Patient via in-person    Pertinent Information: NA    Changes made to PTA medication list:  Added: Aspirin   Deleted: hydrochlorothiazide   Changed:   Lisinopril 20 mg daily > 40 mg daily    Allergies reviewed with patient and updates made in EHR: yes    Medication History Completed By: Belem Rebollar 7/20/2024 10:21 AM    PTA Med List   Medication Sig Last Dose    aspirin 81 MG EC tablet Take 81 mg by mouth daily 7/19/2024 at AM    atorvastatin (LIPITOR) 80 MG tablet Take 80 mg by mouth daily 7/19/2024 at AM    clopidogrel (PLAVIX) 75 MG tablet Take 75 mg by mouth 7/19/2024 at AM    lisinopril (ZESTRIL) 20 MG tablet Take 40 mg by mouth daily 7/19/2024 at AM    metoprolol succinate ER (TOPROL XL) 50 MG 24 hr tablet Take 50 mg by mouth daily 7/19/2024 at AM

## 2024-07-20 NOTE — H&P
[facility]    History and Physical - Hospitalist Service       Date of Admission:  7/20/2024    Assessment & Plan   Issac Jackson is a 68-year-old male with a medical history of STEMI, coronary artery disease status post stenting to the RCA and LAD in 2016 (on Plavix and Aspirin), ischemic cardiomyopathy, congestive heart failure, hypertension, hyperlipidemia, and a history of hernia repair who presented to the [facility] emergency department for evaluation of hematuria with clots and lower back pain x1 week,     #. Hematuria   #. Urinary tract infection  The patient presented with painless hematuria with clots, first noted at 5:00 AM on 07/20/2024. He endorses lower back pain x1 week, but no pain with urination. Vital signs are stable, and there is no leukocytosis (WBC 8). UA showed trace leukocyte esterase and WBC, but was negative for nitrite and bacteria. In the ED, IV Ceftriaxone was initiated. Abdomen/pelvis CT without contrast revealed dependent clot material within the urinary bladder and mild prostatomegaly, with no urinary tract calculi, hydronephrosis, or hydroureter. There was no evidence of mechanical bowel obstruction, free gas, or significant lymphadenopathy. Additional findings included distal colonic diverticulosis without acute inflammation, a small hiatal hernia, a tiny fat-containing ventral umbilical hernia, and an atherosclerotic and mildly aneurysmal distal abdominal aorta. The etiology of the hematuria remains unclear; following a discussion between ED provider and urology, continuous bladder irrigation has been recommended. The patient is being admitted to the medicine team for further evaluation and management of the hematuria.  -Monitor CBC  -Monitor urinary output  -Continue IV Ceftriaxone  -Procalcitonin added to confirm for bacterial infection  -Follow up urine culture   -Consult formal urology    #. CHF   #. CAD s/p stenting to the RCA and LAD in 2016  #.  Hypertension   #. Hyperlipidemia  The most recent echocardiogram from 2016 indicated a normal left ventricular (LV) size with mildly reduced LV systolic function and an ejection fraction (EF) of 45%. It also revealed mild concentric left ventricular hypertrophy (LVH). No additional echocardiograms were found during the chart review.  -Hold Plavix and Aspirin for hematuria, may have to consult with cardiology for prolonged hematuria   -Continue PTA Lisinopril, Metoprolol, and Atorvastatin    Diet: Combination Diet Regular Diet Adult; Low Saturated Fat Na <2400mg Diet    DVT Prophylaxis: Pneumatic Compression Devices  Jansen Catheter: PRESENT, indication:    Lines: None     Cardiac Monitoring: None  Code Status: Full Code      Clinically Significant Risk Factors Present on Admission                # Drug Induced Platelet Defect: home medication list includes an antiplatelet medication   # Hypertension: Home medication list includes antihypertensive(s)      Disposition Plan    Medically Ready for Discharge: Anticipated in 2-4 Days  The patient's care was discussed with the Attending Physician, Dr. Bernardo .    RY Carrizales [facility]  Hospitalist Service  [facility]  Securely message with [employer] (more info)  Text page via [employer] Paging/Directory     ______________________________________________________________________    Chief Complaint   Hematuria with blood clots     History is obtained from the patient    History of Present Illness   Issac KATE Jackson is a 68-year-old male with a medical history of STEMI, coronary artery disease status post stenting to the RCA and LAD in 2016 (on Plavix and Aspirin), ischemic cardiomyopathy, congestive heart failure, hypertension, hyperlipidemia, and a history of hernia repair, presented to the [facility] emergency department for evaluation of hematuria with clots, which he first noticed at 5:00 AM on 07/20/2024.    The patient reports experiencing  gross hematuria with noticeable clots around 5:00 AM upon waking, with no prior history of hematuria. He also endorses low back pain for approximately one week but denies any sensation of bladder fullness, as well as any abdominal or flank pain.    Past Medical History    Past Medical History:   Diagnosis Date    Hypertension      Past Surgical History   Past Surgical History:   Procedure Laterality Date    HERNIA REPAIR       Prior to Admission Medications   Prior to Admission Medications   Prescriptions Last Dose Informant Patient Reported? Taking?   aspirin 81 MG EC tablet 7/19/2024 at AM  Yes Yes   Sig: Take 81 mg by mouth daily   atorvastatin (LIPITOR) 80 MG tablet 7/19/2024 at AM  Yes Yes   Sig: Take 80 mg by mouth daily   clopidogrel (PLAVIX) 75 MG tablet 7/19/2024 at AM  Yes Yes   Sig: Take 75 mg by mouth   lisinopril (ZESTRIL) 20 MG tablet 7/19/2024 at AM  Yes Yes   Sig: Take 40 mg by mouth daily   metoprolol succinate ER (TOPROL XL) 50 MG 24 hr tablet 7/19/2024 at AM  Yes Yes   Sig: Take 50 mg by mouth daily      Facility-Administered Medications: None      Review of Systems    The 10 point Review of Systems is negative other than noted in the HPI.     Physical Exam   Vital Signs: Temp: 97.6  F (36.4  C) Temp src: Oral BP: (!) 181/105 Pulse: 88   Resp: 20 SpO2: 97 % O2 Device: None (Room air)    Weight: 192 lbs 7.39 oz    Constitutional: cooperative, pleasant, not dyspneic/diaphoretic, no acute distress  Eyes: Sclera anicteric/injected  Ears/nose/mouth/throat: Normal oropharynx without ulcers or exudate, mucus membranes moist, hearing intact  Neck: supple, thyroid normal size  CV: RRR. No edema  Respiratory: CTA bilaterally . Unlabored breathing  Lymph: No axillary, submandibular, supraclavicular or inguinal lymphadenopathy  Abd: Non-distended, +bs, no hepatosplenomegaly, nontender, no peritoneal signs. NO CVA tenderness  Skin: warm, perfused, no jaundice  Neuro: AAO x 3, No asterixis  Psych: Normal  affect  MSK: No gross deformities     Medical Decision Making       60 MINUTES SPENT BY ME on the date of service doing chart review, history, exam, documentation & further activities per the note.      Data   ------------------------- PAST 24 HR DATA REVIEWED -----------------------------------------------    I have personally reviewed the following data over the past 24 hrs:    8.0  \   13.4   / 309     140 105 23.3 (H) /  122 (H)   3.8 23 1.51 (H) \       Imaging results reviewed over the past 24 hrs:   Recent Results (from the past 24 hour(s))   Abd/pelvis CT no contrast - Stone Protocol    Narrative    EXAM: CT ABDOMEN AND PELVIS WITHOUT CONTRAST  LOCATION: [facility]  DATE: 07/20/2024    INDICATION: Gross hematuria.  COMPARISON: CT aortogram (CTA chest, abdomen and pelvis) with contrast 11/09/2023.  TECHNIQUE: CT scan of the abdomen and pelvis was performed without IV contrast. Multiplanar reformats were obtained. Dose reduction techniques were used.  CONTRAST: None.    FINDINGS:   LOWER CHEST: Interstitial nodular changes in the lower lungs, more on the right. Emphysematous changes. No pleural effusion on either side. Normal cardiac size. Coronary artery calcifications. No pericardial effusion. No significant change.    HEPATOBILIARY: No discrete lesion, calcified gallstones or biliary dilatation.    PANCREAS: Normal.    SPLEEN: Calcified granulomas in the spleen.    ADRENAL GLANDS: Normal.    KIDNEYS/BLADDER: No urinary tract calculi. Both kidneys are negative for hydronephrosis and hydroureter. Dependent clot material within the urinary bladder. Mild prostatomegaly.    BOWEL: Formed stool material and scattered gas within normal caliber colon without mechanical obstruction or free gas. Distal colonic diverticulosis, more apparent at the rectosigmoid, without acute inflammation or secondary complications. Normal   appendix. Small hiatal hernia.    LYMPH NODES: No suspicious  abdominopelvic adenopathy.    VASCULATURE: Atherosclerotic and mildly aneurysmal distal abdominal aorta measuring 3.0 x 3.1 cm (image 103, series 3). Normal caliber IVC.    PELVIC ORGANS: Atherosclerotic changes. No adenopathy or free fluid. Normal appendix.    MUSCULOSKELETAL: Tiny fat-containing ventral umbilical hernia with a focal mineralization, unchanged. Degenerative changes involving the spine and joints of the pelvis.      Impression    IMPRESSION:   1.  Dependent clot material within the urinary bladder. Prostatomegaly. No urinary tract calculi, hydronephrosis or hydroureter on either side.    2.  No mechanical bowel obstruction or free gas. Distal colonic diverticulosis without acute inflammation or secondary complications. Normal appendix. Small hiatal hernia. Tiny fat-containing ventral umbilical hernia containing a focal mineralization,   unchanged.    3.  Atherosclerotic and mildly aneurysmal distal abdominal aorta. Normal cardiac size. Coronary artery calcifications. Evidence of remote granulomatous disease.

## 2024-07-21 ENCOUNTER — ANESTHESIA (OUTPATIENT)
Dept: SURGERY | Facility: CLINIC | Age: 69
DRG: 664 | End: 2024-07-21
Payer: COMMERCIAL

## 2024-07-21 ENCOUNTER — ANESTHESIA EVENT (OUTPATIENT)
Dept: SURGERY | Facility: CLINIC | Age: 69
DRG: 664 | End: 2024-07-21
Payer: COMMERCIAL

## 2024-07-21 LAB
ALBUMIN SERPL BCG-MCNC: 3.8 G/DL (ref 3.5–5.2)
ALP SERPL-CCNC: 103 U/L (ref 40–150)
ALT SERPL W P-5'-P-CCNC: 20 U/L (ref 0–70)
ANION GAP SERPL CALCULATED.3IONS-SCNC: 10 MMOL/L (ref 7–15)
AST SERPL W P-5'-P-CCNC: 20 U/L (ref 0–45)
BACTERIA UR CULT: NO GROWTH
BILIRUB SERPL-MCNC: 0.8 MG/DL
BUN SERPL-MCNC: 15.6 MG/DL (ref 8–23)
CALCIUM SERPL-MCNC: 9.1 MG/DL (ref 8.8–10.4)
CHLORIDE SERPL-SCNC: 110 MMOL/L (ref 98–107)
CREAT SERPL-MCNC: 1.26 MG/DL (ref 0.67–1.17)
EGFRCR SERPLBLD CKD-EPI 2021: 62 ML/MIN/1.73M2
ERYTHROCYTE [DISTWIDTH] IN BLOOD BY AUTOMATED COUNT: 13.3 % (ref 10–15)
GLUCOSE SERPL-MCNC: 109 MG/DL (ref 70–99)
HCO3 SERPL-SCNC: 22 MMOL/L (ref 22–29)
HCT VFR BLD AUTO: 37.9 % (ref 40–53)
HGB BLD-MCNC: 12.5 G/DL (ref 13.3–17.7)
MCH RBC QN AUTO: 31 PG (ref 26.5–33)
MCHC RBC AUTO-ENTMCNC: 33 G/DL (ref 31.5–36.5)
MCV RBC AUTO: 94 FL (ref 78–100)
PLATELET # BLD AUTO: 282 10E3/UL (ref 150–450)
POTASSIUM SERPL-SCNC: 4.3 MMOL/L (ref 3.4–5.3)
PROT SERPL-MCNC: 6.2 G/DL (ref 6.4–8.3)
RBC # BLD AUTO: 4.03 10E6/UL (ref 4.4–5.9)
SODIUM SERPL-SCNC: 142 MMOL/L (ref 135–145)
WBC # BLD AUTO: 10.2 10E3/UL (ref 4–11)

## 2024-07-21 PROCEDURE — 999N000141 HC STATISTIC PRE-PROCEDURE NURSING ASSESSMENT: Performed by: UROLOGY

## 2024-07-21 PROCEDURE — 0W3R8ZZ CONTROL BLEEDING IN GENITOURINARY TRACT, VIA NATURAL OR ARTIFICIAL OPENING ENDOSCOPIC: ICD-10-PCS | Performed by: UROLOGY

## 2024-07-21 PROCEDURE — 250N000009 HC RX 250: Performed by: NURSE ANESTHETIST, CERTIFIED REGISTERED

## 2024-07-21 PROCEDURE — 370N000017 HC ANESTHESIA TECHNICAL FEE, PER MIN: Performed by: UROLOGY

## 2024-07-21 PROCEDURE — 99233 SBSQ HOSP IP/OBS HIGH 50: CPT | Performed by: STUDENT IN AN ORGANIZED HEALTH CARE EDUCATION/TRAINING PROGRAM

## 2024-07-21 PROCEDURE — 250N000011 HC RX IP 250 OP 636: Performed by: STUDENT IN AN ORGANIZED HEALTH CARE EDUCATION/TRAINING PROGRAM

## 2024-07-21 PROCEDURE — 36415 COLL VENOUS BLD VENIPUNCTURE: CPT | Performed by: NURSE PRACTITIONER

## 2024-07-21 PROCEDURE — C1758 CATHETER, URETERAL: HCPCS | Performed by: UROLOGY

## 2024-07-21 PROCEDURE — 250N000013 HC RX MED GY IP 250 OP 250 PS 637: Performed by: NURSE PRACTITIONER

## 2024-07-21 PROCEDURE — 82040 ASSAY OF SERUM ALBUMIN: CPT | Performed by: NURSE PRACTITIONER

## 2024-07-21 PROCEDURE — 120N000001 HC R&B MED SURG/OB

## 2024-07-21 PROCEDURE — 258N000003 HC RX IP 258 OP 636: Performed by: ANESTHESIOLOGY

## 2024-07-21 PROCEDURE — 250N000011 HC RX IP 250 OP 636: Performed by: NURSE ANESTHETIST, CERTIFIED REGISTERED

## 2024-07-21 PROCEDURE — 258N000001 HC RX 258

## 2024-07-21 PROCEDURE — 710N000009 HC RECOVERY PHASE 1, LEVEL 1, PER MIN: Performed by: UROLOGY

## 2024-07-21 PROCEDURE — 85027 COMPLETE CBC AUTOMATED: CPT | Performed by: NURSE PRACTITIONER

## 2024-07-21 PROCEDURE — 250N000013 HC RX MED GY IP 250 OP 250 PS 637: Performed by: STUDENT IN AN ORGANIZED HEALTH CARE EDUCATION/TRAINING PROGRAM

## 2024-07-21 PROCEDURE — 52214 CYSTOSCOPY AND TREATMENT: CPT | Mod: GC | Performed by: UROLOGY

## 2024-07-21 PROCEDURE — 250N000025 HC SEVOFLURANE, PER MIN: Performed by: UROLOGY

## 2024-07-21 PROCEDURE — 360N000082 HC SURGERY LEVEL 2 W/ FLUORO, PER MIN: Performed by: UROLOGY

## 2024-07-21 PROCEDURE — 99223 1ST HOSP IP/OBS HIGH 75: CPT | Mod: 25 | Performed by: UROLOGY

## 2024-07-21 PROCEDURE — 272N000001 HC OR GENERAL SUPPLY STERILE: Performed by: UROLOGY

## 2024-07-21 PROCEDURE — 258N000001 HC RX 258: Performed by: UROLOGY

## 2024-07-21 RX ORDER — AMOXICILLIN 250 MG
2 CAPSULE ORAL 2 TIMES DAILY PRN
Status: DISCONTINUED | OUTPATIENT
Start: 2024-07-21 | End: 2024-07-25 | Stop reason: HOSPADM

## 2024-07-21 RX ORDER — AMOXICILLIN 250 MG
1 CAPSULE ORAL 2 TIMES DAILY PRN
Status: DISCONTINUED | OUTPATIENT
Start: 2024-07-21 | End: 2024-07-25 | Stop reason: HOSPADM

## 2024-07-21 RX ORDER — LABETALOL HYDROCHLORIDE 5 MG/ML
10 INJECTION, SOLUTION INTRAVENOUS EVERY 5 MIN PRN
Status: DISCONTINUED | OUTPATIENT
Start: 2024-07-21 | End: 2024-07-21 | Stop reason: HOSPADM

## 2024-07-21 RX ORDER — DEXAMETHASONE SODIUM PHOSPHATE 4 MG/ML
4 INJECTION, SOLUTION INTRA-ARTICULAR; INTRALESIONAL; INTRAMUSCULAR; INTRAVENOUS; SOFT TISSUE
Status: DISCONTINUED | OUTPATIENT
Start: 2024-07-21 | End: 2024-07-21 | Stop reason: HOSPADM

## 2024-07-21 RX ORDER — OXYCODONE HYDROCHLORIDE 5 MG/1
5 TABLET ORAL
Status: DISCONTINUED | OUTPATIENT
Start: 2024-07-21 | End: 2024-07-21 | Stop reason: HOSPADM

## 2024-07-21 RX ORDER — DEXAMETHASONE SODIUM PHOSPHATE 4 MG/ML
INJECTION, SOLUTION INTRA-ARTICULAR; INTRALESIONAL; INTRAMUSCULAR; INTRAVENOUS; SOFT TISSUE PRN
Status: DISCONTINUED | OUTPATIENT
Start: 2024-07-21 | End: 2024-07-21

## 2024-07-21 RX ORDER — ACETAMINOPHEN 650 MG/1
650 SUPPOSITORY RECTAL EVERY 4 HOURS PRN
Status: DISCONTINUED | OUTPATIENT
Start: 2024-07-21 | End: 2024-07-25 | Stop reason: HOSPADM

## 2024-07-21 RX ORDER — METOPROLOL SUCCINATE 50 MG/1
50 TABLET, EXTENDED RELEASE ORAL DAILY
Status: DISCONTINUED | OUTPATIENT
Start: 2024-07-21 | End: 2024-07-21

## 2024-07-21 RX ORDER — ONDANSETRON 4 MG/1
4 TABLET, ORALLY DISINTEGRATING ORAL EVERY 30 MIN PRN
Status: DISCONTINUED | OUTPATIENT
Start: 2024-07-21 | End: 2024-07-21 | Stop reason: HOSPADM

## 2024-07-21 RX ORDER — ONDANSETRON 2 MG/ML
4 INJECTION INTRAMUSCULAR; INTRAVENOUS EVERY 30 MIN PRN
Status: DISCONTINUED | OUTPATIENT
Start: 2024-07-21 | End: 2024-07-21 | Stop reason: HOSPADM

## 2024-07-21 RX ORDER — SODIUM CHLORIDE, SODIUM LACTATE, POTASSIUM CHLORIDE, CALCIUM CHLORIDE 600; 310; 30; 20 MG/100ML; MG/100ML; MG/100ML; MG/100ML
INJECTION, SOLUTION INTRAVENOUS CONTINUOUS
Status: DISCONTINUED | OUTPATIENT
Start: 2024-07-21 | End: 2024-07-21 | Stop reason: HOSPADM

## 2024-07-21 RX ORDER — OXYCODONE HYDROCHLORIDE 5 MG/1
10 TABLET ORAL
Status: DISCONTINUED | OUTPATIENT
Start: 2024-07-21 | End: 2024-07-21 | Stop reason: HOSPADM

## 2024-07-21 RX ORDER — CALCIUM CARBONATE 500 MG/1
1000 TABLET, CHEWABLE ORAL 4 TIMES DAILY PRN
Status: DISCONTINUED | OUTPATIENT
Start: 2024-07-21 | End: 2024-07-25 | Stop reason: HOSPADM

## 2024-07-21 RX ORDER — EPHEDRINE SULFATE 50 MG/ML
INJECTION, SOLUTION INTRAMUSCULAR; INTRAVENOUS; SUBCUTANEOUS PRN
Status: DISCONTINUED | OUTPATIENT
Start: 2024-07-21 | End: 2024-07-21

## 2024-07-21 RX ORDER — LIDOCAINE 40 MG/G
CREAM TOPICAL
Status: DISCONTINUED | OUTPATIENT
Start: 2024-07-21 | End: 2024-07-21 | Stop reason: HOSPADM

## 2024-07-21 RX ORDER — LISINOPRIL 40 MG/1
40 TABLET ORAL DAILY
Status: DISCONTINUED | OUTPATIENT
Start: 2024-07-21 | End: 2024-07-25 | Stop reason: HOSPADM

## 2024-07-21 RX ORDER — ACETAMINOPHEN 325 MG/1
650 TABLET ORAL EVERY 4 HOURS PRN
Status: DISCONTINUED | OUTPATIENT
Start: 2024-07-21 | End: 2024-07-25 | Stop reason: HOSPADM

## 2024-07-21 RX ORDER — LIDOCAINE HYDROCHLORIDE 20 MG/ML
INJECTION, SOLUTION INFILTRATION; PERINEURAL PRN
Status: DISCONTINUED | OUTPATIENT
Start: 2024-07-21 | End: 2024-07-21

## 2024-07-21 RX ORDER — FENTANYL CITRATE 50 UG/ML
50 INJECTION, SOLUTION INTRAMUSCULAR; INTRAVENOUS EVERY 5 MIN PRN
Status: DISCONTINUED | OUTPATIENT
Start: 2024-07-21 | End: 2024-07-21 | Stop reason: HOSPADM

## 2024-07-21 RX ORDER — FENTANYL CITRATE 50 UG/ML
INJECTION, SOLUTION INTRAMUSCULAR; INTRAVENOUS PRN
Status: DISCONTINUED | OUTPATIENT
Start: 2024-07-21 | End: 2024-07-21

## 2024-07-21 RX ORDER — METOPROLOL SUCCINATE 50 MG/1
50 TABLET, EXTENDED RELEASE ORAL DAILY
Status: DISCONTINUED | OUTPATIENT
Start: 2024-07-22 | End: 2024-07-25 | Stop reason: HOSPADM

## 2024-07-21 RX ORDER — CEFTRIAXONE 1 G/1
1 INJECTION, POWDER, FOR SOLUTION INTRAMUSCULAR; INTRAVENOUS EVERY 24 HOURS
Status: DISCONTINUED | OUTPATIENT
Start: 2024-07-21 | End: 2024-07-21

## 2024-07-21 RX ORDER — FENTANYL CITRATE 50 UG/ML
25 INJECTION, SOLUTION INTRAMUSCULAR; INTRAVENOUS EVERY 5 MIN PRN
Status: DISCONTINUED | OUTPATIENT
Start: 2024-07-21 | End: 2024-07-21 | Stop reason: HOSPADM

## 2024-07-21 RX ORDER — ATORVASTATIN CALCIUM 40 MG/1
80 TABLET, FILM COATED ORAL DAILY
Status: DISCONTINUED | OUTPATIENT
Start: 2024-07-21 | End: 2024-07-25 | Stop reason: HOSPADM

## 2024-07-21 RX ORDER — CEFAZOLIN SODIUM 1 G/3ML
INJECTION, POWDER, FOR SOLUTION INTRAMUSCULAR; INTRAVENOUS PRN
Status: DISCONTINUED | OUTPATIENT
Start: 2024-07-21 | End: 2024-07-21

## 2024-07-21 RX ORDER — LIDOCAINE 40 MG/G
CREAM TOPICAL
Status: DISCONTINUED | OUTPATIENT
Start: 2024-07-21 | End: 2024-07-25 | Stop reason: HOSPADM

## 2024-07-21 RX ORDER — ACETAMINOPHEN 325 MG/1
975 TABLET ORAL ONCE
Status: DISCONTINUED | OUTPATIENT
Start: 2024-07-21 | End: 2024-07-21 | Stop reason: HOSPADM

## 2024-07-21 RX ORDER — NALOXONE HYDROCHLORIDE 0.4 MG/ML
0.1 INJECTION, SOLUTION INTRAMUSCULAR; INTRAVENOUS; SUBCUTANEOUS
Status: DISCONTINUED | OUTPATIENT
Start: 2024-07-21 | End: 2024-07-21 | Stop reason: HOSPADM

## 2024-07-21 RX ORDER — PROPOFOL 10 MG/ML
INJECTION, EMULSION INTRAVENOUS PRN
Status: DISCONTINUED | OUTPATIENT
Start: 2024-07-21 | End: 2024-07-21

## 2024-07-21 RX ORDER — TOLTERODINE 4 MG/1
4 CAPSULE, EXTENDED RELEASE ORAL DAILY
Status: DISCONTINUED | OUTPATIENT
Start: 2024-07-21 | End: 2024-07-25

## 2024-07-21 RX ORDER — HYDROMORPHONE HCL IN WATER/PF 6 MG/30 ML
0.4 PATIENT CONTROLLED ANALGESIA SYRINGE INTRAVENOUS EVERY 5 MIN PRN
Status: DISCONTINUED | OUTPATIENT
Start: 2024-07-21 | End: 2024-07-21 | Stop reason: HOSPADM

## 2024-07-21 RX ORDER — HYDROMORPHONE HCL IN WATER/PF 6 MG/30 ML
0.2 PATIENT CONTROLLED ANALGESIA SYRINGE INTRAVENOUS EVERY 5 MIN PRN
Status: DISCONTINUED | OUTPATIENT
Start: 2024-07-21 | End: 2024-07-21 | Stop reason: HOSPADM

## 2024-07-21 RX ADMIN — TOLTERODINE TARTRATE 4 MG: 4 CAPSULE, EXTENDED RELEASE ORAL at 14:50

## 2024-07-21 RX ADMIN — SODIUM CHLORIDE 3000 ML: 900 IRRIGANT IRRIGATION at 16:55

## 2024-07-21 RX ADMIN — CEFTRIAXONE 1 G: 1 INJECTION, POWDER, FOR SOLUTION INTRAMUSCULAR; INTRAVENOUS at 11:07

## 2024-07-21 RX ADMIN — PROPOFOL 150 MG: 10 INJECTION, EMULSION INTRAVENOUS at 08:08

## 2024-07-21 RX ADMIN — LISINOPRIL 40 MG: 40 TABLET ORAL at 11:07

## 2024-07-21 RX ADMIN — ACETAMINOPHEN 650 MG: 325 TABLET, FILM COATED ORAL at 14:46

## 2024-07-21 RX ADMIN — METOPROLOL SUCCINATE 50 MG: 50 TABLET, EXTENDED RELEASE ORAL at 06:41

## 2024-07-21 RX ADMIN — DEXAMETHASONE SODIUM PHOSPHATE 4 MG: 4 INJECTION, SOLUTION INTRA-ARTICULAR; INTRALESIONAL; INTRAMUSCULAR; INTRAVENOUS; SOFT TISSUE at 08:10

## 2024-07-21 RX ADMIN — Medication 10 MG: at 08:18

## 2024-07-21 RX ADMIN — Medication 5 MG: at 08:22

## 2024-07-21 RX ADMIN — SODIUM CHLORIDE, POTASSIUM CHLORIDE, SODIUM LACTATE AND CALCIUM CHLORIDE: 600; 310; 30; 20 INJECTION, SOLUTION INTRAVENOUS at 09:57

## 2024-07-21 RX ADMIN — PROPOFOL 50 MG: 10 INJECTION, EMULSION INTRAVENOUS at 08:10

## 2024-07-21 RX ADMIN — Medication 10 MG: at 08:12

## 2024-07-21 RX ADMIN — SODIUM CHLORIDE, POTASSIUM CHLORIDE, SODIUM LACTATE AND CALCIUM CHLORIDE: 600; 310; 30; 20 INJECTION, SOLUTION INTRAVENOUS at 07:43

## 2024-07-21 RX ADMIN — CEFAZOLIN 2 G: 1 INJECTION, POWDER, FOR SOLUTION INTRAMUSCULAR; INTRAVENOUS at 08:13

## 2024-07-21 RX ADMIN — SODIUM CHLORIDE 3000 ML: 900 IRRIGANT IRRIGATION at 22:34

## 2024-07-21 RX ADMIN — FENTANYL CITRATE 100 MCG: 50 INJECTION INTRAMUSCULAR; INTRAVENOUS at 08:08

## 2024-07-21 RX ADMIN — LIDOCAINE HYDROCHLORIDE 30 MG: 20 INJECTION, SOLUTION INFILTRATION; PERINEURAL at 08:08

## 2024-07-21 RX ADMIN — ATORVASTATIN CALCIUM 80 MG: 40 TABLET, FILM COATED ORAL at 11:07

## 2024-07-21 ASSESSMENT — ACTIVITIES OF DAILY LIVING (ADL)
ADLS_ACUITY_SCORE: 24
ADLS_ACUITY_SCORE: 23
ADLS_ACUITY_SCORE: 24
ADLS_ACUITY_SCORE: 24
ADLS_ACUITY_SCORE: 23
ADLS_ACUITY_SCORE: 24
ADLS_ACUITY_SCORE: 23
ADLS_ACUITY_SCORE: 23
ADLS_ACUITY_SCORE: 24
ADLS_ACUITY_SCORE: 23
ADLS_ACUITY_SCORE: 24
ADLS_ACUITY_SCORE: 23

## 2024-07-21 NOTE — ANESTHESIA POSTPROCEDURE EVALUATION
Patient: Issac Jackson    Procedure: Procedure(s):  CYSTOSCOPY, WITH FULGURATION OF HEMORRHAGING BLOOD VESSEL AND THROMBUS REMOVAL       Anesthesia Type:  General    Note:  Disposition: Inpatient   Postop Pain Control: Uneventful            Sign Out: Well controlled pain   PONV: No   Neuro/Psych: Uneventful            Sign Out: Acceptable/Baseline neuro status   Airway/Respiratory: Uneventful            Sign Out: Acceptable/Baseline resp. status   CV/Hemodynamics: Uneventful            Sign Out: Acceptable CV status; No obvious hypovolemia; No obvious fluid overload   Other NRE: NONE   DID A NON-ROUTINE EVENT OCCUR? No           Last vitals:  Vitals Value Taken Time   /67 07/21/24 0900   Temp 97.2  F (36.2  C) 07/21/24 0849   Pulse 69 07/21/24 0903   Resp 11 07/21/24 0903   SpO2 93 % 07/21/24 0903   Vitals shown include unfiled device data.    Electronically Signed By: Florida Shetty MD  July 21, 2024  9:04 AM

## 2024-07-21 NOTE — ANESTHESIA PREPROCEDURE EVALUATION
Anesthesia Pre-Procedure Evaluation    Patient: Issac Jackson   MRN: [id number] : 1955        Procedure : Procedure(s):  CYSTOSCOPY, WITH FULGURATION OF HEMORRHAGING BLOOD VESSEL AND THROMBUS REMOVAL          Past Medical History:   Diagnosis Date    Hypertension       Past Surgical History:   Procedure Laterality Date    HERNIA REPAIR        No Known Allergies   Social History     Tobacco Use    Smoking status: Not on file    Smokeless tobacco: Not on file   Substance Use Topics    Alcohol use: Not on file      Wt Readings from Last 1 Encounters:   24 84.8 kg (187 lb)        Anesthesia Evaluation   Pt has had prior anesthetic. Type: General.    No history of anesthetic complications       ROS/MED HX  ENT/Pulmonary:  - neg pulmonary ROS     Neurologic:  - neg neurologic ROS     Cardiovascular:     (+) Dyslipidemia hypertension- -  CAD -  - stent-   Taking blood thinners   CHF etiology: ischemic CM Last EF: 50-55%     fainting (syncope).                         METS/Exercise Tolerance:     Hematologic:     (+)      anemia,          Musculoskeletal:  - neg musculoskeletal ROS     GI/Hepatic:  - neg GI/hepatic ROS     Renal/Genitourinary: Comment: Gross hematuria    (+) renal disease,             Endo:     (+)               Obesity,       Psychiatric/Substance Use:  - neg psychiatric ROS     Infectious Disease:  - neg infectious disease ROS     Malignancy:  - neg malignancy ROS     Other:  - neg other ROS          Physical Exam    Airway        Mallampati: II   TM distance: > 3 FB   Neck ROM: full   Mouth opening: > 3 cm    Respiratory Devices and Support         Dental       (+) Edentulous and Removable bridges or other hardware      Cardiovascular   cardiovascular exam normal       Rhythm and rate: regular and normal     Pulmonary   pulmonary exam normal        breath sounds clear to auscultation           OUTSIDE LABS:  CBC:   Lab Results   Component Value Date    WBC 10.2 2024    WBC 8.0  "07/20/2024    HGB 12.5 (L) 07/21/2024    HGB 12.9 (L) 07/20/2024    HCT 37.9 (L) 07/21/2024    HCT 38 (L) 07/20/2024     07/21/2024     07/20/2024     BMP:   Lab Results   Component Value Date     07/20/2024     07/20/2024    POTASSIUM 4.3 07/20/2024    POTASSIUM 3.8 07/20/2024    CHLORIDE 106 07/20/2024    CHLORIDE 105 07/20/2024    CO2 23 07/20/2024    CO2 28 11/09/2023    BUN 20 07/20/2024    BUN 23.3 (H) 07/20/2024    CR 1.4 (H) 07/20/2024    CR 1.51 (H) 07/20/2024     (H) 07/20/2024     (H) 07/20/2024     COAGS:   Lab Results   Component Value Date    INR 0.88 10/05/2016     POC: No results found for: \"BGM\", \"HCG\", \"HCGS\"  HEPATIC: No results found for: \"ALBUMIN\", \"PROTTOTAL\", \"ALT\", \"AST\", \"GGT\", \"ALKPHOS\", \"BILITOTAL\", \"BILIDIRECT\", \"EMERALD\"  OTHER:   Lab Results   Component Value Date    JONELLE 9.1 07/20/2024    MAG 2.0 11/09/2023       Anesthesia Plan    ASA Status:  3, emergent    NPO Status:  NPO Appropriate    Anesthesia Type: General.     - Airway: LMA   Induction: Intravenous.   Maintenance: Inhalation.        Consents    Anesthesia Plan(s) and associated risks, benefits, and realistic alternatives discussed. Questions answered and patient/representative(s) expressed understanding.     - Discussed: Risks, Benefits and Alternatives for the PROCEDURE were discussed     - Discussed with:  Patient       Use of blood products discussed: Yes.     - Discussed with: Patient.     - Consented: consented to blood products            Reason for refusal: other.     Postoperative Care    Pain management: IV analgesics, Oral pain medications.   PONV prophylaxis: Ondansetron (or other 5HT-3), Dexamethasone or Solumedrol     Comments:    Other Comments: Syncopal episode in ED yesterday; LOC for 5 secs, hypotensive and anemic (Hgb 12). Received IVF rapid bolus. Asymptomatic today. PMH: STEMI (2016) with stents x2.     Plan: MAP > 70 mmHg. Avoid tachycardia, goal HR < 100. GA LMA. " "          Florida Shetty MD    I have reviewed the pertinent notes and labs in the chart from the past 30 days and (re)examined the patient.  Any updates or changes from those notes are reflected in this note.             # Drug Induced Platelet Defect: home medication list includes an antiplatelet medication  # Overweight: Estimated body mass index is 27.62 kg/m  as calculated from the following:    Height as of this encounter: 1.753 m (5' 9\").    Weight as of this encounter: 84.8 kg (187 lb).      "

## 2024-07-21 NOTE — OR NURSING
Cap off catheter after 2 current bags are complete.  LEAVE baig in, but capped off to assess urine out - Per Dr. Puckett

## 2024-07-21 NOTE — PROGRESS NOTES
[facility]    Medicine Progress Note - Hospitalist Service    Date of Admission:  7/20/2024    Assessment & Plan   Issac Jackson is a 68-year-old male with a medical history of STEMI, coronary artery disease status post stenting to the RCA and LAD in 2016 (on Plavix and Aspirin), ischemic cardiomyopathy, congestive heart failure, hypertension, hyperlipidemia, and a history of hernia repair who presented to the [facility] emergency department for evaluation of hematuria with clots and lower back pain x1 week.     Hematuria   Unlikely urinary tract infection:  The patient presented with painless hematuria with clots.  He endorses lower back pain x1 week, but no pain with urination.  Vital signs are stable, and there is no leukocytosis (WBC 8).  UA showed trace leukocyte esterase and WBC, but was negative for nitrite and bacteria.  Urine culture negative.  Abdomen/pelvis CT without contrast revealed dependent clot material within the urinary bladder and mild prostatomegaly, with no urinary tract calculi, hydronephrosis, or hydroureter.  Patient received CBI overnight and was taken for cystoscopy on 7/21 which showed small bleeding vessels in the suspected stop of catheter trauma and some at the bladder neck which were fulgurated.   -Monitor CBC  -Continue baig   -Urine culture negative so will discontinue ceftriaxone  -Follow up urine culture   -Consult formal urology  -In discussion with nurse, urine continues to be red so he will need to remain hospitalized  -Holding asa, plavix     CHF   CAD s/p stenting to the RCA and LAD in 2016  Hypertension   Hyperlipidemia:  The most recent echocardiogram from 2016 indicated a normal left ventricular (LV) size with mildly reduced LV systolic function and an ejection fraction (EF) of 45%. It also revealed mild concentric left ventricular hypertrophy (LVH). No additional echocardiograms were found during the chart review.  The patient has a known  "history of CAD for which his cardiology has recommended long term plavix.    -Hold Plavix and Aspirin for hematuria, may have to consult with cardiology for prolonged hematuria   -Continue PTA Lisinopril, Metoprolol, and Atorvastatin          Diet: Combination Diet Regular Diet Adult    DVT Prophylaxis: Pneumatic Compression Devices  Jansen Catheter: PRESENT, indication: Gross hematuria, Gross hematuria;Surgical procedure  Lines: None     Cardiac Monitoring: None  Code Status: Full Code      Clinically Significant Risk Factors                               # Overweight: Estimated body mass index is 27.62 kg/m  as calculated from the following:    Height as of this encounter: 1.753 m (5' 9\").    Weight as of this encounter: 84.8 kg (187 lb)., PRESENT ON ADMISSION            Disposition Plan     Medically Ready for Discharge: Anticipated Tomorrow    Ruben Begum DO  Hospitalist Service  [facility]  Securely message with [employer] (more info)  Text page via [employer] Paging/Directory   ______________________________________________________________________    Interval History   NAEO.  Patient feels fine.  No complaint.  We discussed his use of DAPT.    Physical Exam   Vital Signs: Temp: 97.6  F (36.4  C) Temp src: Oral BP: (!) 141/72 Pulse: 73   Resp: 12 SpO2: 97 % O2 Device: None (Room air)    Weight: 187 lbs 0 oz    General Appearance: Patient awake & alert.  No apparent distress.   Respiratory: Lungs are CTAB.  Work of breathing appears normal on room air.  Cardiovascular: Regular rate and rhythm.  No murmurs rubs or gallops.  There is no edema present.  GI: Benign.  Soft.  Non-tender.  Bowel sounds active.   Skin: No rashes or lesions exposed skin.  Neuro:  The patient is moving all extremities.  No obvious focal asymmetries.   Other: Patient is appropriate and oriented x3.  Jansen in place with clear urine in the bag - some red urine in the catheter tube.    Medical Decision Making       50 MINUTES " SPENT BY ME on the date of service doing chart review, history, exam, documentation & further activities per the note.      Data   ------------------------- PAST 24 HR DATA REVIEWED -----------------------------------------------    I have personally reviewed the following data over the past 24 hrs:    10.2  \   12.5 (L)   / 282     142 110 (H) 15.6 /  109 (H)   4.3 22 1.26 (H) \     ALT: 20 AST: 20 AP: 103 TBILI: 0.8   ALB: 3.8 TOT PROTEIN: 6.2 (L) LIPASE: N/A       Imaging results reviewed over the past 24 hrs:   No results found for this or any previous visit (from the past 24 hour(s)).

## 2024-07-21 NOTE — ANESTHESIA PROCEDURE NOTES
Airway       Patient location during procedure: OR  Staff -        CRNA: Francisco Cowan APRN CRNA       Performed By: CRNA  Consent for Airway        Urgency: elective  Indications and Patient Condition       Indications for airway management: salty-procedural       Induction type:intravenous       Mask difficulty assessment: 1 - vent by mask    Final Airway Details       Final airway type: supraglottic airway    Supraglottic Airway Details        Type: LMA       Brand: I-Gel       LMA size: 5    Post intubation assessment        Placement verified by: capnometry, equal breath sounds and chest rise        Number of attempts at approach: 1       Secured with: commercial tube gandhi       Ease of procedure: easy       Dentition: Intact and Unchanged

## 2024-07-21 NOTE — PLAN OF CARE
"nd of Shift Summary  For vital signs and complete assessments, please see documentation flowsheets.     Pertinent assessments: A&Ox4. Elevated BP, other VSS on RA. Denies pain. Baig in place with CBI, draining red urine. No clots noted. NPO since midnight. SBA to bathroom.   Major Shift Events: Pt taken to pre-surgery area for cysto at 0655  Treatment Plan: Rocephin, CBI, baig, cystoscopy today    Plan of Care Reviewed With: patient    Overall Patient Progress: improving  Outcome Evaluation: Denies pain. Baig with CBI patent, with bloody urine.      Problem: Adult Inpatient Plan of Care  Goal: Plan of Care Review  Description: The Plan of Care Review/Shift note should be completed every shift.  The Outcome Evaluation is a brief statement about your assessment that the patient is improving, declining, or no change.  This information will be displayed automatically on your shift  note.  Outcome: Progressing  Flowsheets (Taken 7/21/2024 0653)  Outcome Evaluation: Denies pain. Baig with CBI patent, with bloody urine.  Plan of Care Reviewed With: patient  Overall Patient Progress: improving  Goal: Patient-Specific Goal (Individualized)  Description: You can add care plan individualizations to a care plan. Examples of Individualization might be:  \"Parent requests to be called daily at 9am for status\", \"I have a hard time hearing out of my right ear\", or \"Do not touch me to wake me up as it startles  me\".  Outcome: Progressing  Goal: Absence of Hospital-Acquired Illness or Injury  Outcome: Progressing  Intervention: Identify and Manage Fall Risk  Recent Flowsheet Documentation  Taken 7/21/2024 0025 by Gin Ross, RN  Safety Promotion/Fall Prevention: safety round/check completed  Intervention: Prevent Skin Injury  Recent Flowsheet Documentation  Taken 7/21/2024 0025 by Gin Ross, RN  Body Position: position changed independently  Intervention: Prevent Infection  Recent Flowsheet Documentation  Taken 7/21/2024 " 0025 by Gin Ross RN  Infection Prevention: rest/sleep promoted  Goal: Optimal Comfort and Wellbeing  Outcome: Progressing  Goal: Readiness for Transition of Care  Outcome: Progressing     Problem: Pain Acute  Goal: Optimal Pain Control and Function  Outcome: Progressing  Intervention: Prevent or Manage Pain  Recent Flowsheet Documentation  Taken 7/21/2024 0025 by Gin Ross RN  Medication Review/Management: medications reviewed     Problem: Comorbidity Management  Goal: Blood Pressure in Desired Range  Outcome: Progressing  Intervention: Maintain Blood Pressure Management  Recent Flowsheet Documentation  Taken 7/21/2024 0025 by Gin Ross RN  Medication Review/Management: medications reviewed

## 2024-07-21 NOTE — OP NOTE
OPERATIVE NOTE    PREOPERATIVE DIAGNOSIS:   Gross hematuria [R31.0]    POSTOPERATIVE DIAGNOSIS:  Same    PROCEDURES PERFORMED:   Procedure(s):  CYSTOSCOPY, WITH FULGURATION OF HEMORRHAGING BLOOD VESSEL AND THROMBUS REMOVAL    STAFF SURGEON:  Surgeon(s):  Noah Adame MD    RESIDENT(S):  Claudy Dotson MD    ANESTHESIA:  General    ESTIMATED BLOOD LOSS: 5 mL    IV FLUIDS: see dictated anesthesia record    COMPLICATIONS: None.     SPECIMEN:    * No specimens in log *    SIGNIFICANT FINDINGS:   Large blood clot ~100 mL, no tumors, several small bleeders around bladder neck    BRIEF OPERATIVE INDICATIONS: Issac Jackson is a 68 year old male who presented with gross hematuria and clot retention. After a discussion of the risks, benefits, and alternatives they elected to proceed with the aforementioned procedure.     DESCRIPTION OF PROCEDURE: After full informed voluntary consent was obtained, the patient was transported to the operating room, placed supine on the table. After adequate anesthesia was induced, they were prepped and draped in the usual sterile fashion and placed into lithotomy. A timeout was taken to confirm correct patient, procedure and laterality.    A 26 Fr resectoscope was placed into a well lubricated urethra. The urethra was unremarkable without stricture. The prostate was short and non-obstructing. There was significant blood clot within the bladder which was irrigated out until the media was clear.     The bilateral ureteral orifices were orthotopic and effluxing clear yellow urine. There were no tumors, stones, or diverticuli. There were only small bleeding vessels in a suspected spot of catheter trauma and some at the bladder neck which were fulgurated as well as within the prostatic urethra. Following this, the media was crystal clear.     The scope was removed and a 22Fr 3-way catheter was placed into the bladder. 30 mL was placed in the balloon. This concluded the procedure.     Patient  tolerated the procedure well. No apparent complications. They were transported to the postanesthesia care unit in stable condition.    Claudy Dotson MD   Urology Resident PGY-5

## 2024-07-21 NOTE — PLAN OF CARE
"To Do:  End of Shift Summary  For vital signs and complete assessments, please see documentation flowsheets.     Pertinent assessments: A&Ox4. Elevated BP, other VSS on RA. Baig in place with CBI. Draining red. Hand irrigated once for clots. Cysto scheduled for tomorrow. Tolerating diet. NPO at midnight. SBA to bathroom.   Major Shift Events: admitted to unit, CBI  Treatment Plan: Rocephin, CBI, baig, cystoscopy tomorrow   Bedside Nurse: Mary Monge RN     Goal Outcome Evaluation:      Plan of Care Reviewed With: patient, spouse    Overall Patient Progress: no changeOverall Patient Progress: no change    Outcome Evaluation: Baig draining red. One clot hand irrigated. NPO at midnight for cysto.      Problem: Adult Inpatient Plan of Care  Goal: Plan of Care Review  Description: The Plan of Care Review/Shift note should be completed every shift.  The Outcome Evaluation is a brief statement about your assessment that the patient is improving, declining, or no change.  This information will be displayed automatically on your shift  note.  Outcome: Progressing  Flowsheets (Taken 7/20/2024 2301)  Outcome Evaluation: Baig draining red. One clot hand irrigated. NPO at midnight for cysto.  Plan of Care Reviewed With:   patient   spouse  Overall Patient Progress: no change  Goal: Patient-Specific Goal (Individualized)  Description: You can add care plan individualizations to a care plan. Examples of Individualization might be:  \"Parent requests to be called daily at 9am for status\", \"I have a hard time hearing out of my right ear\", or \"Do not touch me to wake me up as it startles  me\".  Outcome: Progressing  Goal: Absence of Hospital-Acquired Illness or Injury  Outcome: Progressing  Intervention: Identify and Manage Fall Risk  Recent Flowsheet Documentation  Taken 7/20/2024 1623 by Mary Monge, RN  Safety Promotion/Fall Prevention:   increased rounding and observation   increase visualization of patient   " room near nurse's station   safety round/check completed  Intervention: Prevent Skin Injury  Recent Flowsheet Documentation  Taken 7/20/2024 1623 by Mary Monge RN  Body Position: position changed independently  Intervention: Prevent Infection  Recent Flowsheet Documentation  Taken 7/20/2024 1623 by Mary Monge RN  Infection Prevention:   rest/sleep promoted   single patient room provided   hand hygiene promoted  Goal: Optimal Comfort and Wellbeing  Outcome: Progressing  Intervention: Monitor Pain and Promote Comfort  Recent Flowsheet Documentation  Taken 7/20/2024 1623 by Mary Monge RN  Pain Management Interventions: declines  Goal: Readiness for Transition of Care  Outcome: Progressing  Intervention: Mutually Develop Transition Plan  Recent Flowsheet Documentation  Taken 7/20/2024 1627 by Mary Monge RN  Equipment Currently Used at Home: none     Problem: Pain Acute  Goal: Optimal Pain Control and Function  Outcome: Progressing  Intervention: Develop Pain Management Plan  Recent Flowsheet Documentation  Taken 7/20/2024 1623 by Mary Monge RN  Pain Management Interventions: declines  Intervention: Prevent or Manage Pain  Recent Flowsheet Documentation  Taken 7/20/2024 1623 by Mary Monge RN  Medication Review/Management: medications reviewed     Problem: Comorbidity Management  Goal: Blood Pressure in Desired Range  Outcome: Progressing  Intervention: Maintain Blood Pressure Management  Recent Flowsheet Documentation  Taken 7/20/2024 1623 by Mary Monge RN  Medication Review/Management: medications reviewed

## 2024-07-21 NOTE — ANESTHESIA CARE TRANSFER NOTE
Patient: Issac Jackson    Procedure: Procedure(s):  CYSTOSCOPY, WITH FULGURATION OF HEMORRHAGING BLOOD VESSEL AND THROMBUS REMOVAL       Diagnosis: Gross hematuria [R31.0]  Diagnosis Additional Information: No value filed.    Anesthesia Type:   General     Note:    Oropharynx: oropharynx clear of all foreign objects  Level of Consciousness: awake  Oxygen Supplementation: face mask  Level of Supplemental Oxygen (L/min / FiO2): 5  Independent Airway: airway patency satisfactory and stable  Dentition: dentition unchanged  Vital Signs Stable: post-procedure vital signs reviewed and stable  Report to RN Given: handoff report given  Patient transferred to: PACU    Handoff Report: Identifed the Patient, Identified the Reponsible Provider, Reviewed the pertinent medical history, Discussed the surgical course, Reviewed Intra-OP anesthesia mangement and issues during anesthesia, Set expectations for post-procedure period and Allowed opportunity for questions and acknowledgement of understanding      Vitals:  Vitals Value Taken Time   BP     Temp     Pulse     Resp     SpO2         Electronically Signed By: RY Gamble CRNA  July 21, 2024  8:47 AM

## 2024-07-21 NOTE — BRIEF OP NOTE
[facility]    Brief Operative Note    Pre-operative diagnosis: Gross hematuria [R31.0]  Post-operative diagnosis Same as pre-operative diagnosis    Procedure: CYSTOSCOPY, WITH FULGURATION OF HEMORRHAGING BLOOD VESSEL AND THROMBUS REMOVAL, N/A - Urethra    Surgeon: Surgeons and Role:     * Noah Adame MD - Primary  Anesthesia: General   Estimated Blood Loss: Minimal    Drains: 22F 3-way catheter, 30 mL in catheter balloon  Specimens: * No specimens in log *  Findings:   Generalized oozing around bladder neck and from catheter, large amount of clot removed. Clear efflux from bilateral Uos. No bladder tumors seen.  .  Complications: None.  Implants: * No implants in log *

## 2024-07-22 LAB
ANION GAP SERPL CALCULATED.3IONS-SCNC: 11 MMOL/L (ref 7–15)
ATRIAL RATE - MUSE: 56 BPM
BUN SERPL-MCNC: 14.4 MG/DL (ref 8–23)
CALCIUM SERPL-MCNC: 8.9 MG/DL (ref 8.8–10.4)
CHLORIDE SERPL-SCNC: 105 MMOL/L (ref 98–107)
CREAT SERPL-MCNC: 1.34 MG/DL (ref 0.67–1.17)
DIASTOLIC BLOOD PRESSURE - MUSE: NORMAL MMHG
EGFRCR SERPLBLD CKD-EPI 2021: 57 ML/MIN/1.73M2
ERYTHROCYTE [DISTWIDTH] IN BLOOD BY AUTOMATED COUNT: 13.2 % (ref 10–15)
GLUCOSE SERPL-MCNC: 103 MG/DL (ref 70–99)
HCO3 SERPL-SCNC: 24 MMOL/L (ref 22–29)
HCT VFR BLD AUTO: 36.4 % (ref 40–53)
HGB BLD-MCNC: 12.1 G/DL (ref 13.3–17.7)
INTERPRETATION ECG - MUSE: NORMAL
MCH RBC QN AUTO: 31.3 PG (ref 26.5–33)
MCHC RBC AUTO-ENTMCNC: 33.2 G/DL (ref 31.5–36.5)
MCV RBC AUTO: 94 FL (ref 78–100)
P AXIS - MUSE: 16 DEGREES
PLATELET # BLD AUTO: 244 10E3/UL (ref 150–450)
POTASSIUM SERPL-SCNC: 4.1 MMOL/L (ref 3.4–5.3)
PR INTERVAL - MUSE: 162 MS
QRS DURATION - MUSE: 102 MS
QT - MUSE: 410 MS
QTC - MUSE: 395 MS
R AXIS - MUSE: 36 DEGREES
RBC # BLD AUTO: 3.86 10E6/UL (ref 4.4–5.9)
SODIUM SERPL-SCNC: 140 MMOL/L (ref 135–145)
SYSTOLIC BLOOD PRESSURE - MUSE: NORMAL MMHG
T AXIS - MUSE: -10 DEGREES
VENTRICULAR RATE- MUSE: 56 BPM
WBC # BLD AUTO: 14.1 10E3/UL (ref 4–11)

## 2024-07-22 PROCEDURE — 80048 BASIC METABOLIC PNL TOTAL CA: CPT | Performed by: STUDENT IN AN ORGANIZED HEALTH CARE EDUCATION/TRAINING PROGRAM

## 2024-07-22 PROCEDURE — 85027 COMPLETE CBC AUTOMATED: CPT | Performed by: STUDENT IN AN ORGANIZED HEALTH CARE EDUCATION/TRAINING PROGRAM

## 2024-07-22 PROCEDURE — 99232 SBSQ HOSP IP/OBS MODERATE 35: CPT | Performed by: STUDENT IN AN ORGANIZED HEALTH CARE EDUCATION/TRAINING PROGRAM

## 2024-07-22 PROCEDURE — 120N000001 HC R&B MED SURG/OB

## 2024-07-22 PROCEDURE — 36415 COLL VENOUS BLD VENIPUNCTURE: CPT | Performed by: STUDENT IN AN ORGANIZED HEALTH CARE EDUCATION/TRAINING PROGRAM

## 2024-07-22 PROCEDURE — 99231 SBSQ HOSP IP/OBS SF/LOW 25: CPT | Performed by: PHYSICIAN ASSISTANT

## 2024-07-22 PROCEDURE — 258N000001 HC RX 258

## 2024-07-22 PROCEDURE — 250N000013 HC RX MED GY IP 250 OP 250 PS 637: Performed by: NURSE PRACTITIONER

## 2024-07-22 PROCEDURE — 250N000013 HC RX MED GY IP 250 OP 250 PS 637: Performed by: STUDENT IN AN ORGANIZED HEALTH CARE EDUCATION/TRAINING PROGRAM

## 2024-07-22 RX ADMIN — METOPROLOL SUCCINATE 50 MG: 50 TABLET, EXTENDED RELEASE ORAL at 08:08

## 2024-07-22 RX ADMIN — LISINOPRIL 40 MG: 40 TABLET ORAL at 08:08

## 2024-07-22 RX ADMIN — ATORVASTATIN CALCIUM 80 MG: 40 TABLET, FILM COATED ORAL at 08:07

## 2024-07-22 RX ADMIN — TOLTERODINE TARTRATE 4 MG: 4 CAPSULE, EXTENDED RELEASE ORAL at 08:07

## 2024-07-22 RX ADMIN — SODIUM CHLORIDE 3000 ML: 900 IRRIGANT IRRIGATION at 19:42

## 2024-07-22 ASSESSMENT — ACTIVITIES OF DAILY LIVING (ADL)
ADLS_ACUITY_SCORE: 24
ADLS_ACUITY_SCORE: 22
ADLS_ACUITY_SCORE: 24
ADLS_ACUITY_SCORE: 24
ADLS_ACUITY_SCORE: 23
ADLS_ACUITY_SCORE: 24
ADLS_ACUITY_SCORE: 23
ADLS_ACUITY_SCORE: 23
ADLS_ACUITY_SCORE: 24
ADLS_ACUITY_SCORE: 23
ADLS_ACUITY_SCORE: 24
ADLS_ACUITY_SCORE: 23
ADLS_ACUITY_SCORE: 24

## 2024-07-22 NOTE — PLAN OF CARE
A&Ox4. Up independent in room. Denies pain. Reports some bladder spasms with activity, okay when at rest. CBI running at slow rate, good urine output. Urine clear with no clots. Urology following. Discharge pending.     Problem: Adult Inpatient Plan of Care  Goal: Plan of Care Review  Description: The Plan of Care Review/Shift note should be completed every shift.  The Outcome Evaluation is a brief statement about your assessment that the patient is improving, declining, or no change.  This information will be displayed automatically on your shift  note.  Outcome: Progressing  Flowsheets (Taken 7/22/2024 1852)  Outcome Evaluation: CBI running slow rate, urine clear. Reported some bladder spasms while ambulating, declined intervention. Denies pain.  Plan of Care Reviewed With: patient  Overall Patient Progress: improving  Goal: Absence of Hospital-Acquired Illness or Injury  Outcome: Progressing  Intervention: Identify and Manage Fall Risk  Recent Flowsheet Documentation  Taken 7/22/2024 1800 by Kelin Miranda, RN  Safety Promotion/Fall Prevention: safety round/check completed  Intervention: Prevent Skin Injury  Recent Flowsheet Documentation  Taken 7/22/2024 1800 by Kelin Miranda RN  Body Position: position changed independently  Goal: Optimal Comfort and Wellbeing  Outcome: Progressing  Goal: Readiness for Transition of Care  Outcome: Progressing     Problem: Pain Acute  Goal: Optimal Pain Control and Function  Outcome: Progressing     Problem: Comorbidity Management  Goal: Blood Pressure in Desired Range  Outcome: Progressing     Problem: Skin Injury Risk Increased  Goal: Skin Health and Integrity  Outcome: Progressing  Intervention: Optimize Skin Protection  Recent Flowsheet Documentation  Taken 7/22/2024 1800 by Kelin Miranda, RN  Activity Management: up ad anna

## 2024-07-22 NOTE — PLAN OF CARE
"To Do:  End of Shift Summary  For vital signs and complete assessments, please see documentation flowsheets.     Pertinent assessments: A&Ox4. VSS on RA. Urine draining red before CBI started. Draining light pink since CBI started back up. Some mild discomfort from catheter, declines needing pain medication. Denies SOB, nausea. Tolerating regular diet. SBA with IV pole.     Major Shift Events: CBI started back up, draining light pink, clear.     Treatment Plan: CBI, baig, discharge pending.     Bedside Nurse: Karla Monge RN     Goal Outcome Evaluation:      Plan of Care Reviewed With: patient    Overall Patient Progress: improvingOverall Patient Progress: improving    Outcome Evaluation: CBI, urine much lighter, Light pink color. C/o discomfort from catheter.      Problem: Adult Inpatient Plan of Care  Goal: Plan of Care Review  Description: The Plan of Care Review/Shift note should be completed every shift.  The Outcome Evaluation is a brief statement about your assessment that the patient is improving, declining, or no change.  This information will be displayed automatically on your shift  note.  Outcome: Progressing  Flowsheets (Taken 7/21/2024 3358)  Outcome Evaluation: CBI, urine much lighter, Light pink color. C/o discomfort from catheter.  Plan of Care Reviewed With: patient  Overall Patient Progress: improving  Goal: Patient-Specific Goal (Individualized)  Description: You can add care plan individualizations to a care plan. Examples of Individualization might be:  \"Parent requests to be called daily at 9am for status\", \"I have a hard time hearing out of my right ear\", or \"Do not touch me to wake me up as it startles  me\".  Outcome: Progressing  Goal: Absence of Hospital-Acquired Illness or Injury  Outcome: Progressing  Intervention: Identify and Manage Fall Risk  Recent Flowsheet Documentation  Taken 7/21/2024 5935 by Mary Monge, RN  Safety Promotion/Fall Prevention:   activity " supervised   increase visualization of patient   increased rounding and observation   room near nurse's station   safety round/check completed  Intervention: Prevent and Manage VTE (Venous Thromboembolism) Risk  Recent Flowsheet Documentation  Taken 7/21/2024 1655 by Mary Monge RN  VTE Prevention/Management: SCDs off (sequential compression devices)  Intervention: Prevent Infection  Recent Flowsheet Documentation  Taken 7/21/2024 1655 by Mary Monge RN  Infection Prevention:   rest/sleep promoted   hand hygiene promoted   single patient room provided  Goal: Optimal Comfort and Wellbeing  Outcome: Progressing  Intervention: Monitor Pain and Promote Comfort  Recent Flowsheet Documentation  Taken 7/21/2024 1550 by Mary Monge RN  Pain Management Interventions: medication offered but refused  Goal: Readiness for Transition of Care  Outcome: Progressing     Problem: Pain Acute  Goal: Optimal Pain Control and Function  Outcome: Progressing  Intervention: Develop Pain Management Plan  Recent Flowsheet Documentation  Taken 7/21/2024 1550 by Mary Monge RN  Pain Management Interventions: medication offered but refused  Intervention: Prevent or Manage Pain  Recent Flowsheet Documentation  Taken 7/21/2024 1655 by Mary Monge RN  Medication Review/Management: medications reviewed     Problem: Comorbidity Management  Goal: Blood Pressure in Desired Range  Outcome: Progressing  Intervention: Maintain Blood Pressure Management  Recent Flowsheet Documentation  Taken 7/21/2024 1655 by Mary Monge RN  Medication Review/Management: medications reviewed     Problem: Skin Injury Risk Increased  Goal: Skin Health and Integrity  Outcome: Progressing  Intervention: Plan: Nurse Driven Intervention: Moisture Management  Recent Flowsheet Documentation  Taken 7/21/2024 1854 by Mray Monge RN  Moisture Interventions: Encourage regular toileting  Bathing/Skin Care: other (see  comments)

## 2024-07-22 NOTE — PROGRESS NOTES
[facility]    Medicine Progress Note - Hospitalist Service    Date of Admission:  7/20/2024    Assessment & Plan   Issac Jackson is a 68-year-old male with a medical history of STEMI, coronary artery disease status post stenting to the RCA and LAD in 2016 (on Plavix and Aspirin), ischemic cardiomyopathy, congestive heart failure, hypertension, hyperlipidemia, and a history of hernia repair who presented to the [facility] emergency department for evaluation of hematuria with clots and lower back pain x1 week.     Hematuria   Unlikely urinary tract infection:  The patient presented with painless hematuria with clots.  He endorses lower back pain x1 week, but no pain with urination.  Vital signs are stable, and there is no leukocytosis (WBC 8).  UA showed trace leukocyte esterase and WBC, but was negative for nitrite and bacteria.  Urine culture negative.  Abdomen/pelvis CT without contrast revealed dependent clot material within the urinary bladder and mild prostatomegaly, with no urinary tract calculi, hydronephrosis, or hydroureter.  Patient received CBI overnight and was taken for cystoscopy on 7/21 which showed small bleeding vessels in the suspected stop of catheter trauma and some at the bladder neck which were fulgurated.   -Urology following - discussed with them today  -Continue baig & CBI per urology  -Urine culture negative so will discontinue ceftriaxone  -Holding asa, plavix until urine clear for at least 24 hours per urology  -Patient is trying to get sylvester of his cardiology team to discuss need for DAPT     CHF   CAD s/p stenting to the RCA and LAD in 2016  Hypertension   Hyperlipidemia:  The most recent echocardiogram from 2016 indicated a normal left ventricular (LV) size with mildly reduced LV systolic function and an ejection fraction (EF) of 45%. It also revealed mild concentric left ventricular hypertrophy (LVH). No additional echocardiograms were found during the  "chart review.  The patient has a known history of CAD for which his cardiology has recommended long term plavix.    -Hold Plavix and Aspirin as abovew  -Continue PTA Lisinopril, Metoprolol, and Atorvastatin          Diet: Combination Diet Regular Diet Adult    DVT Prophylaxis: Pneumatic Compression Devices  Jansen Catheter: PRESENT, indication: Gross hematuria, Surgical procedure;Gross hematuria  Lines: None     Cardiac Monitoring: None  Code Status: Full Code      Clinically Significant Risk Factors                               # Overweight: Estimated body mass index is 27.62 kg/m  as calculated from the following:    Height as of this encounter: 1.753 m (5' 9\").    Weight as of this encounter: 84.8 kg (187 lb)., PRESENT ON ADMISSION            Disposition Plan     Medically Ready for Discharge: Anticipated Tomorrow    Ruben Begum DO  Hospitalist Service  [facility]  Securely message with [employer] (more info)  Text page via [employer] Paging/Directory   ______________________________________________________________________    Interval History   No events overnight.  Required CBI.  Some occasional bladder spasms when moving.  Urine with some sediment today but more clear.     Physical Exam   Vital Signs: Temp: 97.9  F (36.6  C) Temp src: Oral BP: 121/72 Pulse: 68   Resp: 18 SpO2: 96 % O2 Device: None (Room air)    Weight: 187 lbs 0 oz    General Appearance: Patient awake & alert.  No apparent distress.   Respiratory: Lungs are CTAB.  Work of breathing appears normal on room air.  Cardiovascular: Regular rate and rhythm.  No murmurs rubs or gallops.  There is no edema present.  GI: Benign.  Soft.  Non-tender.  Bowel sounds active.   Skin: No rashes or lesions exposed skin.  Neuro:  The patient is moving all extremities.  No obvious focal asymmetries.   Other: Patient is appropriate..  Jansen in place with yellow/brownish urine.    Medical Decision Making       50 MINUTES SPENT BY ME on the date of " service doing chart review, history, exam, documentation & further activities per the note.      Data   ------------------------- PAST 24 HR DATA REVIEWED -----------------------------------------------    I have personally reviewed the following data over the past 24 hrs:    14.1 (H)  \   12.1 (L)   / 244     140 105 14.4 /  103 (H)   4.1 24 1.34 (H) \       Imaging results reviewed over the past 24 hrs:   No results found for this or any previous visit (from the past 24 hour(s)).

## 2024-07-22 NOTE — PLAN OF CARE
"End of Shift Summary  For vital signs and complete assessments, please see documentation flowsheets.     Pertinent assessments: VSS on RA. Denies pain except for mild discomfort from catheter, declines intervention. SBA with IV pole. CBI running at slow rate, urine is light clear yellow.     Major Shift Events: uneventful     Treatment Plan: CBI, baig, discharge pending.     Plan of Care Reviewed With: patient  Overall Patient Progress: improving  Outcome Evaluation: CBI running at slow rate, urine is light, clear yellow.      Problem: Adult Inpatient Plan of Care  Goal: Plan of Care Review  Description: The Plan of Care Review/Shift note should be completed every shift.  The Outcome Evaluation is a brief statement about your assessment that the patient is improving, declining, or no change.  This information will be displayed automatically on your shift  note.  Outcome: Progressing  Flowsheets (Taken 7/22/2024 0539)  Outcome Evaluation: CBI running at slow rate, urine is light, clear yellow.  Plan of Care Reviewed With: patient  Overall Patient Progress: improving  Goal: Patient-Specific Goal (Individualized)  Description: You can add care plan individualizations to a care plan. Examples of Individualization might be:  \"Parent requests to be called daily at 9am for status\", \"I have a hard time hearing out of my right ear\", or \"Do not touch me to wake me up as it startles  me\".  Outcome: Progressing  Goal: Absence of Hospital-Acquired Illness or Injury  Outcome: Progressing  Intervention: Identify and Manage Fall Risk  Recent Flowsheet Documentation  Taken 7/22/2024 0015 by Gin Ross, RN  Safety Promotion/Fall Prevention: safety round/check completed  Intervention: Prevent Skin Injury  Recent Flowsheet Documentation  Taken 7/22/2024 0015 by Gin Ross, RN  Body Position: position changed independently  Intervention: Prevent Infection  Recent Flowsheet Documentation  Taken 7/22/2024 0015 by Gin Ross, " RN  Infection Prevention: hand hygiene promoted  Goal: Optimal Comfort and Wellbeing  Outcome: Progressing  Goal: Readiness for Transition of Care  Outcome: Progressing     Problem: Pain Acute  Goal: Optimal Pain Control and Function  Outcome: Progressing  Intervention: Prevent or Manage Pain  Recent Flowsheet Documentation  Taken 7/22/2024 0015 by Gin Ross RN  Medication Review/Management: medications reviewed     Problem: Comorbidity Management  Goal: Blood Pressure in Desired Range  Outcome: Progressing  Intervention: Maintain Blood Pressure Management  Recent Flowsheet Documentation  Taken 7/22/2024 0015 by Gin Ross RN  Medication Review/Management: medications reviewed     Problem: Skin Injury Risk Increased  Goal: Skin Health and Integrity  Outcome: Progressing  Intervention: Plan: Nurse Driven Intervention: Moisture Management  Recent Flowsheet Documentation  Taken 7/22/2024 0015 by Gin Ross RN  Moisture Interventions: Encourage regular toileting  Intervention: Optimize Skin Protection  Recent Flowsheet Documentation  Taken 7/22/2024 0015 by Gin Ross RN  Activity Management: activity adjusted per tolerance

## 2024-07-22 NOTE — PROGRESS NOTES
[facility] Urology Progress Note          Assessment and Plan:     Assessment:    POD 1 Cystoscopy with fulguration of hemorrhaging blood vessel and thrombus removal    Gross hematuria    CAD      Plan:   -Continue with 3-way Jansen catheter with CBI.  Wean CBI as able.  If it remains clear, okay to clamp and see if urine remains clears.  If so, can plug irrigation port.  -Hand irrigate Jansen catheter, as needed, for bladder spasms, clots, or decreased output.  -Continue Detrol 4 mg for bladder spasms.  -Continue to monitor hemoglobin.  -Can cautiously restart blood thinner (if determined that he should continue on) after urine has been clear for 24 hours.  -Will continue to follow along.    Patience Bethea PA-C   [facility]  [phone]               Interval History:     Doing well.  Some mild discomfort at catheter/meatus.  Having bladder spasms with leakage around catheter at times.  3-way Jansen catheter in place with CBI on a slow rate.  Urine is clear rosita.  Wife at bedside.  Afebrile.  No lightheaded or dizziness.  Denies N/V/F.  Hgb 12.1(12.5(13.4)).  WBC 14.1 Creatinine 1.34 eGFR 57 (1.26 eGFR 62).              Review of Systems:     The 5 point Review of Systems is negative other than noted in the HPI             Medications:     Current Facility-Administered Medications   Medication Dose Route Frequency Provider Last Rate Last Admin    acetaminophen (TYLENOL) tablet 650 mg  650 mg Oral Q4H PRN Ruben Begum, DO   650 mg at 07/21/24 1446    Or    acetaminophen (TYLENOL) Suppository 650 mg  650 mg Rectal Q4H PRN Ruben Begum, DO        atorvastatin (LIPITOR) tablet 80 mg  80 mg Oral Daily Ruben Begum, DO   80 mg at 07/22/24 0807    calcium carbonate (TUMS) chewable tablet 1,000 mg  1,000 mg Oral 4x Daily PRN Ruben Begum, DO        lidocaine (LMX4) cream   Topical Q1H PRN Ruben Begum,         lidocaine 1 % 0.1-1 mL  0.1-1 mL Other Q1H PRN Ruben Begum, DO         lisinopril (ZESTRIL) tablet 40 mg  40 mg Oral Daily Ruben Begum DO   40 mg at 07/22/24 0808    melatonin tablet 1 mg  1 mg Oral At Bedtime PRN Ruben Begum DO        metoprolol succinate ER (TOPROL XL) 24 hr tablet 50 mg  50 mg Oral Daily Ruben Begum DO   50 mg at 07/22/24 0808    senna-docusate (SENOKOT-S/PERICOLACE) 8.6-50 MG per tablet 1 tablet  1 tablet Oral BID PRN Ruben Begum DO        Or    senna-docusate (SENOKOT-S/PERICOLACE) 8.6-50 MG per tablet 2 tablet  2 tablet Oral BID PRN Ruben Begum DO        sodium chloride (PF) 0.9% PF flush 3 mL  3 mL Intracatheter Q8H Ruben Begum DO   3 mL at 07/22/24 0321    sodium chloride (PF) 0.9% PF flush 3 mL  3 mL Intracatheter q1 min prn Ruben Begum DO        sodium chloride 0.9% irrigation (bag)   Irrigation Continuous Claudy Dotson MD   3,000 mL at 07/21/24 2234    tolterodine ER (DETROL LA) 24 hr capsule 4 mg  4 mg Oral Daily Damaso Thakur APRN CNP   4 mg at 07/22/24 0807                  Physical Exam:   Vitals were reviewed  Patient Vitals for the past 8 hrs:   BP Temp Temp src Pulse Resp SpO2   07/22/24 0745 121/72 97.9  F (36.6  C) Oral 68 18 96 %     GEN: NAD, sitting up in bed  EYES: EOMI  MOUTH: MMM  NECK: Supple  RESP: Unlabored breathing  SKIN: Warm  NEURO: AAO  URO: 3-way Jansen catheter in place with CBI on a slow rate.  Urine is clear rosita.           Data:     Lab Results   Component Value Date    NTBNPI 500 11/09/2023     Lab Results   Component Value Date    WBC 14.1 (H) 07/22/2024    WBC 10.2 07/21/2024    WBC 8.0 07/20/2024    HGB 12.1 (L) 07/22/2024    HGB 12.5 (L) 07/21/2024    HGB 12.9 (L) 07/20/2024    HCT 36.4 (L) 07/22/2024    HCT 37.9 (L) 07/21/2024    HCT 38 (L) 07/20/2024    MCV 94 07/22/2024    MCV 94 07/21/2024    MCV 94 07/20/2024     07/22/2024     07/21/2024     07/20/2024     Lab Results   Component Value Date    INR 0.88 10/05/2016

## 2024-07-22 NOTE — PLAN OF CARE
"For vital signs and complete assessments, please see documentation flowsheets.     Pertinent assessments: assumed cares from 0700 - noon. Pt. Aox4. VSS, on RA, denies SOB. Tolerating regular diet, denies N/v/c/D. 2PIV SL. Jansen in place, CBI at slow rate this A.M. clamped by urology, output pink to rosita, no clots noted.    Major Shift Events urine output pink to rosita, CBI clamped by urology    Treatment Plan: CBI wean as able, Jansen management, HGB monitor    Problem: Adult Inpatient Plan of Care  Goal: Plan of Care Review  Description: The Plan of Care Review/Shift note should be completed every shift.  The Outcome Evaluation is a brief statement about your assessment that the patient is improving, declining, or no change.  This information will be displayed automatically on your shift  note.  Outcome: Progressing  Flowsheets (Taken 7/22/2024 1200)  Outcome Evaluation: CBI clamped by urology, urine clear rosita. Denies pain at rest/reports spasms with movement, RA denies SOB, denies N/v/c/D. x2PIV SL  Plan of Care Reviewed With: patient  Overall Patient Progress: no change  Goal: Patient-Specific Goal (Individualized)  Description: You can add care plan individualizations to a care plan. Examples of Individualization might be:  \"Parent requests to be called daily at 9am for status\", \"I have a hard time hearing out of my right ear\", or \"Do not touch me to wake me up as it startles  me\".  Outcome: Progressing  Goal: Absence of Hospital-Acquired Illness or Injury  Outcome: Progressing  Intervention: Identify and Manage Fall Risk  Recent Flowsheet Documentation  Taken 7/22/2024 0813 by Angélica Artis, RN  Safety Promotion/Fall Prevention: safety round/check completed  Intervention: Prevent Skin Injury  Recent Flowsheet Documentation  Taken 7/22/2024 0813 by Angélica Artis, RN  Body Position: position changed independently  Intervention: Prevent and Manage VTE (Venous Thromboembolism) Risk  Recent Flowsheet " Documentation  Taken 7/22/2024 0813 by Angélica Artis, RN  VTE Prevention/Management: SCDs off (sequential compression devices)  Intervention: Prevent Infection  Recent Flowsheet Documentation  Taken 7/22/2024 0813 by Angélica Artis, RN  Infection Prevention: hand hygiene promoted  Goal: Optimal Comfort and Wellbeing  Outcome: Progressing  Goal: Readiness for Transition of Care  Outcome: Progressing   Goal Outcome Evaluation:      Plan of Care Reviewed With: patient    Overall Patient Progress: no changeOverall Patient Progress: no change    Outcome Evaluation: CBI clamped by urology, urine clear rosita. Denies pain at rest/reports spasms with movement, RA denies SOB, denies N/v/c/D. x2PIV SL

## 2024-07-23 PROCEDURE — 120N000001 HC R&B MED SURG/OB

## 2024-07-23 PROCEDURE — 99231 SBSQ HOSP IP/OBS SF/LOW 25: CPT | Performed by: PHYSICIAN ASSISTANT

## 2024-07-23 PROCEDURE — 99233 SBSQ HOSP IP/OBS HIGH 50: CPT | Performed by: STUDENT IN AN ORGANIZED HEALTH CARE EDUCATION/TRAINING PROGRAM

## 2024-07-23 PROCEDURE — 250N000013 HC RX MED GY IP 250 OP 250 PS 637: Performed by: STUDENT IN AN ORGANIZED HEALTH CARE EDUCATION/TRAINING PROGRAM

## 2024-07-23 PROCEDURE — 250N000013 HC RX MED GY IP 250 OP 250 PS 637: Performed by: NURSE PRACTITIONER

## 2024-07-23 RX ORDER — CLOPIDOGREL BISULFATE 75 MG/1
75 TABLET ORAL DAILY
Status: DISCONTINUED | OUTPATIENT
Start: 2024-07-23 | End: 2024-07-24

## 2024-07-23 RX ADMIN — TOLTERODINE TARTRATE 4 MG: 4 CAPSULE, EXTENDED RELEASE ORAL at 08:22

## 2024-07-23 RX ADMIN — CLOPIDOGREL BISULFATE 75 MG: 75 TABLET ORAL at 18:02

## 2024-07-23 RX ADMIN — LISINOPRIL 40 MG: 40 TABLET ORAL at 08:22

## 2024-07-23 RX ADMIN — METOPROLOL SUCCINATE 50 MG: 50 TABLET, EXTENDED RELEASE ORAL at 08:22

## 2024-07-23 RX ADMIN — ATORVASTATIN CALCIUM 80 MG: 40 TABLET, FILM COATED ORAL at 08:22

## 2024-07-23 ASSESSMENT — ACTIVITIES OF DAILY LIVING (ADL)
ADLS_ACUITY_SCORE: 22

## 2024-07-23 NOTE — PLAN OF CARE
"Pertinent assessments: A&Ox4. VSS. Denies pain and nausea. Lungs clear, no sob reported. CBI clamped since early this morning at 0330. Urine remains yellow with no clots noted. Tolerating diet. Up ind in room.   Major Shift Events: Plavix resumed  Treatment Plan: Urology following. Monitor for bleeding over night. Keep CBI clamped.  Problem: Adult Inpatient Plan of Care  Goal: Plan of Care Review  Description: The Plan of Care Review/Shift note should be completed every shift.  The Outcome Evaluation is a brief statement about your assessment that the patient is improving, declining, or no change.  This information will be displayed automatically on your shift  note.  Outcome: Progressing  Flowsheets (Taken 7/23/2024 1813)  Outcome Evaluation: CBI clamped. No signs of blood in urine. Possible discharge back home tomorrow.  Overall Patient Progress: improving  Goal: Patient-Specific Goal (Individualized)  Description: You can add care plan individualizations to a care plan. Examples of Individualization might be:  \"Parent requests to be called daily at 9am for status\", \"I have a hard time hearing out of my right ear\", or \"Do not touch me to wake me up as it startles  me\".  Outcome: Progressing  Goal: Absence of Hospital-Acquired Illness or Injury  Outcome: Progressing  Intervention: Identify and Manage Fall Risk  Recent Flowsheet Documentation  Taken 7/23/2024 0854 by Yue Doherty RN  Safety Promotion/Fall Prevention:   assistive device/personal items within reach   safety round/check completed   room organization consistent  Intervention: Prevent Skin Injury  Recent Flowsheet Documentation  Taken 7/23/2024 0854 by Yue Doherty, RN  Body Position: position changed independently  Device Skin Pressure Protection: absorbent pad utilized/changed  Intervention: Prevent and Manage VTE (Venous Thromboembolism) Risk  Recent Flowsheet Documentation  Taken 7/23/2024 0854 by Yue Doherty RN  VTE " Prevention/Management: SCDs off (sequential compression devices)  Intervention: Prevent Infection  Recent Flowsheet Documentation  Taken 7/23/2024 0854 by Yue Doherty RN  Infection Prevention: single patient room provided  Goal: Optimal Comfort and Wellbeing  Outcome: Progressing  Goal: Readiness for Transition of Care  Outcome: Progressing     Problem: Pain Acute  Goal: Optimal Pain Control and Function  Outcome: Progressing  Intervention: Prevent or Manage Pain  Recent Flowsheet Documentation  Taken 7/23/2024 0854 by Yue Doherty RN  Medication Review/Management: medications reviewed     Problem: Comorbidity Management  Goal: Blood Pressure in Desired Range  Outcome: Progressing  Intervention: Maintain Blood Pressure Management  Recent Flowsheet Documentation  Taken 7/23/2024 0854 by Yue Doherty RN  Medication Review/Management: medications reviewed     Problem: Skin Injury Risk Increased  Goal: Skin Health and Integrity  Outcome: Progressing  Intervention: Plan: Nurse Driven Intervention: Moisture Management  Recent Flowsheet Documentation  Taken 7/23/2024 0854 by Yue Doherty RN  Moisture Interventions: Urinary collection device  Intervention: Optimize Skin Protection  Recent Flowsheet Documentation  Taken 7/23/2024 0854 by Yue Doherty RN  Activity Management:   activity adjusted per tolerance   activity encouraged  Head of Bed (HOB) Positioning: HOB at 60 degrees   Goal Outcome Evaluation:           Overall Patient Progress: improvingOverall Patient Progress: improving    Outcome Evaluation: CBI clamped. No signs of blood in urine. Possible discharge back home tomorrow.

## 2024-07-23 NOTE — PLAN OF CARE
"Goal Outcome Evaluation:      Plan of Care Reviewed With: patient    Overall Patient Progress: improving Overall Patient Progress: improving    Outcome Evaluation: CBI remains clamped, urine output clear yellow, no clots. denies pain/nausea    To Do:  End of Shift Summary  For vital signs and complete assessments, please see documentation flowsheets.     Pertinent assessments: Assumed cares from [id number]. VSS except for high BP. Scheduled metoprolol given. On RA. Denies nausea. Denies pain. CBI clamped since 0330. Urine is clear, yellow, no clots. Reports of bladder discomfort when walking- scheduled Detrol administered in AM.     Major Shift Events: uneventful    Treatment Plan: CBI wean as able, Jansen management, monitor Hgb, resume plavix later tonight, monitor urine output/characteristics      Problem: Adult Inpatient Plan of Care  Goal: Plan of Care Review  Description: The Plan of Care Review/Shift note should be completed every shift.  The Outcome Evaluation is a brief statement about your assessment that the patient is improving, declining, or no change.  This information will be displayed automatically on your shift  note.  Outcome: Progressing  Flowsheets (Taken 7/23/2024 1414)  Outcome Evaluation: CBI remains clamped, urine output clear yellow, no clots. denies pain/nausea  Plan of Care Reviewed With: patient  Overall Patient Progress: improving  Goal: Patient-Specific Goal (Individualized)  Description: You can add care plan individualizations to a care plan. Examples of Individualization might be:  \"Parent requests to be called daily at 9am for status\", \"I have a hard time hearing out of my right ear\", or \"Do not touch me to wake me up as it startles  me\".  Outcome: Progressing  Goal: Absence of Hospital-Acquired Illness or Injury  Outcome: Progressing  Intervention: Identify and Manage Fall Risk  Recent Flowsheet Documentation  Taken 7/23/2024 0836 by Elvin Hanson RN  Safety Promotion/Fall " Prevention:   safety round/check completed   patient and family education   nonskid shoes/slippers when out of bed  Intervention: Prevent and Manage VTE (Venous Thromboembolism) Risk  Recent Flowsheet Documentation  Taken 7/23/2024 0832 by Elvin Hanson RN  VTE Prevention/Management: SCDs off (sequential compression devices)  Goal: Optimal Comfort and Wellbeing  Outcome: Progressing  Goal: Readiness for Transition of Care  Outcome: Progressing     Problem: Pain Acute  Goal: Optimal Pain Control and Function  Outcome: Progressing  Intervention: Prevent or Manage Pain  Recent Flowsheet Documentation  Taken 7/23/2024 0832 by Elvin Hanson RN  Medication Review/Management: medications reviewed     Problem: Comorbidity Management  Goal: Blood Pressure in Desired Range  Outcome: Progressing  Intervention: Maintain Blood Pressure Management  Recent Flowsheet Documentation  Taken 7/23/2024 0832 by Elvin Hanson RN  Medication Review/Management: medications reviewed     Problem: Skin Injury Risk Increased  Goal: Skin Health and Integrity  Outcome: Progressing  Intervention: Plan: Nurse Driven Intervention: Moisture Management  Recent Flowsheet Documentation  Taken 7/23/2024 0832 by Elvin Hanson RN  Moisture Interventions: Urinary collection device

## 2024-07-23 NOTE — PROGRESS NOTES
[facility]    Medicine Progress Note - Hospitalist Service    Date of Admission:  7/20/2024    Assessment & Plan   Issac Jackson is a 68-year-old male with a medical history of STEMI, coronary artery disease status post stenting to the RCA and LAD in 2016 (on Plavix and Aspirin), ischemic cardiomyopathy, congestive heart failure, hypertension, hyperlipidemia, and a history of hernia repair who presented to the [facility] emergency department for evaluation of hematuria with clots and lower back pain x1 week.     Hematuria   Unlikely urinary tract infection:  The patient presented with painless hematuria with clots.  He endorses lower back pain x1 week, but no pain with urination.  Vital signs are stable, and there is no leukocytosis (WBC 8).  UA showed trace leukocyte esterase and WBC, but was negative for nitrite and bacteria.  Urine culture negative.  Abdomen/pelvis CT without contrast revealed dependent clot material within the urinary bladder and mild prostatomegaly, with no urinary tract calculi, hydronephrosis, or hydroureter.  Patient received CBI overnight and was taken for cystoscopy on 7/21 which showed small bleeding vessels in the suspected stop of catheter trauma and some at the bladder neck which were fulgurated.   -Urology following - discussed with them today   -CBI currently clamped, ok to stay clamped if urine remains clear   -Continue baig overnight  -If urine remains clear until this evening, will plan to resume PTA ASA & Plavix  -Then urine will need to be monitored overnight to assess for repeat hematuria  -I have a page out to his primary cardiologist to discuss need for DAPT     Remote history of HFrEF, EF now recovered  CAD s/p stenting to the RCA and LAD in 2016  Hypertension   Hyperlipidemia:  TTE from 2016 indicated a normal left ventricular (LV) size with mildly reduced LV systolic function and an ejection fraction (EF) of 45%.  Most recent TTE on 11/2023  "shows normal EF 50-55%.  The patient has a known history of CAD for which his cardiology has recommended long term plavix.    -If urine remains clear until this evening, will plan to resume PTA ASA & Plavix  -Continue PTA Lisinopril, Metoprolol, and Atorvastatin          Diet: Combination Diet Regular Diet Adult    DVT Prophylaxis: Pneumatic Compression Devices  Jansen Catheter: PRESENT, indication: Gross hematuria, Gross hematuria  Lines: None     Cardiac Monitoring: None  Code Status: Full Code      Clinically Significant Risk Factors                               # Overweight: Estimated body mass index is 27.62 kg/m  as calculated from the following:    Height as of this encounter: 1.753 m (5' 9\").    Weight as of this encounter: 84.8 kg (187 lb)., PRESENT ON ADMISSION            Disposition Plan     Medically Ready for Discharge: Anticipated Tomorrow    Ruben Begum DO  Hospitalist Service  [facility]  Securely message with [employer] (more info)  Text page via [employer] Paging/Directory   ______________________________________________________________________    Interval History   NAEO.  The patient feels ok.  Urine is clear.  CBI off since 330 am.  He has not had success in getting a hold of his cardiology team to discuss asa and plavix.     Physical Exam   Vital Signs: Temp: 98.1  F (36.7  C) Temp src: Oral BP: (!) 141/67 Pulse: 78   Resp: 18 SpO2: 94 % O2 Device: None (Room air)    Weight: 187 lbs 0 oz    General Appearance: Patient awake & alert.  No apparent distress.   Respiratory: Lungs are CTAB.  Work of breathing appears normal on room air.  Cardiovascular: Regular rate and rhythm.  No murmurs rubs or gallops.  There is no edema present.  GI: Benign.  Soft.  Non-tender.  Bowel sounds active.   Skin: No rashes or lesions exposed skin.  Neuro:  The patient is moving all extremities.  No obvious focal asymmetries.   Other: Patient is appropriate..  Jansen in place with yellow " urine.    Medical Decision Making       50 MINUTES SPENT BY ME on the date of service doing chart review, history, exam, documentation & further activities per the note.      Data   ------------------------- PAST 24 HR DATA REVIEWED -----------------------------------------------        Imaging results reviewed over the past 24 hrs:   No results found for this or any previous visit (from the past 24 hour(s)).

## 2024-07-23 NOTE — PLAN OF CARE
Denies pain. Reports some bladder spasms with activity, okay when at rest. CBI running at slow rate, good urine output. Urine clear with no clots. Pt reported something in his right eye which appeared red; eye flushed with normal saline and irritation somewhat relieved.

## 2024-07-23 NOTE — PROGRESS NOTES
[facility] Urology Progress Note          Assessment and Plan:     Assessment:    POD 2 Cystoscopy with fulguration of hemorrhaging blood vessel and thrombus removal    Gross hematuria    CAD      Plan:   -Continue with 3-way Jansen catheter with CBI.   If it remains clear, okay to keep clamped and see if urine remains clears.  If so, can plug irrigation port.  -Hand irrigate Jansen catheter, as needed, for bladder spasms, clots, or decreased output.  -Continue Detrol 4 mg for bladder spasms.  -Continue to monitor hemoglobin.  -Can cautiously restart blood thinner later today and monitor for recurrence of gross hematuria.  -If still clear tomorrow morning, likely Jansen catheter removal with trial of void and possible discharge home.  -Will continue to follow along.    Patience Bethea PA-C   [facility]  [phone]               Interval History:     Doing okay.  Some mild discomfort at catheter/meatus.  Having bladder spasms with leakage around catheter at times.  3-way Jansen catheter in place with CBI clamped with clear rosita urine.  Patient denies any lightheaded or dizziness Denies N/V/F/C/SOB/CP.  CBI briefly had to be restarted yesterday due to hematuria, but has been clamped since 0330.  WBC 14.1 (10.2).  Hemoglobin 12.1 (12.5 (13.4)).              Review of Systems:     The 5 point Review of Systems is negative other than noted in the HPI             Medications:     Current Facility-Administered Medications   Medication Dose Route Frequency Provider Last Rate Last Admin    acetaminophen (TYLENOL) tablet 650 mg  650 mg Oral Q4H PRN Ruben Begum DO   650 mg at 07/21/24 1446    Or    acetaminophen (TYLENOL) Suppository 650 mg  650 mg Rectal Q4H PRN Ruben Begum DO        atorvastatin (LIPITOR) tablet 80 mg  80 mg Oral Daily Ruben Begum DO   80 mg at 07/23/24 0822    calcium carbonate (TUMS) chewable tablet 1,000 mg  1,000 mg Oral 4x Daily PRN Ruben Begum, DO         lidocaine (LMX4) cream   Topical Q1H PRN Ruben Begum DO        lidocaine 1 % 0.1-1 mL  0.1-1 mL Other Q1H PRN Ruben Begum, DO        lisinopril (ZESTRIL) tablet 40 mg  40 mg Oral Daily Ruben Begum, DO   40 mg at 07/23/24 0822    melatonin tablet 1 mg  1 mg Oral At Bedtime PRN Ruben Begum, DO        metoprolol succinate ER (TOPROL XL) 24 hr tablet 50 mg  50 mg Oral Daily Ruben Begum, DO   50 mg at 07/23/24 0822    senna-docusate (SENOKOT-S/PERICOLACE) 8.6-50 MG per tablet 1 tablet  1 tablet Oral BID PRN Ruben Begum DO        Or    senna-docusate (SENOKOT-S/PERICOLACE) 8.6-50 MG per tablet 2 tablet  2 tablet Oral BID PRN Ruben Begum, DO        sodium chloride (PF) 0.9% PF flush 3 mL  3 mL Intracatheter Q8H Ruben Begum DO   3 mL at 07/23/24 0826    sodium chloride (PF) 0.9% PF flush 3 mL  3 mL Intracatheter q1 min prn Ruben Begum DO        sodium chloride 0.9% irrigation (bag)   Irrigation Continuous Claudy Dotson MD   3,000 mL at 07/22/24 1942    tolterodine ER (DETROL LA) 24 hr capsule 4 mg  4 mg Oral Daily Damaso Thakur APRN CNP   4 mg at 07/23/24 0822                  Physical Exam:   Vitals were reviewed  Patient Vitals for the past 8 hrs:   BP Temp Temp src Pulse Resp SpO2   07/23/24 0638 (!) 141/67 98.1  F (36.7  C) Oral 78 18 94 %     GEN: NAD, sitting up in bed  EYES: EOMI  MOUTH: MMM  NECK: Supple  RESP: Unlabored breathing  SKIN: Warm  NEURO: AAO  URO: 3-way Jansen catheter in place with CBI on a slow rate.  Urine is clear rosita.           Data:     Lab Results   Component Value Date    NTBNPI 500 11/09/2023     Lab Results   Component Value Date    WBC 14.1 (H) 07/22/2024    WBC 10.2 07/21/2024    WBC 8.0 07/20/2024    HGB 12.1 (L) 07/22/2024    HGB 12.5 (L) 07/21/2024    HGB 12.9 (L) 07/20/2024    HCT 36.4 (L) 07/22/2024    HCT 37.9 (L) 07/21/2024    HCT 38 (L) 07/20/2024    MCV 94 07/22/2024    MCV 94 07/21/2024    MCV 94 07/20/2024      07/22/2024     07/21/2024     07/20/2024     Lab Results   Component Value Date    INR 0.88 10/05/2016      All cultures:  Recent Labs   Lab 07/20/24  0746   CULTURE No Growth

## 2024-07-23 NOTE — PLAN OF CARE
"End of Shift Summary  For vital signs and complete assessments, please see documentation flowsheets.     Pertinent assessments: VSS, on RA. CBI was at slow rate until 0330, then clamped.  At 0530, 250 ml of output, yellow with no clots noted. Denies pain. Pt reported having had some right eye irration last evening. His eye is a little red, but patient states it feels improved.     Major Shift Events: clamped CBI at 0330.     Treatment Plan: CBI wean as able, Jansen management, HGB monitor    Plan of Care Reviewed With: patient    Overall Patient Progress: improving    Outcome Evaluation: CBI clamped at 0330. Urine output is yellow, no clots noted.      Problem: Adult Inpatient Plan of Care  Goal: Plan of Care Review  Description: The Plan of Care Review/Shift note should be completed every shift.  The Outcome Evaluation is a brief statement about your assessment that the patient is improving, declining, or no change.  This information will be displayed automatically on your shift  note.  Outcome: Progressing  Flowsheets (Taken 7/23/2024 0603)  Outcome Evaluation: CBI clamped at 0330. Urine output is yellow, no clots noted.  Plan of Care Reviewed With: patient  Overall Patient Progress: improving  Goal: Patient-Specific Goal (Individualized)  Description: You can add care plan individualizations to a care plan. Examples of Individualization might be:  \"Parent requests to be called daily at 9am for status\", \"I have a hard time hearing out of my right ear\", or \"Do not touch me to wake me up as it startles  me\".  Outcome: Progressing  Goal: Absence of Hospital-Acquired Illness or Injury  Outcome: Progressing  Intervention: Identify and Manage Fall Risk  Recent Flowsheet Documentation  Taken 7/23/2024 0000 by Gin Ross, RN  Safety Promotion/Fall Prevention: safety round/check completed  Intervention: Prevent Skin Injury  Recent Flowsheet Documentation  Taken 7/23/2024 0000 by Gin Ross, RN  Body Position: position " changed independently  Goal: Optimal Comfort and Wellbeing  Outcome: Progressing  Goal: Readiness for Transition of Care  Outcome: Progressing     Problem: Pain Acute  Goal: Optimal Pain Control and Function  Outcome: Progressing  Intervention: Prevent or Manage Pain  Recent Flowsheet Documentation  Taken 7/23/2024 0000 by Gin Ross RN  Medication Review/Management: medications reviewed     Problem: Comorbidity Management  Goal: Blood Pressure in Desired Range  Outcome: Progressing  Intervention: Maintain Blood Pressure Management  Recent Flowsheet Documentation  Taken 7/23/2024 0000 by Gin Ross RN  Medication Review/Management: medications reviewed     Problem: Skin Injury Risk Increased  Goal: Skin Health and Integrity  Outcome: Progressing  Intervention: Optimize Skin Protection  Recent Flowsheet Documentation  Taken 7/23/2024 0000 by Gin oRss RN  Activity Management: up ad anna

## 2024-07-24 PROCEDURE — 250N000013 HC RX MED GY IP 250 OP 250 PS 637: Performed by: NURSE PRACTITIONER

## 2024-07-24 PROCEDURE — 258N000001 HC RX 258

## 2024-07-24 PROCEDURE — 99232 SBSQ HOSP IP/OBS MODERATE 35: CPT | Performed by: STUDENT IN AN ORGANIZED HEALTH CARE EDUCATION/TRAINING PROGRAM

## 2024-07-24 PROCEDURE — 250N000013 HC RX MED GY IP 250 OP 250 PS 637: Performed by: STUDENT IN AN ORGANIZED HEALTH CARE EDUCATION/TRAINING PROGRAM

## 2024-07-24 PROCEDURE — 120N000001 HC R&B MED SURG/OB

## 2024-07-24 PROCEDURE — 99231 SBSQ HOSP IP/OBS SF/LOW 25: CPT | Performed by: UROLOGY

## 2024-07-24 RX ADMIN — SODIUM CHLORIDE 3000 ML: 900 IRRIGANT IRRIGATION at 05:17

## 2024-07-24 RX ADMIN — METOPROLOL SUCCINATE 50 MG: 50 TABLET, EXTENDED RELEASE ORAL at 08:23

## 2024-07-24 RX ADMIN — TOLTERODINE TARTRATE 4 MG: 4 CAPSULE, EXTENDED RELEASE ORAL at 08:23

## 2024-07-24 RX ADMIN — ATORVASTATIN CALCIUM 80 MG: 40 TABLET, FILM COATED ORAL at 08:23

## 2024-07-24 RX ADMIN — LISINOPRIL 40 MG: 40 TABLET ORAL at 08:23

## 2024-07-24 ASSESSMENT — ACTIVITIES OF DAILY LIVING (ADL)
ADLS_ACUITY_SCORE: 22

## 2024-07-24 NOTE — PROGRESS NOTES
[facility] Urology Progress Note          Assessment and Plan:     Assessment:    POD 3 Cystoscopy with fulguration of hemorrhaging blood vessel and thrombus removal    Gross hematuria    CAD      Plan:   -Continue with 3-way Jansen catheter with CBI.   If it remains clear, okay to keep clamped and see if urine remains clears.  If so, can plug irrigation port.  -Hand irrigate Jansen catheter, as needed, for bladder spasms, clots, or decreased output.  -Continue Detrol 4 mg for bladder spasms.  -Continue to monitor hemoglobin.  -Hematuria restarted with restarting Plavix.  Discussed with patient and medicine.  Plan to hold with close outpatient follow up with cardiology.  -Discussed with Dr. Alexander. No need for follow up cystoscopy at this time and plan to hold off on finasteride.  -If urine stays clear, plan for TOV in the am and possible discharge.  -Will continue to follow along.    Patience Bethea PA-C   [facility]  [phone]               Interval History:     Doing okay.   Having bladder spasms with leakage around catheter at times.  3-way Jansen catheter in place with CBI clamped with clear pink urine.  Patient denies any lightheaded or dizziness. Denies N/V/F/C/SOB/CP.  Patient was clamped most of the day yesterday.  Gross hematuria started overnight and required CBI for approximately 2 and half hours.  CBI again clamped.  Urine is again clear red with CBI being started approximately 5 minutes before I saw the patient.  Plavix was restarted last night, but is now being held.              Review of Systems:     The 5 point Review of Systems is negative other than noted in the HPI             Medications:     Current Facility-Administered Medications   Medication Dose Route Frequency Provider Last Rate Last Admin    acetaminophen (TYLENOL) tablet 650 mg  650 mg Oral Q4H PRN Ruben Begum DO   650 mg at 07/21/24 1446    Or    acetaminophen (TYLENOL) Suppository 650 mg  650 mg Rectal  Q4H PRN Ruben Begum DO        atorvastatin (LIPITOR) tablet 80 mg  80 mg Oral Daily Ruben Begum DO   80 mg at 07/24/24 0823    calcium carbonate (TUMS) chewable tablet 1,000 mg  1,000 mg Oral 4x Daily PRN Ruben Begum DO        lidocaine (LMX4) cream   Topical Q1H PRN Ruben Begum DO        lidocaine 1 % 0.1-1 mL  0.1-1 mL Other Q1H PRN Ruben Begum DO        lisinopril (ZESTRIL) tablet 40 mg  40 mg Oral Daily Ruben Begum, DO   40 mg at 07/24/24 0823    melatonin tablet 1 mg  1 mg Oral At Bedtime PRN Ruben Begum DO        metoprolol succinate ER (TOPROL XL) 24 hr tablet 50 mg  50 mg Oral Daily Ruben Begum DO   50 mg at 07/24/24 0823    senna-docusate (SENOKOT-S/PERICOLACE) 8.6-50 MG per tablet 1 tablet  1 tablet Oral BID PRN Ruben Begum DO        Or    senna-docusate (SENOKOT-S/PERICOLACE) 8.6-50 MG per tablet 2 tablet  2 tablet Oral BID PRN Ruben Begum DO        sodium chloride (PF) 0.9% PF flush 3 mL  3 mL Intracatheter Q8H Ruben Begum DO   3 mL at 07/24/24 0825    sodium chloride (PF) 0.9% PF flush 3 mL  3 mL Intracatheter q1 min prn Ruben Begum DO        sodium chloride 0.9% irrigation (bag)   Irrigation Continuous Claudy Dotson MD   3,000 mL at 07/24/24 0517    tolterodine ER (DETROL LA) 24 hr capsule 4 mg  4 mg Oral Daily Damaso Thakur APRN CNP   4 mg at 07/24/24 0823                  Physical Exam:   Vitals were reviewed  Patient Vitals for the past 8 hrs:   BP Temp Temp src Pulse Resp SpO2   07/24/24 0827 (!) 163/98 97.8  F (36.6  C) Oral 78 20 99 %     GEN: NAD, sitting up in bed  EYES: EOMI  MOUTH: MMM  NECK: Supple  RESP: Unlabored breathing  SKIN: Warm  NEURO: AAO  URO: 3-way Jansen catheter in place with CBI on a mod-slow rate.  Urine is clear reddish pink.           Data:     Lab Results   Component Value Date    NTBNPI 500 11/09/2023     Lab Results   Component Value Date    WBC 14.1 (H) 07/22/2024    WBC 10.2 07/21/2024    WBC 8.0  07/20/2024    HGB 12.1 (L) 07/22/2024    HGB 12.5 (L) 07/21/2024    HGB 12.9 (L) 07/20/2024    HCT 36.4 (L) 07/22/2024    HCT 37.9 (L) 07/21/2024    HCT 38 (L) 07/20/2024    MCV 94 07/22/2024    MCV 94 07/21/2024    MCV 94 07/20/2024     07/22/2024     07/21/2024     07/20/2024     Lab Results   Component Value Date    INR 0.88 10/05/2016      All cultures:  Recent Labs   Lab 07/20/24  0746   CULTURE No Growth

## 2024-07-24 NOTE — PLAN OF CARE
"Goal Outcome Evaluation:      Plan of Care Reviewed With: patient    Overall Patient Progress: improving Overall Patient Progress: improving    Outcome Evaluation: CBI unclamped due to bloody urine output. Weaned to slow rate with clear light pinkish output. Denies pain.    End of Shift Summary  For vital signs and complete assessments, please see documentation flowsheets.     Pertinent assessments: A&Ox4. VSS except for high BP. Gave scheduled metoprolol in AM. On RA. LS clear. BS active. Denies pain. Denies N/V. CBI unclamped at 0945 due to bloody urine output. Weaned CBI to slow rate with clear light pinkish urine output. Up independently in room. PIV SL.    Major Shift Events: CBI unclamped at 0945, plavix discontinued per MD    Treatment Plan: monitor urine output, wean CBI as able    Bedside Nurse: Elvin Hanson RN      Problem: Adult Inpatient Plan of Care  Goal: Plan of Care Review  Description: The Plan of Care Review/Shift note should be completed every shift.  The Outcome Evaluation is a brief statement about your assessment that the patient is improving, declining, or no change.  This information will be displayed automatically on your shift  note.  Outcome: Progressing  Flowsheets (Taken 7/24/2024 8714)  Outcome Evaluation: CBI unclamped due to bloody urine output. Weaned to slow rate with clear light pinkish output. Denies pain.  Plan of Care Reviewed With: patient  Overall Patient Progress: improving  Goal: Patient-Specific Goal (Individualized)  Description: You can add care plan individualizations to a care plan. Examples of Individualization might be:  \"Parent requests to be called daily at 9am for status\", \"I have a hard time hearing out of my right ear\", or \"Do not touch me to wake me up as it startles  me\".  Outcome: Progressing  Goal: Absence of Hospital-Acquired Illness or Injury  Outcome: Progressing  Intervention: Identify and Manage Fall Risk  Recent Flowsheet Documentation  Taken " 7/24/2024 0835 by Elvin Hanson RN  Safety Promotion/Fall Prevention:   clutter free environment maintained   nonskid shoes/slippers when out of bed   patient and family education   room near nurse's station   safety round/check completed  Intervention: Prevent and Manage VTE (Venous Thromboembolism) Risk  Recent Flowsheet Documentation  Taken 7/24/2024 0835 by Elvin Hanson RN  VTE Prevention/Management: SCDs off (sequential compression devices)  Goal: Optimal Comfort and Wellbeing  Outcome: Progressing  Goal: Readiness for Transition of Care  Outcome: Progressing     Problem: Pain Acute  Goal: Optimal Pain Control and Function  Outcome: Progressing  Intervention: Prevent or Manage Pain  Recent Flowsheet Documentation  Taken 7/24/2024 0835 by Elvin Hanson RN  Medication Review/Management: medications reviewed     Problem: Comorbidity Management  Goal: Blood Pressure in Desired Range  Outcome: Progressing  Intervention: Maintain Blood Pressure Management  Recent Flowsheet Documentation  Taken 7/24/2024 0835 by Elvin Hanson RN  Medication Review/Management: medications reviewed     Problem: Skin Injury Risk Increased  Goal: Skin Health and Integrity  Outcome: Progressing  Intervention: Plan: Nurse Driven Intervention: Moisture Management  Recent Flowsheet Documentation  Taken 7/24/2024 0835 by Elvin Hanson RN  Moisture Interventions: Incontinence pad  Intervention: Optimize Skin Protection  Recent Flowsheet Documentation  Taken 7/24/2024 0835 by Elvin Hanson RN  Activity Management: up ad anna

## 2024-07-24 NOTE — PLAN OF CARE
"Pertinent assessments: A&Ox4. VSS. Denies pain and nausea. No SOB reported. CBI running at slow rate throughout the day for bloody urine. Clamped at 4pm urine remains rosita/brown. Tolerating diet. Up ind in room.   Major Shift Events: bloody urine on and off throughout the day. Plavix discontinued.   Treatment Plan: Urology following. Wean CBI as able. Monitor for further bleeding. Possible discharge 7/25.  Problem: Adult Inpatient Plan of Care  Goal: Plan of Care Review  Description: The Plan of Care Review/Shift note should be completed every shift.  The Outcome Evaluation is a brief statement about your assessment that the patient is improving, declining, or no change.  This information will be displayed automatically on your shift  note.  Outcome: Progressing  Flowsheets (Taken 7/24/2024 1852)  Outcome Evaluation: CBI clamped. Urine remains dark brown. No cramping or spasms reported.  Overall Patient Progress: improving  Goal: Patient-Specific Goal (Individualized)  Description: You can add care plan individualizations to a care plan. Examples of Individualization might be:  \"Parent requests to be called daily at 9am for status\", \"I have a hard time hearing out of my right ear\", or \"Do not touch me to wake me up as it startles  me\".  Outcome: Progressing  Goal: Absence of Hospital-Acquired Illness or Injury  Outcome: Progressing  Intervention: Identify and Manage Fall Risk  Recent Flowsheet Documentation  Taken 7/24/2024 0830 by Yue Doherty RN  Safety Promotion/Fall Prevention:   assistive device/personal items within reach   safety round/check completed  Intervention: Prevent Skin Injury  Recent Flowsheet Documentation  Taken 7/24/2024 0830 by Yue Doherty RN  Body Position: position changed independently  Device Skin Pressure Protection: absorbent pad utilized/changed  Intervention: Prevent and Manage VTE (Venous Thromboembolism) Risk  Recent Flowsheet Documentation  Taken 7/24/2024 0830 by " McErlean, Yue, RN  VTE Prevention/Management: SCDs off (sequential compression devices)  Intervention: Prevent Infection  Recent Flowsheet Documentation  Taken 7/24/2024 0830 by Yue Doherty RN  Infection Prevention:   single patient room provided   equipment surfaces disinfected   hand hygiene promoted   rest/sleep promoted  Goal: Optimal Comfort and Wellbeing  Outcome: Progressing  Goal: Readiness for Transition of Care  Outcome: Progressing     Problem: Pain Acute  Goal: Optimal Pain Control and Function  Outcome: Progressing  Intervention: Prevent or Manage Pain  Recent Flowsheet Documentation  Taken 7/24/2024 0830 by Yue Doherty RN  Bowel Elimination Promotion:   adequate fluid intake promoted   ambulation promoted  Medication Review/Management: medications reviewed     Problem: Comorbidity Management  Goal: Blood Pressure in Desired Range  Outcome: Progressing  Intervention: Maintain Blood Pressure Management  Recent Flowsheet Documentation  Taken 7/24/2024 0830 by Yue Doherty RN  Medication Review/Management: medications reviewed     Problem: Skin Injury Risk Increased  Goal: Skin Health and Integrity  Outcome: Progressing  Intervention: Plan: Nurse Driven Intervention: Moisture Management  Recent Flowsheet Documentation  Taken 7/24/2024 0830 by Yue Doherty RN  Moisture Interventions:   Encourage regular toileting   No brief in bed  Intervention: Optimize Skin Protection  Recent Flowsheet Documentation  Taken 7/24/2024 0830 by Yue Doherty RN  Activity Management: up ad anna  Head of Bed (HOB) Positioning: HOB at 30-45 degrees   Goal Outcome Evaluation:           Overall Patient Progress: improvingOverall Patient Progress: improving    Outcome Evaluation: CBI clamped. Urine remains dark brown. No cramping or spasms reported.

## 2024-07-24 NOTE — PROGRESS NOTES
[facility]    Medicine Progress Note - Hospitalist Service    Date of Admission:  7/20/2024    Assessment & Plan   Issac Jackson is a 68-year-old male with a medical history of STEMI, coronary artery disease status post stenting to the RCA and LAD in 2016 (on Plavix and Aspirin), ischemic cardiomyopathy, congestive heart failure, hypertension, hyperlipidemia, and a history of hernia repair who presented to the [facility] emergency department for evaluation of hematuria with clots and lower back pain x1 week.     Hematuria   Unlikely urinary tract infection:  The patient presented with painless hematuria with clots.  He endorses lower back pain x1 week, but no pain with urination.  Vital signs are stable, and there is no leukocytosis (WBC 8).  UA showed trace leukocyte esterase and WBC, but was negative for nitrite and bacteria.  Urine culture negative.  Abdomen/pelvis CT without contrast revealed dependent clot material within the urinary bladder and mild prostatomegaly, with no urinary tract calculi, hydronephrosis, or hydroureter.  Patient received CBI overnight and was taken for cystoscopy on 7/21 which showed small bleeding vessels in the suspected stop of catheter trauma and some at the bladder neck which were fulgurated.   -Urology following - discussed with them today  -Recurrent hematuria after reinitiating of plavix   -Continue CBI & baig, defer cares to urology  -His primary cardiologist recommended discontinuation of asa and continuation of plavix.  However, the patient had immediate return of hematuria following 1 dose of plavix.  Therefore, given quite remote history of CAD & PCI will hold both.  I have recommended close follow up with primary cardiologist.      Remote history of HFrEF, EF now recovered  CAD s/p stenting to the RCA and LAD in 2016  Hypertension   Hyperlipidemia:  TTE from 2016 indicated a normal left ventricular (LV) size with mildly reduced LV systolic  "function and an ejection fraction (EF) of 45%.  Most recent TTE on 11/2023 shows normal EF 50-55%.  The patient has a known history of CAD for which his cardiology has recommended long term plavix.    -If urine remains clear until this evening, will plan to resume PTA ASA & Plavix  -Continue PTA Lisinopril, Metoprolol, and Atorvastatin          Diet: Combination Diet Regular Diet Adult    DVT Prophylaxis: Pneumatic Compression Devices  Jansen Catheter: PRESENT, indication: Gross hematuria, Gross hematuria  Lines: None     Cardiac Monitoring: None  Code Status: Full Code      Clinically Significant Risk Factors                               # Overweight: Estimated body mass index is 27.62 kg/m  as calculated from the following:    Height as of this encounter: 1.753 m (5' 9\").    Weight as of this encounter: 84.8 kg (187 lb).             Disposition Plan     Medically Ready for Discharge: Anticipated Tomorrow    Ruben Begum DO  Hospitalist Service  [facility]  Securely message with [employer] (more info)  Text page via [employer] Paging/Directory   ______________________________________________________________________    Interval History   Recurrent hematuria last night after restarting plavix.  Hematuria stopped again last night but again has returned.  We discussed plavix - patient is agreeable to hold this for now and would strongly prefer this.    Physical Exam   Vital Signs: Temp: 97.8  F (36.6  C) Temp src: Oral BP: (!) 163/98 Pulse: 78   Resp: 20 SpO2: 99 % O2 Device: None (Room air)    Weight: 187 lbs 0 oz    General Appearance: Patient awake & alert.  No apparent distress.   Respiratory: Lungs are CTAB.  Work of breathing appears normal on room air.  Cardiovascular: Regular rate and rhythm.  No murmurs rubs or gallops.  There is no edema present.  GI: Benign.  Soft.  Non-tender.  Bowel sounds active.   Skin: No rashes or lesions exposed skin.  Neuro:  The patient is moving all extremities.  " No obvious focal asymmetries.   Other: Patient is appropriate..  Jansen in place with red urine.    Medical Decision Making       50 MINUTES SPENT BY ME on the date of service doing chart review, history, exam, documentation & further activities per the note.      Data   ------------------------- PAST 24 HR DATA REVIEWED -----------------------------------------------        Imaging results reviewed over the past 24 hrs:   No results found for this or any previous visit (from the past 24 hour(s)).

## 2024-07-24 NOTE — PLAN OF CARE
"Goal Outcome Evaluation:      Plan of Care Reviewed With: patient    Overall Patient Progress: improving Overall Patient Progress: improving    Outcome Evaluation: CBI unclamped for blood in urine. Now clear yellow urine, back to clamped. Denies pain.    To Do:  End of Shift Summary  For vital signs and complete assessments, please see documentation flowsheets.     Pertinent assessments: A&Ox4. VSS on RA. Denies pain and nausea. Unclamped CBI at 0300 for bloody urine. Now output is clear, so CBI clamped again at 0545 - see flowsheet for intake/output. Tolerating diet. LS clear, BS active. Denies SOB. Up ind in room. Slept between cares.     Major Shift Events: Restarted CBI    Treatment Plan: Urology following. CBI clamping trial    Bedside Nurse: Darrel Castillo RN          Problem: Adult Inpatient Plan of Care  Goal: Plan of Care Review  Description: The Plan of Care Review/Shift note should be completed every shift.  The Outcome Evaluation is a brief statement about your assessment that the patient is improving, declining, or no change.  This information will be displayed automatically on your shift  note.  Outcome: Progressing  Flowsheets (Taken 7/24/2024 0653)  Outcome Evaluation: CBI unclamped for blood in urine. Now clear yellow urine, back to clamped. Denies pain.  Plan of Care Reviewed With: patient  Overall Patient Progress: improving  Goal: Patient-Specific Goal (Individualized)  Description: You can add care plan individualizations to a care plan. Examples of Individualization might be:  \"Parent requests to be called daily at 9am for status\", \"I have a hard time hearing out of my right ear\", or \"Do not touch me to wake me up as it startles  me\".  Outcome: Progressing  Goal: Absence of Hospital-Acquired Illness or Injury  Outcome: Progressing  Intervention: Identify and Manage Fall Risk  Recent Flowsheet Documentation  Taken 7/23/2024 2100 by Darrel Castillo, RN  Safety Promotion/Fall Prevention:   " assistive device/personal items within reach   safety round/check completed   room organization consistent   clutter free environment maintained   increased rounding and observation   increase visualization of patient   nonskid shoes/slippers when out of bed   room near nurse's station  Intervention: Prevent Skin Injury  Recent Flowsheet Documentation  Taken 7/24/2024 0500 by Darrel Castillo RN  Body Position: position changed independently  Taken 7/23/2024 2100 by Darrel Castillo RN  Body Position: position changed independently  Intervention: Prevent and Manage VTE (Venous Thromboembolism) Risk  Recent Flowsheet Documentation  Taken 7/23/2024 2100 by Darrel Castillo RN  VTE Prevention/Management: SCDs off (sequential compression devices)  Intervention: Prevent Infection  Recent Flowsheet Documentation  Taken 7/23/2024 2100 by Darrel Castillo RN  Infection Prevention:   single patient room provided   equipment surfaces disinfected   hand hygiene promoted   rest/sleep promoted  Goal: Optimal Comfort and Wellbeing  Outcome: Progressing  Goal: Readiness for Transition of Care  Outcome: Progressing     Problem: Pain Acute  Goal: Optimal Pain Control and Function  Outcome: Progressing  Intervention: Prevent or Manage Pain  Recent Flowsheet Documentation  Taken 7/23/2024 2100 by Darrel Castillo RN  Medication Review/Management: medications reviewed     Problem: Skin Injury Risk Increased  Goal: Skin Health and Integrity  Outcome: Progressing  Intervention: Plan: Nurse Driven Intervention: Moisture Management  Recent Flowsheet Documentation  Taken 7/23/2024 2100 by Darrel Castillo RN  Moisture Interventions:   Encourage regular toileting   Urinary collection device  Intervention: Optimize Skin Protection  Recent Flowsheet Documentation  Taken 7/24/2024 0500 by Darrel Castillo RN  Activity Management: up ad anna  Head of Bed (HOB) Positioning: HOB at 30-45 degrees  Taken 7/24/2024 0400 by Darrel Castillo RN  Activity  Management: ambulated to bathroom  Taken 7/23/2024 2300 by Darrel Castillo, RN  Activity Management: up ad anna  Taken 7/23/2024 2100 by Darrel Castillo, RN  Activity Management:   activity adjusted per tolerance   activity encouraged  Head of Bed (HOB) Positioning: HOB at 30-45 degrees

## 2024-07-25 VITALS
DIASTOLIC BLOOD PRESSURE: 74 MMHG | SYSTOLIC BLOOD PRESSURE: 138 MMHG | TEMPERATURE: 98.1 F | HEART RATE: 64 BPM | HEIGHT: 69 IN | OXYGEN SATURATION: 95 % | BODY MASS INDEX: 27.7 KG/M2 | WEIGHT: 187 LBS | RESPIRATION RATE: 18 BRPM

## 2024-07-25 PROCEDURE — 250N000013 HC RX MED GY IP 250 OP 250 PS 637: Performed by: STUDENT IN AN ORGANIZED HEALTH CARE EDUCATION/TRAINING PROGRAM

## 2024-07-25 PROCEDURE — 99239 HOSP IP/OBS DSCHRG MGMT >30: CPT | Performed by: STUDENT IN AN ORGANIZED HEALTH CARE EDUCATION/TRAINING PROGRAM

## 2024-07-25 PROCEDURE — 99231 SBSQ HOSP IP/OBS SF/LOW 25: CPT | Performed by: PHYSICIAN ASSISTANT

## 2024-07-25 RX ADMIN — ATORVASTATIN CALCIUM 80 MG: 40 TABLET, FILM COATED ORAL at 09:14

## 2024-07-25 RX ADMIN — LISINOPRIL 40 MG: 40 TABLET ORAL at 09:14

## 2024-07-25 RX ADMIN — METOPROLOL SUCCINATE 50 MG: 50 TABLET, EXTENDED RELEASE ORAL at 09:14

## 2024-07-25 ASSESSMENT — ACTIVITIES OF DAILY LIVING (ADL)
ADLS_ACUITY_SCORE: 22

## 2024-07-25 NOTE — PLAN OF CARE
Discharge Note    Patient discharged to home via private vehicle  accompanied by significant other .  IV: Discontinued  Prescriptions N/A.   Belongings reviewed and sent with patient.   Home medications returned to patient: NA  Equipment sent with: patient, N/A.   patient verbalizes understanding of discharge instructions. AVS given to patient.  Additional education completed?  Patient educated to return to ED if clots get worse or has difficulty with urination.

## 2024-07-25 NOTE — DISCHARGE SUMMARY
[facility]  Hospitalist Discharge Summary      Date of Admission:  7/20/2024  Date of Discharge:  7/25/2024 11:48 AM  Discharging Provider: Ruben Begum DO  Discharge Service: Hospitalist Service    Discharge Diagnoses   Issac Jackson is a 68-year-old male with a medical history of STEMI, coronary artery disease status post stenting to the RCA and LAD in 2016 (on Plavix and Aspirin), ischemic cardiomyopathy, congestive heart failure, hypertension, hyperlipidemia, and a history of hernia repair who presented to the [facility] emergency department for evaluation of hematuria with clots and lower back pain x1 week.    Patient received CBI overnight and was taken for cystoscopy on 7/21 which showed small bleeding vessels in the suspected stop of catheter trauma and some at the bladder neck which were fulgurated.     His urine ultimately cleared and he was restarted on plavix.  Unfortunately, hematuria returned after plavix dosing so plavix was discontinued.  His urine was clear and he was voiding appropriately without baig prior to discharge.        Hematuria   Unlikely urinary tract infection:  The patient presented with painless hematuria with clots.  He endorses lower back pain x1 week, but no pain with urination.  Vital signs are stable, and there is no leukocytosis (WBC 8).  UA showed trace leukocyte esterase and WBC, but was negative for nitrite and bacteria.  Urine culture negative.  Abdomen/pelvis CT without contrast revealed dependent clot material within the urinary bladder and mild prostatomegaly, with no urinary tract calculi, hydronephrosis, or hydroureter.  Patient received CBI overnight and was taken for cystoscopy on 7/21 which showed small bleeding vessels in the suspected stop of catheter trauma and some at the bladder neck which were fulgurated.  Urine cleared with CBI.  Plavix restarted --> recurrent hematuria --> plavix held --> urine cleared.  Baig removed and  "voiding appropriately without retention prior to discharge.  He will HOLD aspirin and plavix on discharge given hematuria with antiplatelet therapy.  He will follow closely with his primary cardiologist.      Remote history of HFrEF, EF now recovered  CAD s/p stenting to the RCA and LAD in 2016  Hypertension   Hyperlipidemia:  TTE from 2016 indicated a normal left ventricular (LV) size with mildly reduced LV systolic function and an ejection fraction (EF) of 45%.  Most recent TTE on 11/2023 shows normal EF 50-55%.  The patient has a known history of CAD for which his cardiology has recommended long term plavix.    -Holding aspirin and plavix on discharge given recurrent hematuria after plavix was restarted  -Close follow-up with primary cardiologist  -Continue PTA Lisinopril, Metoprolol, and Atorvastatin    Clinically Significant Risk Factors     # Overweight: Estimated body mass index is 27.62 kg/m  as calculated from the following:    Height as of this encounter: 1.753 m (5' 9\").    Weight as of this encounter: 84.8 kg (187 lb).       Follow-ups Needed After Discharge   Follow-up Appointments     Follow-up and recommended labs and tests       Follow up with primary care provider, [facility],   within 7 days for hospital follow- up.  No follow up labs or test are   needed.            Unresulted Labs Ordered in the Past 30 Days of this Admission       No orders found from 6/20/2024 to 7/21/2024.        These results will be followed up by NA    Discharge Disposition   Discharged to home  Condition at discharge: Good    Consultations This Hospital Stay   UROLOGY IP CONSULT    Code Status   Full Code    Time Spent on this Encounter   I, Ruben Begum DO, personally saw the patient today and spent greater than 30 minutes discharging this patient.       DO IVAN Carpenter [place] [address] [facility]  [address] [place] [address]  Phone: [phone]  Fax: " [phone]  ______________________________________________________________________    Physical Exam   Vital Signs: Temp: 98.1  F (36.7  C) Temp src: Oral BP: 138/74 Pulse: 64   Resp: 18 SpO2: 95 % O2 Device: None (Room air)    Weight: 187 lbs 0 oz  General Appearance: Patient awake & alert.  No apparent distress.   Respiratory: Lungs are CTAB.  Work of breathing appears normal on room air.  Cardiovascular: Regular rate and rhythm.  No murmurs rubs or gallops.  There is no edema present.  GI: Benign.  Soft.  Non-tender.  Bowel sounds active.   Skin: No rashes or lesions exposed skin.  Neuro:  The patient is moving all extremities.  No obvious focal asymmetries.   Other: Patient is appropriate and oriented x3.        Primary Care Physician   [facility]    Discharge Orders      Reason for your hospital stay    You were hospitalized for hematuria (blood in the urine).  You underwent a cystoscopy (scope of your bladder) with urology where they found blood vessels that they then fulgurated.  You had a baig catheter with irrigation until your urine cleared.  We then tried to restart your plavix with immediate return of bloody urine.  You should stop your aspirin and plavix for now until you can talk with your cardiologist.     Follow-up and recommended labs and tests     Follow up with primary care provider, [facility], within 7 days for hospital follow- up.  No follow up labs or test are needed.     Activity    Your activity upon discharge: activity as tolerated     Diet    Follow this diet upon discharge: Regular       Significant Results and Procedures   Most Recent 3 CBC's:  Recent Labs   Lab Test 07/22/24  0634 07/21/24  0648 07/20/24  1439 07/20/24  0614   WBC 14.1* 10.2  --  8.0   HGB 12.1* 12.5* 12.9* 13.4   MCV 94 94  --  94    282  --  309     Most Recent 3 BMP's:  Recent Labs   Lab Test 07/22/24  0634 07/21/24  0648 07/20/24  1439 07/20/24  1434  07/20/24  0614    142 140  --  140   POTASSIUM 4.1 4.3 4.3  --  3.8   CHLORIDE 105 110* 106  --  105   CO2 24 22  --   --  23   BUN 14.4 15.6 20  --  23.3*   CR 1.34* 1.26* 1.4*  --  1.51*   ANIONGAP 11 10  --   --  12   JONELLE 8.9 9.1  --   --  9.1   * 109* 125*   < > 122*    < > = values in this interval not displayed.       Discharge Medications   Current Discharge Medication List        CONTINUE these medications which have NOT CHANGED    Details   atorvastatin (LIPITOR) 80 MG tablet Take 80 mg by mouth daily      lisinopril (ZESTRIL) 20 MG tablet Take 40 mg by mouth daily      metoprolol succinate ER (TOPROL XL) 50 MG 24 hr tablet Take 50 mg by mouth daily           STOP taking these medications       aspirin 81 MG EC tablet Comments:   Reason for Stopping:         clopidogrel (PLAVIX) 75 MG tablet Comments:   Reason for Stopping:             Allergies   No Known Allergies

## 2024-07-25 NOTE — PLAN OF CARE
"Goal Outcome Evaluation:      Plan of Care Reviewed With: patient    Overall Patient Progress: improving Overall Patient Progress: improving    Outcome Evaluation: CBI clamped tonight, urine now clear yellow. Denies pain    To Do:  End of Shift Summary  For vital signs and complete assessments, please see documentation flowsheets.     Pertinent assessments: A&Ox4. VSS. Denies pain and nausea. No SOB reported. CBI unclamped to slow rate at 0315 for bright red, cloudy output. Clamped again at 0430 - clear output since. No spasms reported overnight. LS clear, BS active. Tolerating reg diet. PIV SL. Up ind in room.     Major Shift Events: CBI clamping    Treatment Plan: Urology following. Wean CBI as able. Monitor for further bleeding and hgb. Possible discharge today. Follow up with cardiologist.     Bedside Nurse: Darrel Castillo RN          Problem: Adult Inpatient Plan of Care  Goal: Plan of Care Review  Description: The Plan of Care Review/Shift note should be completed every shift.  The Outcome Evaluation is a brief statement about your assessment that the patient is improving, declining, or no change.  This information will be displayed automatically on your shift  note.  Outcome: Progressing  Flowsheets (Taken 7/25/2024 0611)  Outcome Evaluation: CBI clamped tonight, urine now clear yellow. Denies pain  Overall Patient Progress: improving  Goal: Patient-Specific Goal (Individualized)  Description: You can add care plan individualizations to a care plan. Examples of Individualization might be:  \"Parent requests to be called daily at 9am for status\", \"I have a hard time hearing out of my right ear\", or \"Do not touch me to wake me up as it startles  me\".  Outcome: Progressing  Goal: Absence of Hospital-Acquired Illness or Injury  Outcome: Progressing  Intervention: Identify and Manage Fall Risk  Recent Flowsheet Documentation  Taken 7/24/2024 2028 by Darrel Castillo, RN  Safety Promotion/Fall Prevention:   assistive " device/personal items within reach   safety round/check completed   room organization consistent   clutter free environment maintained   increased rounding and observation   increase visualization of patient   nonskid shoes/slippers when out of bed   room near nurse's station  Intervention: Prevent Skin Injury  Recent Flowsheet Documentation  Taken 7/24/2024 2028 by Darrel Castillo RN  Body Position: position changed independently  Intervention: Prevent and Manage VTE (Venous Thromboembolism) Risk  Recent Flowsheet Documentation  Taken 7/24/2024 2028 by Darrel Castillo RN  VTE Prevention/Management: SCDs off (sequential compression devices)  Intervention: Prevent Infection  Recent Flowsheet Documentation  Taken 7/24/2024 2028 by Darrel Castillo RN  Infection Prevention:   single patient room provided   equipment surfaces disinfected   hand hygiene promoted   rest/sleep promoted  Goal: Optimal Comfort and Wellbeing  Outcome: Progressing  Goal: Readiness for Transition of Care  Outcome: Progressing     Problem: Pain Acute  Goal: Optimal Pain Control and Function  Outcome: Progressing  Intervention: Prevent or Manage Pain  Recent Flowsheet Documentation  Taken 7/24/2024 2028 by Darrel Castillo RN  Medication Review/Management: medications reviewed     Problem: Comorbidity Management  Goal: Blood Pressure in Desired Range  Outcome: Progressing  Intervention: Maintain Blood Pressure Management  Recent Flowsheet Documentation  Taken 7/24/2024 2028 by Darrel Castillo RN  Medication Review/Management: medications reviewed     Problem: Skin Injury Risk Increased  Goal: Skin Health and Integrity  Outcome: Progressing  Intervention: Plan: Nurse Driven Intervention: Moisture Management  Recent Flowsheet Documentation  Taken 7/24/2024 2028 by Darrel Castillo RN  Moisture Interventions:   Incontinence pad   Encourage regular toileting  Intervention: Optimize Skin Protection  Recent Flowsheet Documentation  Taken 7/24/2024 2028 by  Darrel Castillo, RN  Activity Management:   activity adjusted per tolerance   activity encouraged  Head of Bed (HOB) Positioning: HOB at 30-45 degrees

## 2024-07-25 NOTE — PROGRESS NOTES
[facility] Urology Progress Note          Assessment and Plan:     Assessment:    POD 4 Cystoscopy with fulguration of hemorrhaging blood vessel and thrombus removal    Gross hematuria    CAD      Plan:   -Discontinued 3-way Jansen catheter and CBI.  -Patient should void and check post void residual is less than 300 mL.  -Hand irrigate Jansen catheter, as needed, for bladder spasms, clots, or decreased output.  -Discontinue Detrol 4 mg for bladder spasms, as catheter has been removed.  -Continue to monitor hemoglobin.  -Plan to hold Plavix and aspirin and have close outpatient follow up with cardiology.  -Discussed with Dr. Alexander. No need for follow up cystoscopy at this time and plan to hold off on finasteride.  -If able to urinate, anticipate discharge home today without Jansen catheter.    Patience Bethea PA-C   [facility]  [phone]               Interval History:     Doing okay.  Denies any lightheaded or dizziness.  Afebrile without tachycardia.  Three-way Jansen catheter in place with CBI clamped.  Urine is clear rosita with a hint of brown sediment.  Some hematuria yesterday.  Plavix and aspirin have been held. Denies N/V/F/C/SOB/CP.               Review of Systems:     The 5 point Review of Systems is negative other than noted in the HPI             Medications:     Current Facility-Administered Medications   Medication Dose Route Frequency Provider Last Rate Last Admin    acetaminophen (TYLENOL) tablet 650 mg  650 mg Oral Q4H PRN Ruben Begum DO   650 mg at 07/21/24 1446    Or    acetaminophen (TYLENOL) Suppository 650 mg  650 mg Rectal Q4H PRN Ruben Begum DO        atorvastatin (LIPITOR) tablet 80 mg  80 mg Oral Daily Ruben Begum DO   80 mg at 07/25/24 0914    calcium carbonate (TUMS) chewable tablet 1,000 mg  1,000 mg Oral 4x Daily PRN Ruben Begum DO        lidocaine (LMX4) cream   Topical Q1H PRN Ruben Begum DO        lidocaine 1 % 0.1-1 mL  0.1-1 mL  Other Q1H PRN Ruben Begum DO        lisinopril (ZESTRIL) tablet 40 mg  40 mg Oral Daily Ruben Begum DO   40 mg at 07/25/24 0914    melatonin tablet 1 mg  1 mg Oral At Bedtime PRN Ruben Begum DO        metoprolol succinate ER (TOPROL XL) 24 hr tablet 50 mg  50 mg Oral Daily Ruben Begum DO   50 mg at 07/25/24 0914    senna-docusate (SENOKOT-S/PERICOLACE) 8.6-50 MG per tablet 1 tablet  1 tablet Oral BID PRN Ruben Begum DO        Or    senna-docusate (SENOKOT-S/PERICOLACE) 8.6-50 MG per tablet 2 tablet  2 tablet Oral BID PRN Ruben Begum DO        sodium chloride (PF) 0.9% PF flush 3 mL  3 mL Intracatheter Q8H Ruben Begum DO   3 mL at 07/25/24 0916    sodium chloride (PF) 0.9% PF flush 3 mL  3 mL Intracatheter q1 min prn Ruben Begum DO        sodium chloride 0.9% irrigation (bag)   Irrigation Continuous Claudy Dotson MD   3,000 mL at 07/24/24 0517                  Physical Exam:   Vitals were reviewed  Patient Vitals for the past 8 hrs:   BP Temp Temp src Pulse Resp SpO2   07/25/24 0800 138/74 98.1  F (36.7  C) Oral 64 18 95 %     GEN: NAD, sitting up in bed  EYES: EOMI  MOUTH: MMM  NECK: Supple  RESP: Unlabored breathing  SKIN: Warm  NEURO: AAO  URO: Three-way Jansen catheter in place with CBI clamped.  Urine is clear rosita with a hint of brown sediment.             Data:     Lab Results   Component Value Date    NTBNPI 500 11/09/2023     Lab Results   Component Value Date    WBC 14.1 (H) 07/22/2024    WBC 10.2 07/21/2024    WBC 8.0 07/20/2024    HGB 12.1 (L) 07/22/2024    HGB 12.5 (L) 07/21/2024    HGB 12.9 (L) 07/20/2024    HCT 36.4 (L) 07/22/2024    HCT 37.9 (L) 07/21/2024    HCT 38 (L) 07/20/2024    MCV 94 07/22/2024    MCV 94 07/21/2024    MCV 94 07/20/2024     07/22/2024     07/21/2024     07/20/2024     Lab Results   Component Value Date    INR 0.88 10/05/2016      All cultures:  Recent Labs   Lab 07/20/24  0746   CULTURE No Growth

## 2024-07-25 NOTE — DISCHARGE INSTRUCTIONS
UROLOGY    If you have issues with recurrent blood in the urine or difficulty with urination, please contact us.    [facility]   [phone]

## 2024-09-04 ENCOUNTER — OFFICE VISIT (OUTPATIENT)
Dept: CARDIOLOGY | Facility: CLINIC | Age: 69
End: 2024-09-04
Payer: COMMERCIAL

## 2024-09-04 VITALS
WEIGHT: 189.2 LBS | OXYGEN SATURATION: 100 % | HEART RATE: 64 BPM | SYSTOLIC BLOOD PRESSURE: 138 MMHG | BODY MASS INDEX: 28.02 KG/M2 | HEIGHT: 69 IN | DIASTOLIC BLOOD PRESSURE: 84 MMHG

## 2024-09-04 DIAGNOSIS — I25.10 CORONARY ARTERY DISEASE INVOLVING NATIVE CORONARY ARTERY OF NATIVE HEART WITHOUT ANGINA PECTORIS: Primary | ICD-10-CM

## 2024-09-04 DIAGNOSIS — I25.5 ISCHEMIC CARDIOMYOPATHY: ICD-10-CM

## 2024-09-04 DIAGNOSIS — I10 BENIGN ESSENTIAL HYPERTENSION: ICD-10-CM

## 2024-09-04 DIAGNOSIS — E78.5 HYPERLIPIDEMIA LDL GOAL <70: ICD-10-CM

## 2024-09-04 LAB
ERYTHROCYTE [DISTWIDTH] IN BLOOD BY AUTOMATED COUNT: 12.9 % (ref 10–15)
HCT VFR BLD AUTO: 43.1 % (ref 40–53)
HGB BLD-MCNC: 14.2 G/DL (ref 13.3–17.7)
MCH RBC QN AUTO: 31 PG (ref 26.5–33)
MCHC RBC AUTO-ENTMCNC: 32.9 G/DL (ref 31.5–36.5)
MCV RBC AUTO: 94 FL (ref 78–100)
PLATELET # BLD AUTO: 306 10E3/UL (ref 150–450)
RBC # BLD AUTO: 4.58 10E6/UL (ref 4.4–5.9)
WBC # BLD AUTO: 7.2 10E3/UL (ref 4–11)

## 2024-09-04 PROCEDURE — 99205 OFFICE O/P NEW HI 60 MIN: CPT | Performed by: INTERNAL MEDICINE

## 2024-09-04 PROCEDURE — 36415 COLL VENOUS BLD VENIPUNCTURE: CPT | Performed by: INTERNAL MEDICINE

## 2024-09-04 PROCEDURE — 85027 COMPLETE CBC AUTOMATED: CPT | Performed by: INTERNAL MEDICINE

## 2024-09-04 PROCEDURE — G2211 COMPLEX E/M VISIT ADD ON: HCPCS | Performed by: INTERNAL MEDICINE

## 2024-09-04 RX ORDER — NITROGLYCERIN 0.4 MG/1
TABLET SUBLINGUAL
Qty: 25 TABLET | Refills: 3 | Status: SHIPPED | OUTPATIENT
Start: 2024-09-04

## 2024-09-04 RX ORDER — CLOPIDOGREL BISULFATE 75 MG/1
75 TABLET ORAL DAILY
Qty: 90 TABLET | Refills: 3 | Status: SHIPPED | OUTPATIENT
Start: 2024-09-04

## 2024-09-04 RX ORDER — ATORVASTATIN CALCIUM 80 MG/1
80 TABLET, FILM COATED ORAL DAILY
Qty: 90 TABLET | Refills: 3 | Status: SHIPPED | OUTPATIENT
Start: 2024-09-04

## 2024-09-04 RX ORDER — LISINOPRIL 40 MG/1
40 TABLET ORAL DAILY
Qty: 90 TABLET | Refills: 3 | Status: SHIPPED | OUTPATIENT
Start: 2024-09-04

## 2024-09-04 RX ORDER — METOPROLOL SUCCINATE 50 MG/1
50 TABLET, EXTENDED RELEASE ORAL DAILY
Qty: 90 TABLET | Refills: 3 | Status: SHIPPED | OUTPATIENT
Start: 2024-09-04

## 2024-09-04 RX ORDER — LISINOPRIL 40 MG/1
TABLET ORAL
COMMUNITY
Start: 2024-08-10 | End: 2024-09-04

## 2024-09-04 RX ORDER — NITROGLYCERIN 0.4 MG/1
TABLET SUBLINGUAL
COMMUNITY
End: 2024-09-04

## 2024-09-04 NOTE — PROGRESS NOTES
Cardiology Clinic Consultation:    September 4, 2024   Patient Name: Issac Jackson  Patient MRN: [id number]    Consult indication: CAD    HPI:    I had the opportunity to see patient Issac Jackson in cardiology clinic for a consultation. Patient is followed by our colleague [facility] with Primary Care.     As you know, patient is a pleasant 68-year-old male with a complex cardiac history notable for recovered ischemic cardiomyopathy, inferior STEMI status post PCI with GOLDEN to RCA (10/2016), staged PCI to mid LAD (11/2016), known  of mid left circumflex and RPL a branch of RCA, hypertension, hyperlipidemia, who presents to establish care.    [facility]  Reviewed prior medical history.  Copy and pasted below.    Coronary angiogram (11/08/16)  1. Two vessel CAD        - 80% mid LAD stenosis        - 100% chronic total occlusion of mid LCx. Distal LCx and OM2 fill via collaterals from the RCA.        2. Patent stent in proximal RCA. No significant disease in RPDA. There is a RPLA branch which has 100% chronic total occlusion which fills late via right to right collaterals.        3. Successful PCI to mid LAD with Synergy 2.5x28mm GOLDEN (post dilated with 3.0mm NC balloon)        4. LVEDP 13mmHg     Coronary angiogram (10/05/16)  1. Three vessel CAD        - 99% subtotal occlusion of proximal RCA with RASHAD 1 flow (culprit). Mid RCA has an aneurysmal segment.        - 100% chronic total occlusion of mid LCx. Distal LCx fills via collaterals from the RCA. The lesion was probed with Runthrough wire and appears chronic.        - Severe 80% mid LAD stenosis        2. Successful PCI to proximal RCA with Synergy 4.0x20mm GOLDEN (post dilated with 4.5mm NC balloon).         3. LVEDP 19mmHg     Echo 11/23  Summary  1. Sinus bradycardia during study.  2. Normal LV size with normal wall thickness. Visually estimated LVEF  50-55%.  3. Base-mid  inferior severe hypokinesis.  4. Normal RV size and systolic function.  5. Moderate LA enlargement.  6. Mild-moderate mitral valve regurgitation.  7. Compared to the prior study from 2/13/2017, the current study reveals   no  significant change.     Patient hospitalized at [facility] 7/2024 with hematuria, seen in consultation by Urology, underwent cystoscopy demonstrating small bleeding vessels, aspirin and clopidogrel have been discontinued due to hematuria.  While hospitalized, he was challenged with clopidogrel however had recurrent hematuria and so on discharge was not on any antiplatelet agents.  Since then, he has remained off of aspirin and clopidogrel.    At baseline patient is very physically active, exercises regularly, denies any chest pain, chest pressure, abnormal shortness of breath.  Overall feels quite well.  No hematuria since discharge.      BP today in clinic 138/84 mmHg however he reports blood pressures at home are generally in the 120 over 70 mmHg range.    Assessment and Plan/Recommendations:    # CAD with inferior STEMI 2016 s/p PCI to RCA, LAD, known LCx  and RPLA branch  with collaterals. No angina  # Hematuria, requiring discontinuation of aspirin and clopidogrel  # HTN, stable  # HL, on statin     -Reviewed prior cardiac history in detail.  This is a challenging clinical scenario, clearly patient would benefit from antiplatelet therapy, his prior cardiology team had recommended dual antiplatelet therapy indefinitely if tolerated given his complex and significant coronary artery disease history.  I concur that this would be optimal, however now with recent hospitalization for clinically significant hematuria, there is likely a high risk of bleeding.  As such, would favor initiation of single antiplatelet therapy, weighing risks/benefits, would favor clopidogrel 75 mg daily.  Discussed risks of bleeding weighed against benefits.  Patient voiced understanding and was in  agreement.  Will check a hemoglobin today and in 1 week, advised to monitor for evidence of recurrence of hematuria, in which case we would recommend follow-up with Urology  - Continue remainder of cardiac regimen including lisinopril, metoprolol succinate, atorvastatin, sublingual nitroglycerin as needed  - Follow-up in 6 months with labs, or sooner as needed       Thank you for allowing our team to participate in the care of Issac Jackson.  Please do not hesitate to call or page me with any questions or concerns.    Sincerely,     Kraig Mac MD, [facility]  Cardiology  September 4, 2024    Voice recognition software utilized.     Total time spent on this encounter today: Greater than 60 minutes, providing care in this encounter including, but not limited to, reviewing prior medical records, laboratory data, imaging studies, diagnostic studies, procedure notes, formulating an assessment and plan, recommendations, discussion and counseling with patient face to face, dictation.    The longitudinal plan of care for the diagnosis(es)/condition(s) as documented were addressed during this visit. Due to the added complexity in care, I will continue to support Issac in the subsequent management and with ongoing continuity of care.     Past Medical History:     Patient Active Problem List   Diagnosis    Gross hematuria    Coronary artery disease involving native coronary artery of native heart without angina pectoris    Ischemic cardiomyopathy    Benign essential hypertension    Hyperlipidemia LDL goal <70       Past Surgical History:   Past Surgical History:   Procedure Laterality Date    CYSTOSCOPY, FULGURATE BLEEDERS, EVACUATE CLOT(S), COMBINED N/A 7/21/2024    Procedure: CYSTOSCOPY, WITH FULGURATION OF HEMORRHAGING BLOOD VESSEL AND THROMBUS REMOVAL;  Surgeon: Noah Adame MD;  Location: [facility] OR    HERNIA REPAIR         Medications (outpatient):  Current Outpatient Medications   Medication Sig Dispense  "Refill    atorvastatin (LIPITOR) 80 MG tablet Take 1 tablet (80 mg) by mouth daily. 90 tablet 3    clopidogrel (PLAVIX) 75 MG tablet Take 1 tablet (75 mg) by mouth daily. 90 tablet 3    lisinopril (ZESTRIL) 40 MG tablet Take 1 tablet (40 mg) by mouth daily. 90 tablet 3    metoprolol succinate ER (TOPROL XL) 50 MG 24 hr tablet Take 1 tablet (50 mg) by mouth daily. 90 tablet 3    nitroGLYcerin (NITROSTAT) 0.4 MG sublingual tablet For chest pain place 1 tablet under the tongue every 5 minutes for 3 doses. If symptoms persist 5 minutes after 1st dose call 911. 25 tablet 3       Allergies:  No Known Allergies    Social History:   History   Drug Use Unknown      History   Smoking Status    Former    Types: Cigarettes   Smokeless Tobacco    Never     Social History    Substance and Sexual Activity      Alcohol use: Yes        Comment: very rarely       Family History:  History reviewed. No pertinent family history.    Review of Systems:   A comprehensive 12 system review of systems was carried out.  Pertinent positives and negatives are noted above. Otherwise negative for contributory information.    Objective & Physical Exam:  /84   Pulse 64   Ht 1.753 m (5' 9\")   Wt 85.8 kg (189 lb 3.2 oz)   SpO2 100%   BMI 27.94 kg/m    Wt Readings from Last 2 Encounters:   09/04/24 85.8 kg (189 lb 3.2 oz)   07/21/24 84.8 kg (187 lb)     Body mass index is 27.94 kg/m .   Body surface area is 2.04 meters squared.    Constitutional: appears stated age, in no apparent distress, appears to be well nourished  Pulmonary: clear to auscultation bilaterally, no wheezes, no rales, no increased work of breathing  Cardiovascular: JVP normal, regular rate, regular rhythm, no murmur appreciated, no lower extremity edema  Neurologic: awake, alert, moves all extremities  Skin: no jaundice, warm on limited exam    Data reviewed:  Lab Results   Component Value Date    WBC 7.2 09/04/2024    WBC 12.5 (H) 10/05/2016    RBC 4.58 09/04/2024    RBC " 4.96 10/05/2016    HGB 14.2 09/04/2024    HGB 15.1 10/05/2016    HCT 43.1 09/04/2024    HCT 45.1 10/05/2016    MCV 94 09/04/2024    MCV 91 10/05/2016    MCH 31.0 09/04/2024    MCH 30.4 10/05/2016    MCHC 32.9 09/04/2024    MCHC 33.5 10/05/2016    RDW 12.9 09/04/2024    RDW 13.0 10/05/2016     09/04/2024     10/05/2016     Sodium   Date Value Ref Range Status   07/22/2024 140 135 - 145 mmol/L Final   10/05/2016 138 133 - 144 mmol/L Final     Potassium   Date Value Ref Range Status   07/22/2024 4.1 3.4 - 5.3 mmol/L Final   10/05/2016 3.3 (L) 3.4 - 5.3 mmol/L Final     Potassium POCT   Date Value Ref Range Status   07/20/2024 4.3 3.4 - 5.3 mmol/L Final     Chloride   Date Value Ref Range Status   07/22/2024 105 98 - 107 mmol/L Final   10/05/2016 102 94 - 109 mmol/L Final     Chloride POCT   Date Value Ref Range Status   07/20/2024 106 94 - 109 mmol/L Final     Carbon Dioxide   Date Value Ref Range Status   10/05/2016 28 20 - 32 mmol/L Final     Carbon Dioxide (CO2)   Date Value Ref Range Status   07/22/2024 24 22 - 29 mmol/L Final     Anion Gap   Date Value Ref Range Status   07/22/2024 11 7 - 15 mmol/L Final   10/05/2016 8 3 - 14 mmol/L Final     Glucose   Date Value Ref Range Status   07/22/2024 103 (H) 70 - 99 mg/dL Final   10/05/2016 124 (H) 70 - 99 mg/dL Final     GLUCOSE BY METER POCT   Date Value Ref Range Status   07/20/2024 119 (H) 70 - 99 mg/dL Final     Comment:     /RN Notified     Glucose Whole Blood POCT   Date Value Ref Range Status   07/20/2024 125 (H) 70 - 99 mg/dL Final     Urea Nitrogen   Date Value Ref Range Status   07/22/2024 14.4 8.0 - 23.0 mg/dL Final   10/05/2016 20 7 - 30 mg/dL Final     UREA NITROGEN POCT   Date Value Ref Range Status   07/20/2024 20 7 - 30 mg/dL Final     Creatinine   Date Value Ref Range Status   07/22/2024 1.34 (H) 0.67 - 1.17 mg/dL Final   10/05/2016 1.63 (H) 0.66 - 1.25 mg/dL Final     GFR Estimate   Date Value Ref Range Status   07/22/2024 57 (L) >60  "mL/min/1.73m2 Final     Comment:     eGFR calculated using 2021 CKD-EPI equation.   10/05/2016 43 (L) >60 mL/min/1.7m2 Final     Comment:     Non  GFR Calc     Calcium   Date Value Ref Range Status   07/22/2024 8.9 8.8 - 10.4 mg/dL Final     Comment:     Reference intervals for this test were updated on 7/16/2024 to reflect our healthy population more accurately. There may be differences in the flagging of prior results with similar values performed with this method. Those prior results can be interpreted in the context of the updated reference intervals.   10/05/2016 8.2 (L) 8.5 - 10.1 mg/dL Final     Bilirubin Total   Date Value Ref Range Status   07/21/2024 0.8 <=1.2 mg/dL Final     Alkaline Phosphatase   Date Value Ref Range Status   07/21/2024 103 40 - 150 U/L Final     ALT   Date Value Ref Range Status   07/21/2024 20 0 - 70 U/L Final     AST   Date Value Ref Range Status   07/21/2024 20 0 - 45 U/L Final     No results for input(s): \"CHOL\", \"HDL\", \"LDL\", \"TRIG\", \"CHOLHDLRATIO\" in the last 25180 hours.   No results found for: \"A1C\"     No results found for this or any previous visit (from the past 4320 hour(s)).     "

## 2024-09-04 NOTE — LETTER
9/4/2024    [facility]  [address] [address] [address]    RE: Issac Jackson       Dear Colleague,     I had the pleasure of seeing Issac Jackson in the [facility].      Cardiology Clinic Consultation:    September 4, 2024   Patient Name: Issac Jackson  Patient MRN: [id number]    Consult indication: CAD    HPI:    I had the opportunity to see patient Issac Jackson in cardiology clinic for a consultation. Patient is followed by our colleague [facility] with Primary Care.     As you know, patient is a pleasant 68-year-old male with a complex cardiac history notable for recovered ischemic cardiomyopathy, inferior STEMI status post PCI with GOLDEN to RCA (10/2016), staged PCI to mid LAD (11/2016), known  of mid left circumflex and RPL a branch of RCA, hypertension, hyperlipidemia, who presents to establish care.    Patient previously received care through the [facility] system.  Reviewed prior medical history.  Copy and pasted below.    Coronary angiogram (11/08/16)  1. Two vessel CAD        - 80% mid LAD stenosis        - 100% chronic total occlusion of mid LCx. Distal LCx and OM2 fill via collaterals from the RCA.        2. Patent stent in proximal RCA. No significant disease in RPDA. There is a RPLA branch which has 100% chronic total occlusion which fills late via right to right collaterals.        3. Successful PCI to mid LAD with Synergy 2.5x28mm GOLDEN (post dilated with 3.0mm NC balloon)        4. LVEDP 13mmHg     Coronary angiogram (10/05/16)  1. Three vessel CAD        - 99% subtotal occlusion of proximal RCA with RASHAD 1 flow (culprit). Mid RCA has an aneurysmal segment.        - 100% chronic total occlusion of mid LCx. Distal LCx fills via collaterals from the RCA. The lesion was probed with Runthrough wire and appears chronic.        - Severe 80% mid LAD stenosis        2. Successful PCI to proximal RCA with Synergy 4.0x20mm  GOLDEN (post dilated with 4.5mm NC balloon).         3. LVEDP 19mmHg     Echo 11/23  Summary  1. Sinus bradycardia during study.  2. Normal LV size with normal wall thickness. Visually estimated LVEF  50-55%.  3. Base-mid inferior severe hypokinesis.  4. Normal RV size and systolic function.  5. Moderate LA enlargement.  6. Mild-moderate mitral valve regurgitation.  7. Compared to the prior study from 2/13/2017, the current study reveals   no  significant change.     Patient hospitalized at [facility] 7/2024 with hematuria, seen in consultation by Urology, underwent cystoscopy demonstrating small bleeding vessels, aspirin and clopidogrel have been discontinued due to hematuria.  While hospitalized, he was challenged with clopidogrel however had recurrent hematuria and so on discharge was not on any antiplatelet agents.  Since then, he has remained off of aspirin and clopidogrel.    At baseline patient is very physically active, exercises regularly, denies any chest pain, chest pressure, abnormal shortness of breath.  Overall feels quite well.  No hematuria since discharge.      BP today in clinic 138/84 mmHg however he reports blood pressures at home are generally in the 120 over 70 mmHg range.    Assessment and Plan/Recommendations:    # CAD with inferior STEMI 2016 s/p PCI to RCA, LAD, known LCx  and RPLA branch  with collaterals. No angina  # Hematuria, requiring discontinuation of aspirin and clopidogrel  # HTN, stable  # HL, on statin     -Reviewed prior cardiac history in detail.  This is a challenging clinical scenario, clearly patient would benefit from antiplatelet therapy, his prior cardiology team had recommended dual antiplatelet therapy indefinitely if tolerated given his complex and significant coronary artery disease history.  I concur that this would be optimal, however now with recent hospitalization for clinically significant hematuria, there is likely a high risk of bleeding.  As  such, would favor initiation of single antiplatelet therapy, weighing risks/benefits, would favor clopidogrel 75 mg daily.  Discussed risks of bleeding weighed against benefits.  Patient voiced understanding and was in agreement.  Will check a hemoglobin today and in 1 week, advised to monitor for evidence of recurrence of hematuria, in which case we would recommend follow-up with Urology  - Continue remainder of cardiac regimen including lisinopril, metoprolol succinate, atorvastatin, sublingual nitroglycerin as needed  - Follow-up in 6 months with labs, or sooner as needed       Thank you for allowing our team to participate in the care of Issac KATE Jackson.  Please do not hesitate to call or page me with any questions or concerns.    Sincerely,     Kraig Mac MD, [facility]  Cardiology  September 4, 2024    Voice recognition software utilized.     Total time spent on this encounter today: Greater than 60 minutes, providing care in this encounter including, but not limited to, reviewing prior medical records, laboratory data, imaging studies, diagnostic studies, procedure notes, formulating an assessment and plan, recommendations, discussion and counseling with patient face to face, dictation.    The longitudinal plan of care for the diagnosis(es)/condition(s) as documented were addressed during this visit. Due to the added complexity in care, I will continue to support Issac in the subsequent management and with ongoing continuity of care.     Past Medical History:     Patient Active Problem List   Diagnosis     Gross hematuria     Coronary artery disease involving native coronary artery of native heart without angina pectoris     Ischemic cardiomyopathy     Benign essential hypertension     Hyperlipidemia LDL goal <70       Past Surgical History:   Past Surgical History:   Procedure Laterality Date     CYSTOSCOPY, FULGURATE BLEEDERS, EVACUATE CLOT(S), COMBINED N/A 7/21/2024    Procedure: CYSTOSCOPY,  "WITH FULGURATION OF HEMORRHAGING BLOOD VESSEL AND THROMBUS REMOVAL;  Surgeon: Noah Adame MD;  Location: [facility] OR     HERNIA REPAIR         Medications (outpatient):  Current Outpatient Medications   Medication Sig Dispense Refill     atorvastatin (LIPITOR) 80 MG tablet Take 1 tablet (80 mg) by mouth daily. 90 tablet 3     clopidogrel (PLAVIX) 75 MG tablet Take 1 tablet (75 mg) by mouth daily. 90 tablet 3     lisinopril (ZESTRIL) 40 MG tablet Take 1 tablet (40 mg) by mouth daily. 90 tablet 3     metoprolol succinate ER (TOPROL XL) 50 MG 24 hr tablet Take 1 tablet (50 mg) by mouth daily. 90 tablet 3     nitroGLYcerin (NITROSTAT) 0.4 MG sublingual tablet For chest pain place 1 tablet under the tongue every 5 minutes for 3 doses. If symptoms persist 5 minutes after 1st dose call 911. 63 tablet 3       Allergies:  No Known Allergies    Social History:   History   Drug Use Unknown      History   Smoking Status     Former     Types: Cigarettes   Smokeless Tobacco     Never     Social History    Substance and Sexual Activity      Alcohol use: Yes        Comment: very rarely       Family History:  History reviewed. No pertinent family history.    Review of Systems:   A comprehensive 12 system review of systems was carried out.  Pertinent positives and negatives are noted above. Otherwise negative for contributory information.    Objective & Physical Exam:  /84   Pulse 64   Ht 1.753 m (5' 9\")   Wt 85.8 kg (189 lb 3.2 oz)   SpO2 100%   BMI 27.94 kg/m    Wt Readings from Last 2 Encounters:   09/04/24 85.8 kg (189 lb 3.2 oz)   07/21/24 84.8 kg (187 lb)     Body mass index is 27.94 kg/m .   Body surface area is 2.04 meters squared.    Constitutional: appears stated age, in no apparent distress, appears to be well nourished  Pulmonary: clear to auscultation bilaterally, no wheezes, no rales, no increased work of breathing  Cardiovascular: JVP normal, regular rate, regular rhythm, no murmur appreciated, no lower " extremity edema  Neurologic: awake, alert, moves all extremities  Skin: no jaundice, warm on limited exam    Data reviewed:  Lab Results   Component Value Date    WBC 7.2 09/04/2024    WBC 12.5 (H) 10/05/2016    RBC 4.58 09/04/2024    RBC 4.96 10/05/2016    HGB 14.2 09/04/2024    HGB 15.1 10/05/2016    HCT 43.1 09/04/2024    HCT 45.1 10/05/2016    MCV 94 09/04/2024    MCV 91 10/05/2016    MCH 31.0 09/04/2024    MCH 30.4 10/05/2016    MCHC 32.9 09/04/2024    MCHC 33.5 10/05/2016    RDW 12.9 09/04/2024    RDW 13.0 10/05/2016     09/04/2024     10/05/2016     Sodium   Date Value Ref Range Status   07/22/2024 140 135 - 145 mmol/L Final   10/05/2016 138 133 - 144 mmol/L Final     Potassium   Date Value Ref Range Status   07/22/2024 4.1 3.4 - 5.3 mmol/L Final   10/05/2016 3.3 (L) 3.4 - 5.3 mmol/L Final     Potassium POCT   Date Value Ref Range Status   07/20/2024 4.3 3.4 - 5.3 mmol/L Final     Chloride   Date Value Ref Range Status   07/22/2024 105 98 - 107 mmol/L Final   10/05/2016 102 94 - 109 mmol/L Final     Chloride POCT   Date Value Ref Range Status   07/20/2024 106 94 - 109 mmol/L Final     Carbon Dioxide   Date Value Ref Range Status   10/05/2016 28 20 - 32 mmol/L Final     Carbon Dioxide (CO2)   Date Value Ref Range Status   07/22/2024 24 22 - 29 mmol/L Final     Anion Gap   Date Value Ref Range Status   07/22/2024 11 7 - 15 mmol/L Final   10/05/2016 8 3 - 14 mmol/L Final     Glucose   Date Value Ref Range Status   07/22/2024 103 (H) 70 - 99 mg/dL Final   10/05/2016 124 (H) 70 - 99 mg/dL Final     GLUCOSE BY METER POCT   Date Value Ref Range Status   07/20/2024 119 (H) 70 - 99 mg/dL Final     Comment:     /RN Notified     Glucose Whole Blood POCT   Date Value Ref Range Status   07/20/2024 125 (H) 70 - 99 mg/dL Final     Urea Nitrogen   Date Value Ref Range Status   07/22/2024 14.4 8.0 - 23.0 mg/dL Final   10/05/2016 20 7 - 30 mg/dL Final     UREA NITROGEN POCT   Date Value Ref Range Status  "  07/20/2024 20 7 - 30 mg/dL Final     Creatinine   Date Value Ref Range Status   07/22/2024 1.34 (H) 0.67 - 1.17 mg/dL Final   10/05/2016 1.63 (H) 0.66 - 1.25 mg/dL Final     GFR Estimate   Date Value Ref Range Status   07/22/2024 57 (L) >60 mL/min/1.73m2 Final     Comment:     eGFR calculated using 2021 CKD-EPI equation.   10/05/2016 43 (L) >60 mL/min/1.7m2 Final     Comment:     Non  GFR Calc     Calcium   Date Value Ref Range Status   07/22/2024 8.9 8.8 - 10.4 mg/dL Final     Comment:     Reference intervals for this test were updated on 7/16/2024 to reflect our healthy population more accurately. There may be differences in the flagging of prior results with similar values performed with this method. Those prior results can be interpreted in the context of the updated reference intervals.   10/05/2016 8.2 (L) 8.5 - 10.1 mg/dL Final     Bilirubin Total   Date Value Ref Range Status   07/21/2024 0.8 <=1.2 mg/dL Final     Alkaline Phosphatase   Date Value Ref Range Status   07/21/2024 103 40 - 150 U/L Final     ALT   Date Value Ref Range Status   07/21/2024 20 0 - 70 U/L Final     AST   Date Value Ref Range Status   07/21/2024 20 0 - 45 U/L Final     No results for input(s): \"CHOL\", \"HDL\", \"LDL\", \"TRIG\", \"CHOLHDLRATIO\" in the last 20359 hours.   No results found for: \"A1C\"     No results found for this or any previous visit (from the past 4320 hour(s)).     Thank you for allowing me to participate in the care of your patient.      Sincerely,     Kraig Mac MD     [facility]  cc:   Alan Johnston MD  No address on file      "

## 2024-09-04 NOTE — PATIENT INSTRUCTIONS
September 4, 2024    Thank you for allowing our Cardiology team to participate in your care.     Please note the following changes to your heart treatment plan:     Medication changes:   - start clopidogrel 75mg daily    Tests to be done:  - labs today and in 1 week  - labs in 6 months    Follow up:  - Follow up in 6 months, or sooner as needed      For scheduling, please call [phone].      Please contact our team through [employer] or our Nurse Team Voicemail service [phone], or the General Clinic [phone] for any questions or concerns.     If you are having a medical emergency, please call [phone].     Sincerely,    Kraig Mac MD, [facility]  Cardiology    [facility] - [facility] - [place]

## 2024-09-11 ENCOUNTER — LAB (OUTPATIENT)
Dept: LAB | Facility: CLINIC | Age: 69
End: 2024-09-11
Payer: COMMERCIAL

## 2024-09-11 DIAGNOSIS — I25.10 CORONARY ARTERY DISEASE INVOLVING NATIVE CORONARY ARTERY OF NATIVE HEART WITHOUT ANGINA PECTORIS: ICD-10-CM

## 2024-09-11 LAB
ERYTHROCYTE [DISTWIDTH] IN BLOOD BY AUTOMATED COUNT: 12.6 % (ref 10–15)
HCT VFR BLD AUTO: 41.7 % (ref 40–53)
HGB BLD-MCNC: 13.2 G/DL (ref 13.3–17.7)
MCH RBC QN AUTO: 30 PG (ref 26.5–33)
MCHC RBC AUTO-ENTMCNC: 31.7 G/DL (ref 31.5–36.5)
MCV RBC AUTO: 95 FL (ref 78–100)
PLATELET # BLD AUTO: 303 10E3/UL (ref 150–450)
RBC # BLD AUTO: 4.4 10E6/UL (ref 4.4–5.9)
WBC # BLD AUTO: 7.6 10E3/UL (ref 4–11)

## 2024-09-11 PROCEDURE — 36415 COLL VENOUS BLD VENIPUNCTURE: CPT

## 2024-09-11 PROCEDURE — 85027 COMPLETE CBC AUTOMATED: CPT

## 2024-09-12 ENCOUNTER — TELEPHONE (OUTPATIENT)
Dept: CARDIOLOGY | Facility: CLINIC | Age: 69
End: 2024-09-12
Payer: COMMERCIAL

## 2024-09-12 DIAGNOSIS — D64.9 ANEMIA: Primary | ICD-10-CM

## 2024-09-12 NOTE — RESULT ENCOUNTER NOTE
Hgb 13.2 down from 14.2, please see if he has noticed any hematuria, if not then recommend continuing current regimen with repeat CBC in 3 weeks thanks

## 2024-09-12 NOTE — TELEPHONE ENCOUNTER
----- Message from Kraig Mac sent at 9/12/2024  7:43 AM CDT -----  Hgb 13.2 down from 14.2, please see if he has noticed any hematuria, if not then recommend continuing current regimen with repeat CBC in 3 weeks thanks    --------------------------------------------    Patient called to inform that Dr. Mac has reviewed lab work and of above interpretation and recommendation. VM left asking patient to please return call to team 2 nurse line.

## 2024-09-12 NOTE — TELEPHONE ENCOUNTER
Spoke to patient who denies any signs of bleeding at this time. Order placed for repeat CBC in 3 weeks and patient will call scheduling to set this up.

## 2024-10-03 ENCOUNTER — LAB (OUTPATIENT)
Dept: LAB | Facility: CLINIC | Age: 69
End: 2024-10-03
Payer: COMMERCIAL

## 2024-10-03 DIAGNOSIS — D64.9 ANEMIA: ICD-10-CM

## 2024-10-03 LAB
ERYTHROCYTE [DISTWIDTH] IN BLOOD BY AUTOMATED COUNT: 12.5 % (ref 10–15)
HCT VFR BLD AUTO: 42.3 % (ref 40–53)
HGB BLD-MCNC: 13.5 G/DL (ref 13.3–17.7)
MCH RBC QN AUTO: 30.1 PG (ref 26.5–33)
MCHC RBC AUTO-ENTMCNC: 31.9 G/DL (ref 31.5–36.5)
MCV RBC AUTO: 94 FL (ref 78–100)
PLATELET # BLD AUTO: 274 10E3/UL (ref 150–450)
RBC # BLD AUTO: 4.48 10E6/UL (ref 4.4–5.9)
WBC # BLD AUTO: 7.7 10E3/UL (ref 4–11)

## 2024-10-03 PROCEDURE — 36415 COLL VENOUS BLD VENIPUNCTURE: CPT

## 2024-10-03 PROCEDURE — 85027 COMPLETE CBC AUTOMATED: CPT

## 2024-10-04 ENCOUNTER — TELEPHONE (OUTPATIENT)
Dept: CARDIOLOGY | Facility: CLINIC | Age: 69
End: 2024-10-04
Payer: COMMERCIAL

## 2024-10-04 NOTE — RESULT ENCOUNTER NOTE
Results reviewed, please let the patient know that overall findings are reassuring, stable Hgb.   Follow up as previously planned, thanks!

## 2024-10-04 NOTE — TELEPHONE ENCOUNTER
----- Message from Kraig Mac sent at 10/4/2024  8:06 AM CDT -----  Results reviewed, please let the patient know that overall findings are reassuring, stable Hgb.   Follow up as previously planned, thanks!      Spoke to patient, reviewed labs and message from Dr Mac. Follow up 3/2025 as planned. He asks if he should take an iron supplement, recommended discussing with PCP.

## 2025-02-27 ENCOUNTER — HOSPITAL ENCOUNTER (EMERGENCY)
Facility: CLINIC | Age: 70
Discharge: HOME | End: 2025-02-27
Attending: STUDENT IN AN ORGANIZED HEALTH CARE EDUCATION/TRAINING PROGRAM
Payer: COMMERCIAL

## 2025-02-27 VITALS
OXYGEN SATURATION: 98 % | BODY MASS INDEX: 29.71 KG/M2 | RESPIRATION RATE: 18 BRPM | WEIGHT: 200.62 LBS | HEIGHT: 69 IN | HEART RATE: 67 BPM | DIASTOLIC BLOOD PRESSURE: 100 MMHG | SYSTOLIC BLOOD PRESSURE: 186 MMHG | TEMPERATURE: 99.2 F

## 2025-02-27 DIAGNOSIS — R31.0 GROSS HEMATURIA: ICD-10-CM

## 2025-02-27 LAB
ALBUMIN UR-MCNC: ABNORMAL G/DL
APPEARANCE UR: ABNORMAL
BILIRUB UR QL STRIP: ABNORMAL
COLOR UR AUTO: ABNORMAL
GLUCOSE UR STRIP-MCNC: ABNORMAL MG/DL
HGB UR QL STRIP: ABNORMAL
KETONES UR STRIP-MCNC: ABNORMAL MG/DL
LEUKOCYTE ESTERASE UR QL STRIP: ABNORMAL
NITRATE UR QL: ABNORMAL
PH UR STRIP: ABNORMAL [PH]
RBC URINE: >182 /HPF
SP GR UR STRIP: ABNORMAL
UROBILINOGEN UR STRIP-MCNC: ABNORMAL MG/DL
WBC URINE: 44 /HPF

## 2025-02-27 PROCEDURE — 87086 URINE CULTURE/COLONY COUNT: CPT | Performed by: STUDENT IN AN ORGANIZED HEALTH CARE EDUCATION/TRAINING PROGRAM

## 2025-02-27 PROCEDURE — 99283 EMERGENCY DEPT VISIT LOW MDM: CPT

## 2025-02-27 PROCEDURE — 81001 URINALYSIS AUTO W/SCOPE: CPT | Performed by: STUDENT IN AN ORGANIZED HEALTH CARE EDUCATION/TRAINING PROGRAM

## 2025-02-27 ASSESSMENT — COLUMBIA-SUICIDE SEVERITY RATING SCALE - C-SSRS
6. HAVE YOU EVER DONE ANYTHING, STARTED TO DO ANYTHING, OR PREPARED TO DO ANYTHING TO END YOUR LIFE?: NO
2. HAVE YOU ACTUALLY HAD ANY THOUGHTS OF KILLING YOURSELF IN THE PAST MONTH?: NO
1. IN THE PAST MONTH, HAVE YOU WISHED YOU WERE DEAD OR WISHED YOU COULD GO TO SLEEP AND NOT WAKE UP?: NO

## 2025-02-27 ASSESSMENT — ACTIVITIES OF DAILY LIVING (ADL)
ADLS_ACUITY_SCORE: 58
ADLS_ACUITY_SCORE: 58

## 2025-02-27 NOTE — ED PROVIDER NOTES
"  Emergency Department Note      History of Present Illness     Chief Complaint   Hematuria    HPI   Issac Jackson is a 69 year old male on Plavix with a history of hypertension, hyperlipidemia, CAD, coronary artery stent, ischemic cardiomyopathy, and hernia repair who presents with hematuria which started three hours ago. He reports he noticed tiny clots of blood in his urine at the end of his stream. He reports he noticed the blood the 2nd time he urinated this morning. He denies dysuria, trouble urinating, fever, vomiting, back pain, lightheadedness, or abdominal pain. Last July, he had a similar presentation and was admitted yo the hospital for a cytoscopy and cauterization of three blood vessels in his bladder. He notes he continued to bleed and then once he was stopped on Plavix he stopped bleeding. He notes his hematuria was worse and the blood was \"thicker\" before his admission. His providers were Dr. Bethea and Dr. Alexander during his previous admission.     Independent Historian   None    Review of External Notes   Discharge summary reviewed from 7/25/2024 for hematuria.     Past Medical History     Medical History and Problem List   Anemia   CAD  Cataract   Hypertension  Hyperlipidemia   Ischemic cardiomyopathy   Stented coronary artery   ST elevation myocardial infarction involving right coronary artery     Medications   Atorvastatin   Clopidogrel   Lisinopril   Metoprolol succinate ER  Nitroglycerin     Surgical History   Cystoscopy, fulgurate bleeders, evacuate clot(s), combined  Hernia repair     Physical Exam     Patient Vitals for the past 24 hrs:   BP Temp Temp src Pulse Resp SpO2 Height Weight   02/27/25 1008 (!) 186/100 -- -- 67 18 98 % -- --   02/27/25 0811 (!) 180/101 99.2  F (37.3  C) Temporal 68 16 98 % 1.753 m (5' 9\") 91 kg (200 lb 9.9 oz)     Physical Exam  GENERAL: Patient well-appearing  HEAD: Atraumatic.  NECK: No rigidity  CV: RRR, no murmurs, rubs or gallops  PULM: CTAB with good " aeration; no retractions, rales, rhonchi, or wheezing  ABD: Soft, nontender, nondistended, no guarding  DERM: No rash. Skin warm and dry    Diagnostics     Lab Results   Labs Ordered and Resulted from Time of ED Arrival to Time of ED Departure   ROUTINE UA WITH MICROSCOPIC REFLEX TO CULTURE - Abnormal       Result Value    Color Urine Red (*)     Appearance Urine Cloudy (*)     Glucose Urine        Bilirubin Urine        Ketones Urine        Specific Gravity Urine        Blood Urine Large (*)     pH Urine        Protein Albumin Urine        Urobilinogen Urine        Nitrite Urine        Leukocyte Esterase Urine        RBC Urine >182 (*)     WBC Urine 44 (*)    URINE CULTURE     Independent Interpretation   None    ED Course      Discussion of Management   None    ED Course   ED Course as of 02/27/25 1056   Thu Feb 27, 2025   0835 I obtained the patient's history and examined as noted above.    1010 I rechecked the patient and explained findings.     Additional Documentation  None    Medical Decision Making / Diagnosis     CMS Diagnoses: None    MIPS       None    [facility]   Issac Jackson is a 69 year old male     Patient presenting with hematuria.  Chronic conditions complicating -prior hematuria, on Plavix.  Differential diagnosis-considered infection, stone, mass, trauma.  Labs demonstrated hematuria.  Urine not overtly infectious, however cannot get nitrites nor leukocyte esterase due to blood obstructing analysis.  However not having urinary symptoms such as dysuria or urinary frequency or abdominal pain or fever or vomiting.  Therefore do not think we need emergent antibiotics.  He has already had a CT scan for this before in the past which was unrevealing.  Do not think we need to repeat.  He is also had a cystoscopy before.  Ultimately, he is voiding without issue.  Without signs of obstruction, do not think he requires Jansen or further emergent workup.  However I did place a urology referral.  I recommend he  hold the Plavix until the bleeding stops, which she has done in the past.  I also recommend he discuss the Plavix with his cardiologist.    I have evaluated the patient for acute medical emergencies and have clinically decided no further acute medical interventions are required. Reassessed multiple times and well appearing. Patient stable for discharge. All questions answered. Given strict return precautions. Patient content with plan. The differential diagnosis and treatment modalities were discussed thoroughly with the patient. Recommended PCP follow-up in 2-3 days.      Disposition   The patient was discharged.     Diagnosis     ICD-10-CM    1. Gross hematuria  R31.0 Adult Urology  Referral         Scribe Disclosure:  TREVA, Jennifer Perera, am serving as a scribe at 8:39 AM on 2/27/2025 to document services personally performed by Krzysztof Huerta MD based on my observations and the provider's statements to me.           Krzysztof Huerta MD  02/27/25 1113

## 2025-02-27 NOTE — DISCHARGE INSTRUCTIONS
Return to the emergency department if symptoms are worsening, become concerning, or for any other concerns. Follow-up with your doctor in 2-3 and sooner if needed.    Hold your Plavix until the bleeding stops.  Discuss this with your cardiologist.

## 2025-02-27 NOTE — ED TRIAGE NOTES
Pt presents with darcy red blood in urine that started this AM with small clots. Feels he is emptying bladder. Had this occur back in July and followed up urology who performed a cystoscopy. Pt is on Plavix. Denies pain. Hx of HTN on lisinopril and metoprolol.

## 2025-02-28 LAB — BACTERIA UR CULT: NO GROWTH

## 2025-03-01 ENCOUNTER — HOSPITAL ENCOUNTER (EMERGENCY)
Facility: CLINIC | Age: 70
Discharge: HOME | End: 2025-03-01
Attending: EMERGENCY MEDICINE | Admitting: EMERGENCY MEDICINE
Payer: COMMERCIAL

## 2025-03-01 VITALS
BODY MASS INDEX: 29.98 KG/M2 | WEIGHT: 202.38 LBS | OXYGEN SATURATION: 98 % | RESPIRATION RATE: 18 BRPM | SYSTOLIC BLOOD PRESSURE: 190 MMHG | HEIGHT: 69 IN | HEART RATE: 58 BPM | DIASTOLIC BLOOD PRESSURE: 104 MMHG | TEMPERATURE: 97.7 F

## 2025-03-01 DIAGNOSIS — R31.0 GROSS HEMATURIA: ICD-10-CM

## 2025-03-01 LAB
ALBUMIN UR-MCNC: 30 MG/DL
ANION GAP SERPL CALCULATED.3IONS-SCNC: 9 MMOL/L (ref 7–15)
APPEARANCE UR: CLEAR
BACTERIA #/AREA URNS HPF: ABNORMAL /HPF
BASOPHILS # BLD AUTO: 0.1 10E3/UL (ref 0–0.2)
BASOPHILS NFR BLD AUTO: 1 %
BILIRUB UR QL STRIP: NEGATIVE
BUN SERPL-MCNC: 18.5 MG/DL (ref 8–23)
CALCIUM SERPL-MCNC: 9.8 MG/DL (ref 8.8–10.4)
CHLORIDE SERPL-SCNC: 103 MMOL/L (ref 98–107)
COLOR UR AUTO: ABNORMAL
CREAT SERPL-MCNC: 1.34 MG/DL (ref 0.67–1.17)
EGFRCR SERPLBLD CKD-EPI 2021: 57 ML/MIN/1.73M2
EOSINOPHIL # BLD AUTO: 0.1 10E3/UL (ref 0–0.7)
EOSINOPHIL NFR BLD AUTO: 2 %
ERYTHROCYTE [DISTWIDTH] IN BLOOD BY AUTOMATED COUNT: 12.5 % (ref 10–15)
GLUCOSE SERPL-MCNC: 107 MG/DL (ref 70–99)
GLUCOSE UR STRIP-MCNC: NEGATIVE MG/DL
HCO3 SERPL-SCNC: 28 MMOL/L (ref 22–29)
HCT VFR BLD AUTO: 44.2 % (ref 40–53)
HGB BLD-MCNC: 14.5 G/DL (ref 13.3–17.7)
HGB UR QL STRIP: ABNORMAL
HOLD SPECIMEN: NORMAL
IMM GRANULOCYTES # BLD: 0 10E3/UL
IMM GRANULOCYTES NFR BLD: 0 %
KETONES UR STRIP-MCNC: NEGATIVE MG/DL
LEUKOCYTE ESTERASE UR QL STRIP: NEGATIVE
LYMPHOCYTES # BLD AUTO: 1.3 10E3/UL (ref 0.8–5.3)
LYMPHOCYTES NFR BLD AUTO: 18 %
MCH RBC QN AUTO: 29.8 PG (ref 26.5–33)
MCHC RBC AUTO-ENTMCNC: 32.8 G/DL (ref 31.5–36.5)
MCV RBC AUTO: 91 FL (ref 78–100)
MONOCYTES # BLD AUTO: 0.6 10E3/UL (ref 0–1.3)
MONOCYTES NFR BLD AUTO: 8 %
NEUTROPHILS # BLD AUTO: 5.3 10E3/UL (ref 1.6–8.3)
NEUTROPHILS NFR BLD AUTO: 71 %
NITRATE UR QL: NEGATIVE
NRBC # BLD AUTO: 0 10E3/UL
NRBC BLD AUTO-RTO: 0 /100
PH UR STRIP: 5.5 [PH] (ref 5–7)
PLATELET # BLD AUTO: 300 10E3/UL (ref 150–450)
POTASSIUM SERPL-SCNC: 4.7 MMOL/L (ref 3.4–5.3)
RBC # BLD AUTO: 4.87 10E6/UL (ref 4.4–5.9)
RBC URINE: 58 /HPF
SODIUM SERPL-SCNC: 140 MMOL/L (ref 135–145)
SP GR UR STRIP: 1.01 (ref 1–1.03)
UROBILINOGEN UR STRIP-MCNC: NORMAL MG/DL
WBC # BLD AUTO: 7.5 10E3/UL (ref 4–11)
WBC URINE: 3 /HPF

## 2025-03-01 PROCEDURE — 85048 AUTOMATED LEUKOCYTE COUNT: CPT | Performed by: EMERGENCY MEDICINE

## 2025-03-01 PROCEDURE — 36415 COLL VENOUS BLD VENIPUNCTURE: CPT | Performed by: EMERGENCY MEDICINE

## 2025-03-01 PROCEDURE — 80048 BASIC METABOLIC PNL TOTAL CA: CPT | Performed by: EMERGENCY MEDICINE

## 2025-03-01 PROCEDURE — 85004 AUTOMATED DIFF WBC COUNT: CPT | Performed by: EMERGENCY MEDICINE

## 2025-03-01 PROCEDURE — 81001 URINALYSIS AUTO W/SCOPE: CPT | Performed by: EMERGENCY MEDICINE

## 2025-03-01 PROCEDURE — 99283 EMERGENCY DEPT VISIT LOW MDM: CPT

## 2025-03-01 ASSESSMENT — COLUMBIA-SUICIDE SEVERITY RATING SCALE - C-SSRS
1. IN THE PAST MONTH, HAVE YOU WISHED YOU WERE DEAD OR WISHED YOU COULD GO TO SLEEP AND NOT WAKE UP?: NO
2. HAVE YOU ACTUALLY HAD ANY THOUGHTS OF KILLING YOURSELF IN THE PAST MONTH?: NO
6. HAVE YOU EVER DONE ANYTHING, STARTED TO DO ANYTHING, OR PREPARED TO DO ANYTHING TO END YOUR LIFE?: NO

## 2025-03-01 ASSESSMENT — ACTIVITIES OF DAILY LIVING (ADL): ADLS_ACUITY_SCORE: 58

## 2025-03-01 NOTE — ED TRIAGE NOTES
Pt arrives from home he was seen Wednesday for hematuria.  Sent home to watch and followup with urology.  Pt was getting better and seemed to have less of a problem during the week.  It has gotten slightly worse today.  Pt denies pain and states he feels he is emptying his bladder.       Triage Assessment (Adult)       Row Name 03/01/25 1005          Triage Assessment    Airway WDL WDL        Respiratory WDL    Respiratory WDL WDL        Skin Circulation/Temperature WDL    Skin Circulation/Temperature WDL WDL        Cardiac WDL    Cardiac WDL WDL        Peripheral/Neurovascular WDL    Peripheral Neurovascular WDL WDL        Cognitive/Neuro/Behavioral WDL    Cognitive/Neuro/Behavioral WDL WDL

## 2025-03-01 NOTE — ED PROVIDER NOTES
Emergency Department Note      History of Present Illness     Chief Complaint   Hematuria      HPI   Issac Jackson is a 69 year old male anticoagulated on Plavix with a history of hypertension, hyperlipidemia, CAD, and ischemic cardiomyopathy who presents with hematuria. Patient seen on 2/27 in ED for same symptoms, discharged with instructions to monitor at home and to hold Plavix. He reports that through the day after previous visit to ED and yesterday, hematuria seemed to be improving. Last night, he urinated without blood a few times. Starting around 4 am today, he began to notice increased blood in his urine again, notes it is not as dark as around time of prior evaluation. He denies dysuria, notes some soreness which he attributes to urinating larger blood clots. Denies flank pain. Denies any fevers, chills, chest pain, or shortness of breath. Denies abdominal pain or back pain. Denies any lower extremity edema. He has an appointment scheduled with urology but it is not for a few weeks. Notes he had similar symptoms in July and had a cystoscopy and blood vessel cauterization at that time.     Independent Historian   None    Review of External Notes   2/27/25 ED note reviewed. Patient seen for hematuria. No signs of obstruction. Instructed to hold plavix.     Past Medical History   Medical History and Problem List   Anemia   CAD  Cataract   Hypertension  Hyperlipidemia   Ischemic cardiomyopathy   Stented coronary artery   ST elevation myocardial infarction involving right coronary artery      Medications   Atorvastatin   Clopidogrel   Lisinopril   Metoprolol succinate ER  Nitroglycerin     Surgical History   Cystoscopy, fulgurate bleeders, evacuate clot(s), combined  Hernia repair   Physical Exam     Patient Vitals for the past 24 hrs:   BP Temp Temp src Pulse Resp SpO2 Height Weight   03/01/25 1137 (!) 190/104 -- -- 58 -- 98 % -- --   03/01/25 1051 (!) 175/96 -- -- -- -- -- -- --   03/01/25 1009 (!) 202/100  "97.7  F (36.5  C) Temporal 64 18 98 % 1.753 m (5' 9\") 91.8 kg (202 lb 6.1 oz)     Physical Exam  General: No acute distress  Head: No obvious trauma to head.  Ears, Nose, Throat:  External ears normal.  Nose normal.  No pharyngeal erythema, swelling or exudate.  Midline uvula. Moist mucus membranes.  Eyes:  Conjunctivae clear.   Neck: Normal range of motion.  Neck supple.   CV: Regular rate and rhythm.  No murmurs.      Respiratory: Effort normal and breath sounds normal.  No wheezing or crackles.   Gastrointestinal: Soft.  No distension. There is no tenderness.  There is no rigidity, no rebound and no guarding.   Musculoskeletal: Normal range of motion.  Non tender extremities to palpations. No lower extremity edema. No CVA tenderness.  Neuro: Alert. Moving all extremities appropriately.  Normal speech.    Skin: Skin is warm and dry.  No rash noted.   Psych: Normal mood and affect. Behavior is normal.      Diagnostics     Lab Results   Labs Ordered and Resulted from Time of ED Arrival to Time of ED Departure   ROUTINE UA WITH MICROSCOPIC REFLEX TO CULTURE - Abnormal       Result Value    Color Urine Light Brown (*)     Appearance Urine Clear      Glucose Urine Negative      Bilirubin Urine Negative      Ketones Urine Negative      Specific Gravity Urine 1.006      Blood Urine Large (*)     pH Urine 5.5      Protein Albumin Urine 30 (*)     Urobilinogen Urine Normal      Nitrite Urine Negative      Leukocyte Esterase Urine Negative      Bacteria Urine Few (*)     RBC Urine 58 (*)     WBC Urine 3     BASIC METABOLIC PANEL - Abnormal    Sodium 140      Potassium 4.7      Chloride 103      Carbon Dioxide (CO2) 28      Anion Gap 9      Urea Nitrogen 18.5      Creatinine 1.34 (*)     GFR Estimate 57 (*)     Calcium 9.8      Glucose 107 (*)    CBC WITH PLATELETS AND DIFFERENTIAL    WBC Count 7.5      RBC Count 4.87      Hemoglobin 14.5      Hematocrit 44.2      MCV 91      MCH 29.8      MCHC 32.8      RDW 12.5      " Platelet Count 300      % Neutrophils 71      % Lymphocytes 18      % Monocytes 8      % Eosinophils 2      % Basophils 1      % Immature Granulocytes 0      NRBCs per 100 WBC 0      Absolute Neutrophils 5.3      Absolute Lymphocytes 1.3      Absolute Monocytes 0.6      Absolute Eosinophils 0.1      Absolute Basophils 0.1      Absolute Immature Granulocytes 0.0      Absolute NRBCs 0.0         Imaging   No orders to display         Independent Interpretation   None    ED Course      Medications Administered   Medications - No data to display    Procedures   Procedures     Discussion of Management   None    ED Course   ED Course as of 03/01/25 1526   Sat Mar 01, 2025   1032 I initially assessed the patient and obtained the history and physical exam.        Additional Documentation  None    Medical Decision Making / Diagnosis     CMS Diagnoses: None    MIPS       None    MDM   Issac Jackson is a 69 year old male presenting with hematuria.  He was recently evaluated for this and stopped his Plavix.  This did seem to improve his hematuria, but this morning it worsened.  Throughout the day it has been slowly improving.  He denies any pain.  CBC, BMP are grossly unremarkable.  Kidney function is unchanged from prior.  UA is negative for UTI.  He is not anemic.  His main concern was that he was bleeding so much that he was going to become anemic.  He is relieved that he is not.  He is hypertensive, but no signs of end organ damage.  He has an appointment with urology in 2 weeks.  He is encouraged to keep that appointment.  Strict return precautions are given and he verbalizes understanding.  He is discharged home in stable condition.    Disposition   The patient was discharged.     Diagnosis     ICD-10-CM    1. Gross hematuria  R31.0            Discharge Medications   Discharge Medication List as of 3/1/2025 11:51 AM            Scribe Disclosure:  I, Manjula Saini, am serving as a scribe at 10:18 AM on 3/1/2025 to document  services personally performed by Kaden Rapp MD based on my observations and the provider's statements to me.        Kaden Rapp MD  03/02/25 0738

## 2025-03-01 NOTE — DISCHARGE INSTRUCTIONS
Your blood level looks great and your kidney function has not changed.  There are no signs of urinary tract infection.  Keep your appointment with urology.  Be sure you are drinking plenty of water to stay hydrated.  Please return the emergency department if you develop any new or concerning symptoms.

## 2025-03-04 ENCOUNTER — LAB (OUTPATIENT)
Dept: LAB | Facility: CLINIC | Age: 70
End: 2025-03-04
Payer: COMMERCIAL

## 2025-03-04 DIAGNOSIS — I25.10 CORONARY ARTERY DISEASE INVOLVING NATIVE CORONARY ARTERY OF NATIVE HEART WITHOUT ANGINA PECTORIS: ICD-10-CM

## 2025-03-04 LAB
ALT SERPL W P-5'-P-CCNC: 28 U/L (ref 0–70)
ANION GAP SERPL CALCULATED.3IONS-SCNC: 10 MMOL/L (ref 7–15)
BUN SERPL-MCNC: 22.7 MG/DL (ref 8–23)
CALCIUM SERPL-MCNC: 9.9 MG/DL (ref 8.8–10.4)
CHLORIDE SERPL-SCNC: 103 MMOL/L (ref 98–107)
CHOLEST SERPL-MCNC: 139 MG/DL
CREAT SERPL-MCNC: 1.35 MG/DL (ref 0.67–1.17)
EGFRCR SERPLBLD CKD-EPI 2021: 56 ML/MIN/1.73M2
ERYTHROCYTE [DISTWIDTH] IN BLOOD BY AUTOMATED COUNT: 12.1 % (ref 10–15)
FASTING STATUS PATIENT QL REPORTED: YES
FASTING STATUS PATIENT QL REPORTED: YES
GLUCOSE SERPL-MCNC: 88 MG/DL (ref 70–99)
HCO3 SERPL-SCNC: 27 MMOL/L (ref 22–29)
HCT VFR BLD AUTO: 44.9 % (ref 40–53)
HDLC SERPL-MCNC: 50 MG/DL
HGB BLD-MCNC: 14.7 G/DL (ref 13.3–17.7)
LDLC SERPL CALC-MCNC: 67 MG/DL
MCH RBC QN AUTO: 30.6 PG (ref 26.5–33)
MCHC RBC AUTO-ENTMCNC: 32.7 G/DL (ref 31.5–36.5)
MCV RBC AUTO: 93 FL (ref 78–100)
NONHDLC SERPL-MCNC: 89 MG/DL
PLATELET # BLD AUTO: 314 10E3/UL (ref 150–450)
POTASSIUM SERPL-SCNC: 4.1 MMOL/L (ref 3.4–5.3)
RBC # BLD AUTO: 4.81 10E6/UL (ref 4.4–5.9)
SODIUM SERPL-SCNC: 140 MMOL/L (ref 135–145)
TRIGL SERPL-MCNC: 112 MG/DL
WBC # BLD AUTO: 8.3 10E3/UL (ref 4–11)

## 2025-03-04 PROCEDURE — 85027 COMPLETE CBC AUTOMATED: CPT

## 2025-03-04 PROCEDURE — 80048 BASIC METABOLIC PNL TOTAL CA: CPT

## 2025-03-04 PROCEDURE — 80061 LIPID PANEL: CPT

## 2025-03-04 PROCEDURE — 84460 ALANINE AMINO (ALT) (SGPT): CPT

## 2025-03-04 PROCEDURE — 36415 COLL VENOUS BLD VENIPUNCTURE: CPT

## 2025-03-22 ENCOUNTER — HEALTH MAINTENANCE LETTER (OUTPATIENT)
Age: 70
End: 2025-03-22

## 2025-04-07 ENCOUNTER — OFFICE VISIT (OUTPATIENT)
Dept: UROLOGY | Facility: CLINIC | Age: 70
End: 2025-04-07
Payer: COMMERCIAL

## 2025-04-07 ENCOUNTER — LAB (OUTPATIENT)
Dept: LAB | Facility: CLINIC | Age: 70
End: 2025-04-07
Payer: COMMERCIAL

## 2025-04-07 VITALS
WEIGHT: 202 LBS | BODY MASS INDEX: 29.92 KG/M2 | HEIGHT: 69 IN | DIASTOLIC BLOOD PRESSURE: 84 MMHG | SYSTOLIC BLOOD PRESSURE: 134 MMHG

## 2025-04-07 DIAGNOSIS — R31.0 GROSS HEMATURIA: Primary | ICD-10-CM

## 2025-04-07 DIAGNOSIS — R31.0 GROSS HEMATURIA: ICD-10-CM

## 2025-04-07 LAB
ALBUMIN UR-MCNC: NEGATIVE MG/DL
APPEARANCE UR: CLEAR
BILIRUB UR QL STRIP: NEGATIVE
COLOR UR AUTO: YELLOW
GLUCOSE UR STRIP-MCNC: NEGATIVE MG/DL
HGB UR QL STRIP: ABNORMAL
KETONES UR STRIP-MCNC: NEGATIVE MG/DL
LEUKOCYTE ESTERASE UR QL STRIP: NEGATIVE
NITRATE UR QL: NEGATIVE
PH UR STRIP: 5.5 [PH] (ref 5–7)
PSA SERPL DL<=0.01 NG/ML-MCNC: 1.54 NG/ML (ref 0–6.5)
SP GR UR STRIP: 1.01 (ref 1–1.03)
UROBILINOGEN UR STRIP-ACNC: 0.2 E.U./DL

## 2025-04-07 PROCEDURE — 99214 OFFICE O/P EST MOD 30 MIN: CPT | Performed by: UROLOGY

## 2025-04-07 PROCEDURE — 36415 COLL VENOUS BLD VENIPUNCTURE: CPT

## 2025-04-07 PROCEDURE — 84153 ASSAY OF PSA TOTAL: CPT

## 2025-04-07 PROCEDURE — 3075F SYST BP GE 130 - 139MM HG: CPT | Performed by: UROLOGY

## 2025-04-07 PROCEDURE — 1126F AMNT PAIN NOTED NONE PRSNT: CPT | Performed by: UROLOGY

## 2025-04-07 PROCEDURE — 81003 URINALYSIS AUTO W/O SCOPE: CPT | Mod: QW | Performed by: UROLOGY

## 2025-04-07 PROCEDURE — 3079F DIAST BP 80-89 MM HG: CPT | Performed by: UROLOGY

## 2025-04-07 ASSESSMENT — PAIN SCALES - GENERAL: PAINLEVEL_OUTOF10: NO PAIN (0)

## 2025-04-07 NOTE — LETTER
4/7/2025       RE: Issac Jackson  [address] [address] [facility] [id number]     Dear Colleague,    Thank you for referring your patient, Issac Jackson, to the [facility] [place] [facility]. Please see a copy of my visit note below.    Office Visit Note  [facility]  [phone]    UROLOGIC DIAGNOSES:   Gross hematuria    CURRENT INTERVENTIONS:   Cystoscopy July 2024    HISTORY:   This is a 69-year-old gentleman who normally takes Plavix and aspirin who was seen in the hospital by Dr. Adame in July when he presented with spontaneous gross hematuria and clot retention.  He underwent cystoscopy and clot evacuation and had a few bleeding areas from the bladder neck fulgurated.  He has done well until the end of February when he had gross hematuria again.  He was still able to urinate fine.  He stopped his Plavix but continue the aspirin and eventually the hematuria resolved.  He says that the hematuria remains resolved at this time.      PAST MEDICAL HISTORY:   Past Medical History:   Diagnosis Date     Benign essential hypertension 09/04/2024     Coronary artery disease involving native coronary artery of native heart without angina pectoris 09/04/2024     Hyperlipidemia LDL goal <70 09/04/2024     Hypertension      Ischemic cardiomyopathy 09/04/2024       PAST SURGICAL HISTORY:   Past Surgical History:   Procedure Laterality Date     CYSTOSCOPY, FULGURATE BLEEDERS, EVACUATE CLOT(S), COMBINED N/A 7/21/2024    Procedure: CYSTOSCOPY, WITH FULGURATION OF HEMORRHAGING BLOOD VESSEL AND THROMBUS REMOVAL;  Surgeon: Noah Adame MD;  Location: [facility] OR     HERNIA REPAIR         FAMILY HISTORY: History reviewed. No pertinent family history.    SOCIAL HISTORY:   Social History     Socioeconomic History     Marital status:      Spouse name: None     Number of children: None     Years of education: None     Highest education level:  "None   Tobacco Use     Smoking status: Former     Types: Cigarettes     Smokeless tobacco: Never   Substance and Sexual Activity     Alcohol use: Yes     Comment: very rarely     Drug use: Never     Sexual activity: Not Currently       Review Of Systems:  Skin: No rash, pruritis, or skin pigmentation  Eyes: No changes in vision  Ears/Nose/Throat: No changes in hearing, no nosebleeds  Respiratory: No shortness of breath, dyspnea on exertion, cough, or hemoptysis  Cardiovascular: No chest pain or palpitations  Gastrointestinal: No diarrhea or constipation. No abdominal pain. No hematochezia  Genitourinary: see HPI  Musculoskeletal: No pain or swelling of joints, normal range of motion  Neurologic: No weakness or tremors  Psychiatric: No recent changes in memory or mood  Hematologic/Lymphatic/Immunologic: No easy bruising or enlarged lymph nodes  Endocrine: No weight gain or loss      PHYSICAL EXAM:    /84   Ht 1.753 m (5' 9\")   Wt 91.6 kg (202 lb)   BMI 29.83 kg/m      Constitutional: Well developed. Conversant and in no acute distress  Eyes: Anicteric sclera, conjunctiva clear, normal extraocular movements  ENT: Normocephalic and atraumatic,   Skin: Warm and dry. No rashes or lesions  Cardiac: No peripheral edema  Back/Flank: Not done  CNS/PNS: Normal musculature and movements, moves all extremities normally  Respiratory: Normal non-labored breathing  Abdomen: Soft nontender and nondistended  Peripheral Vascular: No peripheral edema  Mental Status/Psych: Alert and Oriented x 3. Normal mood and affect    Penis: Not done  Scrotal Skin: Not done  Testicles: Not done  Epididymis: Not done  Digital Rectal Exam:     Cystoscopy: Not done    Imaging: None    Urinalysis: UA RESULTS:  Recent Labs   Lab Test 04/07/25  0924 03/01/25  1019   COLOR Yellow Light Brown*   APPEARANCE Clear Clear   URINEGLC Negative Negative   URINEBILI Negative Negative   URINEKETONE Negative Negative   SG 1.015 1.006   UBLD Small* Large* "   URINEPH 5.5 5.5   PROTEIN Negative 30*   UROBILINOGEN 0.2  --    NITRITE Negative Negative   LEUKEST Negative Negative   RBCU  --  58*   WBCU  --  3       PSA:     Post Void Residual:     Other labs: None today      IMPRESSION:  Gross hematuria    PLAN:  He has had recurrence of his gross hematuria.  He had a noncontrast CT scan and cystoscopy in the hospital in July.  At this time I recommended further workup with a PSA and also with a CT urogram.  He agreed with the plan.  These tests were ordered for him and I will call him when those results are available.  If the above tests come back negative we might consider starting finasteride in hopes of reducing future episodes of gross hematuria.  I also told him that he should continue to hold off on taking Plavix until we have discussed the results of the above tests.        Kaden Alexander M.D.              Again, thank you for allowing me to participate in the care of your patient.      Sincerely,    Kaden Alexander MD

## 2025-04-07 NOTE — PROGRESS NOTES
Office Visit Note  [facility]  [phone]    UROLOGIC DIAGNOSES:   Gross hematuria    CURRENT INTERVENTIONS:   Cystoscopy July 2024    HISTORY:   This is a 69-year-old gentleman who normally takes Plavix and aspirin who was seen in the hospital by Dr. Adame in July when he presented with spontaneous gross hematuria and clot retention.  He underwent cystoscopy and clot evacuation and had a few bleeding areas from the bladder neck fulgurated.  He has done well until the end of February when he had gross hematuria again.  He was still able to urinate fine.  He stopped his Plavix but continue the aspirin and eventually the hematuria resolved.  He says that the hematuria remains resolved at this time.      PAST MEDICAL HISTORY:   Past Medical History:   Diagnosis Date    Benign essential hypertension 09/04/2024    Coronary artery disease involving native coronary artery of native heart without angina pectoris 09/04/2024    Hyperlipidemia LDL goal <70 09/04/2024    Hypertension     Ischemic cardiomyopathy 09/04/2024       PAST SURGICAL HISTORY:   Past Surgical History:   Procedure Laterality Date    CYSTOSCOPY, FULGURATE BLEEDERS, EVACUATE CLOT(S), COMBINED N/A 7/21/2024    Procedure: CYSTOSCOPY, WITH FULGURATION OF HEMORRHAGING BLOOD VESSEL AND THROMBUS REMOVAL;  Surgeon: Noah Adame MD;  Location: [facility] OR    HERNIA REPAIR         FAMILY HISTORY: History reviewed. No pertinent family history.    SOCIAL HISTORY:   Social History     Socioeconomic History    Marital status:      Spouse name: None    Number of children: None    Years of education: None    Highest education level: None   Tobacco Use    Smoking status: Former     Types: Cigarettes    Smokeless tobacco: Never   Substance and Sexual Activity    Alcohol use: Yes     Comment: very rarely    Drug use: Never    Sexual activity: Not Currently       Review Of Systems:  Skin: No rash, pruritis, or skin pigmentation  Eyes: No changes in  "vision  Ears/Nose/Throat: No changes in hearing, no nosebleeds  Respiratory: No shortness of breath, dyspnea on exertion, cough, or hemoptysis  Cardiovascular: No chest pain or palpitations  Gastrointestinal: No diarrhea or constipation. No abdominal pain. No hematochezia  Genitourinary: see HPI  Musculoskeletal: No pain or swelling of joints, normal range of motion  Neurologic: No weakness or tremors  Psychiatric: No recent changes in memory or mood  Hematologic/Lymphatic/Immunologic: No easy bruising or enlarged lymph nodes  Endocrine: No weight gain or loss      PHYSICAL EXAM:    /84   Ht 1.753 m (5' 9\")   Wt 91.6 kg (202 lb)   BMI 29.83 kg/m      Constitutional: Well developed. Conversant and in no acute distress  Eyes: Anicteric sclera, conjunctiva clear, normal extraocular movements  ENT: Normocephalic and atraumatic,   Skin: Warm and dry. No rashes or lesions  Cardiac: No peripheral edema  Back/Flank: Not done  CNS/PNS: Normal musculature and movements, moves all extremities normally  Respiratory: Normal non-labored breathing  Abdomen: Soft nontender and nondistended  Peripheral Vascular: No peripheral edema  Mental Status/Psych: Alert and Oriented x 3. Normal mood and affect    Penis: Not done  Scrotal Skin: Not done  Testicles: Not done  Epididymis: Not done  Digital Rectal Exam:     Cystoscopy: Not done    Imaging: None    Urinalysis: UA RESULTS:  Recent Labs   Lab Test 04/07/25  0924 03/01/25  1019   COLOR Yellow Light Brown*   APPEARANCE Clear Clear   URINEGLC Negative Negative   URINEBILI Negative Negative   URINEKETONE Negative Negative   SG 1.015 1.006   UBLD Small* Large*   URINEPH 5.5 5.5   PROTEIN Negative 30*   UROBILINOGEN 0.2  --    NITRITE Negative Negative   LEUKEST Negative Negative   RBCU  --  58*   WBCU  --  3       PSA:     Post Void Residual:     Other labs: None today      IMPRESSION:  Gross hematuria    PLAN:  He has had recurrence of his gross hematuria.  He had a noncontrast " CT scan and cystoscopy in the hospital in July.  At this time I recommended further workup with a PSA and also with a CT urogram.  He agreed with the plan.  These tests were ordered for him and I will call him when those results are available.  If the above tests come back negative we might consider starting finasteride in hopes of reducing future episodes of gross hematuria.  I also told him that he should continue to hold off on taking Plavix until we have discussed the results of the above tests.        Kaden Alexander M.D.

## 2025-04-07 NOTE — NURSING NOTE
Chief Complaint   Patient presents with    Gross Hematuria      Pt has not seen blood in urine sine 3/7    Trinh Borrero, CMA

## 2025-04-09 ENCOUNTER — OFFICE VISIT (OUTPATIENT)
Dept: CARDIOLOGY | Facility: CLINIC | Age: 70
End: 2025-04-09
Payer: COMMERCIAL

## 2025-04-09 ENCOUNTER — PATIENT OUTREACH (OUTPATIENT)
Dept: ONCOLOGY | Facility: CLINIC | Age: 70
End: 2025-04-09

## 2025-04-09 VITALS
OXYGEN SATURATION: 98 % | SYSTOLIC BLOOD PRESSURE: 193 MMHG | HEART RATE: 71 BPM | BODY MASS INDEX: 30.13 KG/M2 | HEIGHT: 69 IN | DIASTOLIC BLOOD PRESSURE: 99 MMHG | WEIGHT: 203.4 LBS

## 2025-04-09 DIAGNOSIS — I34.0 NONRHEUMATIC MITRAL VALVE REGURGITATION: ICD-10-CM

## 2025-04-09 DIAGNOSIS — I71.40 ABDOMINAL AORTIC ANEURYSM (AAA) WITHOUT RUPTURE, UNSPECIFIED PART: Primary | ICD-10-CM

## 2025-04-09 DIAGNOSIS — I25.10 CORONARY ARTERY DISEASE INVOLVING NATIVE CORONARY ARTERY OF NATIVE HEART WITHOUT ANGINA PECTORIS: ICD-10-CM

## 2025-04-09 DIAGNOSIS — R91.8 PULMONARY NODULES: Primary | ICD-10-CM

## 2025-04-09 DIAGNOSIS — R91.8 PULMONARY NODULES: ICD-10-CM

## 2025-04-09 PROCEDURE — 3080F DIAST BP >= 90 MM HG: CPT | Performed by: INTERNAL MEDICINE

## 2025-04-09 PROCEDURE — 99214 OFFICE O/P EST MOD 30 MIN: CPT | Performed by: INTERNAL MEDICINE

## 2025-04-09 PROCEDURE — 3077F SYST BP >= 140 MM HG: CPT | Performed by: INTERNAL MEDICINE

## 2025-04-09 RX ORDER — CARVEDILOL 12.5 MG/1
12.5 TABLET ORAL 2 TIMES DAILY WITH MEALS
Qty: 180 TABLET | Refills: 3 | Status: SHIPPED | OUTPATIENT
Start: 2025-04-09

## 2025-04-09 RX ORDER — CARVEDILOL 12.5 MG/1
12.5 TABLET ORAL 2 TIMES DAILY WITH MEALS
Qty: 180 TABLET | Refills: 3 | Status: SHIPPED | OUTPATIENT
Start: 2025-04-09 | End: 2025-04-09

## 2025-04-09 NOTE — PATIENT INSTRUCTIONS
April 9, 2025    Thank you for allowing our Cardiology team to participate in your care.     Please note the following changes to your heart treatment plan:     Medication changes:   - change metoprolol to carvedilol 12.5 mg twice daily (monitor for HR, BP), goal BP <130/80 mmHg    Tests to be done:  - TTE in 1 year (heart ultrasound)  - AAA ultrasound in 1 year  - labs in 1 year    Follow up:  - Follow up in 1 year, or sooner as needed      For scheduling, please call [phone]      Please contact our team through [facility] or our Nurse Team Voicemail service [phone] for questions or concerns.     General Clinic [phone]     If you are having a medical emergency, please call [phone].     Sincerely,    Kraig Mac MD, [facility]  Cardiology    [facility] - [place]

## 2025-04-09 NOTE — PROGRESS NOTES
Cardiology Clinic Progress Note:    April 9, 2025   Patient Name: Issac Jackson  Patient MRN: [id number]     Consult indication: CAD    HPI:    I had the opportunity to see patient Issac Jackson in cardiology clinic for a follow up visit.     As you know, patient is a pleasant 69-year-old male with a complex cardiac history notable for recovered ischemic cardiomyopathy, inferior STEMI status post PCI with GOLDEN to RCA (10/2016), staged PCI to mid LAD (11/2016), known  of mid left circumflex and RPL a branch of RCA, hypertension, hyperlipidemia, CKD, who presents for follow up.     Patient previously received care through the [facility].  Reviewed prior medical history.  Copy and pasted below.    Last seen in clinic to establish care 9/4/2024.  At that time patient had been doing well, recently was hospitalized for hematuria.  He is now on aspirin 81 mg daily instead of dual antiplatelet with aspirin and clopidogrel.  He is following up with Urology.    Patient continues to do well from a cardiac perspective, though blood pressure is elevated.  Blood pressures at home are generally in the 130 to 140 mmHg range systolic.  BP today in clinic 193/99 mmHg, patient attributes this to some degree of anxiety about clinic visits in general though he feels fine.  Personally reviewed labs from 3/4/2025.  Patient continues to stay active, exercises regularly, lifts weights, denies any chest pain, chest pressure, normal shortness of breath.  Son recently underwent surgery, he is in the /special forces.         Coronary angiogram (11/08/16)  1. Two vessel CAD        - 80% mid LAD stenosis        - 100% chronic total occlusion of mid LCx. Distal LCx and OM2 fill via collaterals from the RCA.        2. Patent stent in proximal RCA. No significant disease in RPDA. There is a RPLA branch which has 100% chronic total occlusion which fills late via right to right collaterals.        3. Successful PCI to mid  "LAD with Synergy 2.5x28mm GOLDEN (post dilated with 3.0mm NC balloon)        4. LVEDP 13mmHg     Coronary angiogram (10/05/16)  1. Three vessel CAD        - 99% subtotal occlusion of proximal RCA with RASHAD 1 flow (culprit). Mid RCA has an aneurysmal segment.        - 100% chronic total occlusion of mid LCx. Distal LCx fills via collaterals from the RCA. The lesion was probed with Runthrough wire and appears chronic.        - Severe 80% mid LAD stenosis        2. Successful PCI to proximal RCA with Synergy 4.0x20mm GOLDEN (post dilated with 4.5mm NC balloon).         3. LVEDP 19mmHg      Echo 11/23  Summary  1. Sinus bradycardia during study.  2. Normal LV size with normal wall thickness. Visually estimated LVEF  50-55%.  3. Base-mid inferior severe hypokinesis.  4. Normal RV size and systolic function.  5. Moderate LA enlargement.  6. Mild-moderate mitral valve regurgitation.  7. Compared to the prior study from 2/13/2017, the current study reveals   no  significant change.     Assessment and Plan/Recommendations:    # CAD with inferior STEMI 2016 s/p PCI to RCA, LAD, known LCx  and RPLA branch  with collaterals. No angina  # Hematuria, now on aspirin monotherapy instead of DAPT   # HTN, BP elevated, some component of \"white coat hypertension\"  # HL, on statin   # AAA, 3cm  # CKD  # Mild-mod MR TTE 11/2023    -From a cardiac perspective patient is doing well without symptoms concerning for angina or decompensated heart failure, however his blood pressures are elevated.  Will change metoprolol succinate to carvedilol 12.5 mg twice daily.  Advised to monitor for signs/symptoms of bradycardia or hypotension.  Goal BP less than 130/80 mmHg  - TTE in 1 year  - Abdominal aortic ultrasound in 1 year  - Reviewed activity/weight lifting restrictions  - Continue remainder of cardiac regimen of aspirin, atorvastatin, lisinopril, sublingual nitroglycerin as needed  - Labs in 1 year with follow-up at that time, or sooner as " needed    Thank you for allowing our team to participate in the care of Issac Jackson.  Please do not hesitate to call or page me with any questions or concerns.    Sincerely,     Kraig Mac MD, [facility]  Cardiology  Text Page   April 9, 2025    Voice recognition software utilized.       Past Medical History:     Past Medical History:   Diagnosis Date    Benign essential hypertension 09/04/2024    Coronary artery disease involving native coronary artery of native heart without angina pectoris 09/04/2024    Hyperlipidemia LDL goal <70 09/04/2024    Hypertension     Ischemic cardiomyopathy 09/04/2024        Past Surgical History:   Past Surgical History:   Procedure Laterality Date    CYSTOSCOPY, FULGURATE BLEEDERS, EVACUATE CLOT(S), COMBINED N/A 7/21/2024    Procedure: CYSTOSCOPY, WITH FULGURATION OF HEMORRHAGING BLOOD VESSEL AND THROMBUS REMOVAL;  Surgeon: Noah Adame MD;  Location: [facility] OR    HERNIA REPAIR         Medications (outpatient):  Current Outpatient Medications   Medication Sig Dispense Refill    aspirin 81 MG EC tablet Take 1 tablet (81 mg) by mouth daily.      atorvastatin (LIPITOR) 80 MG tablet Take 1 tablet (80 mg) by mouth daily. 90 tablet 3    carvedilol (COREG) 12.5 MG tablet Take 1 tablet (12.5 mg) by mouth 2 times daily (with meals). 180 tablet 3    lisinopril (ZESTRIL) 40 MG tablet Take 1 tablet (40 mg) by mouth daily. 90 tablet 3    nitroGLYcerin (NITROSTAT) 0.4 MG sublingual tablet For chest pain place 1 tablet under the tongue every 5 minutes for 3 doses. If symptoms persist 5 minutes after 1st dose call 911. 25 tablet 3       Allergies:  No Known Allergies    Social History:   History   Drug Use Unknown      History   Smoking Status    Former    Types: Cigarettes   Smokeless Tobacco    Never     Social History    Substance and Sexual Activity      Alcohol use: Yes        Comment: very rarely       Family History:  History reviewed. No pertinent family history.    Review of  "Systems:   A complete review of systems was negative except as mentioned in the History of Present Illness.     Objective & Physical Exam:  BP (!) 193/99   Pulse 71   Ht 1.753 m (5' 9\")   Wt 92.3 kg (203 lb 6.4 oz)   SpO2 98%   BMI 30.04 kg/m    Wt Readings from Last 2 Encounters:   04/09/25 92.3 kg (203 lb 6.4 oz)   04/07/25 91.6 kg (202 lb)     Body mass index is 30.04 kg/m .   Body surface area is 2.12 meters squared.    Constitutional: appears stated age, in no apparent distress, appears to be well nourished  Pulmonary: clear to auscultation bilaterally  Cardiovascular: JVP normal, regular rate, regular rhythm, 1/6 systolic murmur, no lower extremity edema  Gastrointestinal: no guarding, non-rigid   Neurologic: awake, alert, moves all extremities  Skin: no jaundice, warm on limited exam    Data reviewed:  Lab Results   Component Value Date    WBC 8.3 03/04/2025    WBC 12.5 (H) 10/05/2016    RBC 4.81 03/04/2025    RBC 4.96 10/05/2016    HGB 14.7 03/04/2025    HGB 15.1 10/05/2016    HCT 44.9 03/04/2025    HCT 45.1 10/05/2016    MCV 93 03/04/2025    MCV 91 10/05/2016    MCH 30.6 03/04/2025    MCH 30.4 10/05/2016    MCHC 32.7 03/04/2025    MCHC 33.5 10/05/2016    RDW 12.1 03/04/2025    RDW 13.0 10/05/2016     03/04/2025     10/05/2016     Sodium   Date Value Ref Range Status   03/04/2025 140 135 - 145 mmol/L Final   10/05/2016 138 133 - 144 mmol/L Final     Potassium   Date Value Ref Range Status   03/04/2025 4.1 3.4 - 5.3 mmol/L Final   10/05/2016 3.3 (L) 3.4 - 5.3 mmol/L Final     Potassium POCT   Date Value Ref Range Status   07/20/2024 4.3 3.4 - 5.3 mmol/L Final     Chloride   Date Value Ref Range Status   03/04/2025 103 98 - 107 mmol/L Final   10/05/2016 102 94 - 109 mmol/L Final     Chloride POCT   Date Value Ref Range Status   07/20/2024 106 94 - 109 mmol/L Final     Carbon Dioxide   Date Value Ref Range Status   10/05/2016 28 20 - 32 mmol/L Final     Carbon Dioxide (CO2)   Date Value Ref " "Range Status   03/04/2025 27 22 - 29 mmol/L Final     Anion Gap   Date Value Ref Range Status   03/04/2025 10 7 - 15 mmol/L Final   10/05/2016 8 3 - 14 mmol/L Final     Glucose   Date Value Ref Range Status   03/04/2025 88 70 - 99 mg/dL Final   10/05/2016 124 (H) 70 - 99 mg/dL Final     GLUCOSE BY METER POCT   Date Value Ref Range Status   07/20/2024 119 (H) 70 - 99 mg/dL Final     Comment:     Dr/RN Notified     Glucose Whole Blood POCT   Date Value Ref Range Status   07/20/2024 125 (H) 70 - 99 mg/dL Final     Urea Nitrogen   Date Value Ref Range Status   03/04/2025 22.7 8.0 - 23.0 mg/dL Final   10/05/2016 20 7 - 30 mg/dL Final     UREA NITROGEN POCT   Date Value Ref Range Status   07/20/2024 20 7 - 30 mg/dL Final     Creatinine   Date Value Ref Range Status   03/04/2025 1.35 (H) 0.67 - 1.17 mg/dL Final   10/05/2016 1.63 (H) 0.66 - 1.25 mg/dL Final     GFR Estimate   Date Value Ref Range Status   03/04/2025 56 (L) >60 mL/min/1.73m2 Final     Comment:     eGFR calculated using 2021 CKD-EPI equation.   10/05/2016 43 (L) >60 mL/min/1.7m2 Final     Comment:     Non  GFR Calc     Calcium   Date Value Ref Range Status   03/04/2025 9.9 8.8 - 10.4 mg/dL Final   10/05/2016 8.2 (L) 8.5 - 10.1 mg/dL Final     Bilirubin Total   Date Value Ref Range Status   07/21/2024 0.8 <=1.2 mg/dL Final     Alkaline Phosphatase   Date Value Ref Range Status   07/21/2024 103 40 - 150 U/L Final     ALT   Date Value Ref Range Status   03/04/2025 28 0 - 70 U/L Final     AST   Date Value Ref Range Status   07/21/2024 20 0 - 45 U/L Final     Recent Labs   Lab Test 03/04/25  1354   CHOL 139   HDL 50   LDL 67   TRIG 112      No results found for: \"A1C\"     No results found for this or any previous visit (from the past 4320 hours).     "

## 2025-04-09 NOTE — PROGRESS NOTES
New IP (Interventional Pulmonology) referral rec'd.  Chart reviewed.       New Patient: Interventional Pulmonary (Lung nodule) Nurse Navigator Note    Referring provider:   Kraig Mac MD [facility] [phone]     Referred to (specialty): Interventional Pulmonary (Lung nodule)    Requested provider (if applicable): n/a    Date Referral Received: 4/9/2025    Evaluation for:  lung nodules    Clinical History (per Nurse review of records provided):    **BOOK MARKED**    EXAM: CT ABDOMEN AND PELVIS WITHOUT CONTRAST  LOCATION: [facility]  DATE: 07/20/2024     INDICATION: Gross hematuria.  COMPARISON: CT aortogram (CTA chest, abdomen and pelvis) with contrast 11/09/2023.  TECHNIQUE: CT scan of the abdomen and pelvis was performed without IV contrast. Multiplanar reformats were obtained. Dose reduction techniques were used.  CONTRAST: None.     FINDINGS:   LOWER CHEST: Interstitial nodular changes in the lower lungs, more on the right. Emphysematous changes. No pleural effusion on either side. Normal cardiac size. Coronary artery calcifications. No pericardial effusion. No significant change.     HEPATOBILIARY: No discrete lesion, calcified gallstones or biliary dilatation.     PANCREAS: Normal.     SPLEEN: Calcified granulomas in the spleen.     ADRENAL GLANDS: Normal.     KIDNEYS/BLADDER: No urinary tract calculi. Both kidneys are negative for hydronephrosis and hydroureter. Dependent clot material within the urinary bladder. Mild prostatomegaly.     BOWEL: Formed stool material and scattered gas within normal caliber colon without mechanical obstruction or free gas. Distal colonic diverticulosis, more apparent at the rectosigmoid, without acute inflammation or secondary complications. Normal   appendix. Small hiatal hernia.     LYMPH NODES: No suspicious abdominopelvic adenopathy.     VASCULATURE: Atherosclerotic and mildly aneurysmal distal abdominal aorta  measuring 3.0 x 3.1 cm (image 103, series 3). Normal caliber IVC.     PELVIC ORGANS: Atherosclerotic changes. No adenopathy or free fluid. Normal appendix.     MUSCULOSKELETAL: Tiny fat-containing ventral umbilical hernia with a focal mineralization, unchanged. Degenerative changes involving the spine and joints of the pelvis.                                                                      IMPRESSION:   1.  Dependent clot material within the urinary bladder. Prostatomegaly. No urinary tract calculi, hydronephrosis or hydroureter on either side.     2.  No mechanical bowel obstruction or free gas. Distal colonic diverticulosis without acute inflammation or secondary complications. Normal appendix. Small hiatal hernia. Tiny fat-containing ventral umbilical hernia containing a focal mineralization,   unchanged.     3.  Atherosclerotic and mildly aneurysmal distal abdominal aorta. Normal cardiac size. Coronary artery calcifications. Evidence of remote granulomatous disease.     Records Location: [facility]     Records Needed: none    Additional testing needed prior to consult: CT Chest

## 2025-04-09 NOTE — LETTER
4/9/2025    Physician No Ref-Primary  No address on file    RE: Issac Jackson       Dear Colleague,     I had the [facility] of [facility].      Cardiology Clinic Progress Note:    April 9, 2025   Patient Name: Issac Jackson  Patient MRN: [id number]     Consult indication: CAD    HPI:    I had the opportunity to see patient Issac Jackson in cardiology clinic for a follow up visit.     As you know, patient is a pleasant 69-year-old male with a complex cardiac history notable for recovered ischemic cardiomyopathy, inferior STEMI status post PCI with GOLDEN to RCA (10/2016), staged PCI to mid LAD (11/2016), known  of mid left circumflex and RPL a branch of RCA, hypertension, hyperlipidemia, CKD, who presents for follow up.     Patient previously received care through the [facility].  Reviewed prior medical history.  Copy and pasted below.    Last seen in clinic to establish care 9/4/2024.  At that time patient had been doing well, recently was hospitalized for hematuria.  He is now on aspirin 81 mg daily instead of dual antiplatelet with aspirin and clopidogrel.  He is following up with Urology.    Patient continues to do well from a cardiac perspective, though blood pressure is elevated.  Blood pressures at home are generally in the 130 to 140 mmHg range systolic.  BP today in clinic 193/99 mmHg, patient attributes this to some degree of anxiety about clinic visits in general though he feels fine.  Personally reviewed labs from 3/4/2025.  Patient continues to stay active, exercises regularly, lifts weights, denies any chest pain, chest pressure, normal shortness of breath.  Son recently underwent surgery, he is in the /special forces.         Coronary angiogram (11/08/16)  1. Two vessel CAD        - 80% mid LAD stenosis        - 100% chronic total occlusion of mid LCx. Distal LCx and OM2 fill via collaterals from the RCA.        2. Patent stent in  "proximal RCA. No significant disease in RPDA. There is a RPLA branch which has 100% chronic total occlusion which fills late via right to right collaterals.        3. Successful PCI to mid LAD with Synergy 2.5x28mm GOLDEN (post dilated with 3.0mm NC balloon)        4. LVEDP 13mmHg     Coronary angiogram (10/05/16)  1. Three vessel CAD        - 99% subtotal occlusion of proximal RCA with RASHAD 1 flow (culprit). Mid RCA has an aneurysmal segment.        - 100% chronic total occlusion of mid LCx. Distal LCx fills via collaterals from the RCA. The lesion was probed with Runthrough wire and appears chronic.        - Severe 80% mid LAD stenosis        2. Successful PCI to proximal RCA with Synergy 4.0x20mm GOLDEN (post dilated with 4.5mm NC balloon).         3. LVEDP 19mmHg      Echo 11/23  Summary  1. Sinus bradycardia during study.  2. Normal LV size with normal wall thickness. Visually estimated LVEF  50-55%.  3. Base-mid inferior severe hypokinesis.  4. Normal RV size and systolic function.  5. Moderate LA enlargement.  6. Mild-moderate mitral valve regurgitation.  7. Compared to the prior study from 2/13/2017, the current study reveals   no  significant change.     Assessment and Plan/Recommendations:    # CAD with inferior STEMI 2016 s/p PCI to RCA, LAD, known LCx  and RPLA branch  with collaterals. No angina  # Hematuria, now on aspirin monotherapy instead of DAPT   # HTN, BP elevated, some component of \"white coat hypertension\"  # HL, on statin   # AAA, 3cm  # CKD  # Mild-mod MR TTE 11/2023    -From a cardiac perspective patient is doing well without symptoms concerning for angina or decompensated heart failure, however his blood pressures are elevated.  Will change metoprolol succinate to carvedilol 12.5 mg twice daily.  Advised to monitor for signs/symptoms of bradycardia or hypotension.  Goal BP less than 130/80 mmHg  - TTE in 1 year  - Abdominal aortic ultrasound in 1 year  - Reviewed activity/weight lifting " restrictions  - Continue remainder of cardiac regimen of aspirin, atorvastatin, lisinopril, sublingual nitroglycerin as needed  - Labs in 1 year with follow-up at that time, or sooner as needed    Thank you for allowing our team to participate in the care of Issac Jackson.  Please do not hesitate to call or page me with any questions or concerns.    Sincerely,     Kraig Mac MD, [facility]  Cardiology  Text Page   April 9, 2025    Voice recognition software utilized.       Past Medical History:     Past Medical History:   Diagnosis Date     Benign essential hypertension 09/04/2024     Coronary artery disease involving native coronary artery of native heart without angina pectoris 09/04/2024     Hyperlipidemia LDL goal <70 09/04/2024     Hypertension      Ischemic cardiomyopathy 09/04/2024        Past Surgical History:   Past Surgical History:   Procedure Laterality Date     CYSTOSCOPY, FULGURATE BLEEDERS, EVACUATE CLOT(S), COMBINED N/A 7/21/2024    Procedure: CYSTOSCOPY, WITH FULGURATION OF HEMORRHAGING BLOOD VESSEL AND THROMBUS REMOVAL;  Surgeon: Noah Adame MD;  Location: [facility] OR     HERNIA REPAIR         Medications (outpatient):  Current Outpatient Medications   Medication Sig Dispense Refill     aspirin 81 MG EC tablet Take 1 tablet (81 mg) by mouth daily.       atorvastatin (LIPITOR) 80 MG tablet Take 1 tablet (80 mg) by mouth daily. 90 tablet 3     carvedilol (COREG) 12.5 MG tablet Take 1 tablet (12.5 mg) by mouth 2 times daily (with meals). 180 tablet 3     lisinopril (ZESTRIL) 40 MG tablet Take 1 tablet (40 mg) by mouth daily. 90 tablet 3     nitroGLYcerin (NITROSTAT) 0.4 MG sublingual tablet For chest pain place 1 tablet under the tongue every 5 minutes for 3 doses. If symptoms persist 5 minutes after 1st dose call 911. 25 tablet 3       Allergies:  No Known Allergies    Social History:   History   Drug Use Unknown      History   Smoking Status     Former     Types: Cigarettes   Smokeless  "Tobacco     Never     Social History    Substance and Sexual Activity      Alcohol use: Yes        Comment: very rarely       Family History:  History reviewed. No pertinent family history.    Review of Systems:   A complete review of systems was negative except as mentioned in the History of Present Illness.     Objective & Physical Exam:  BP (!) 193/99   Pulse 71   Ht 1.753 m (5' 9\")   Wt 92.3 kg (203 lb 6.4 oz)   SpO2 98%   BMI 30.04 kg/m    Wt Readings from Last 2 Encounters:   04/09/25 92.3 kg (203 lb 6.4 oz)   04/07/25 91.6 kg (202 lb)     Body mass index is 30.04 kg/m .   Body surface area is 2.12 meters squared.    Constitutional: appears stated age, in no apparent distress, appears to be well nourished  Pulmonary: clear to auscultation bilaterally  Cardiovascular: JVP normal, regular rate, regular rhythm, 1/6 systolic murmur, no lower extremity edema  Gastrointestinal: no guarding, non-rigid   Neurologic: awake, alert, moves all extremities  Skin: no jaundice, warm on limited exam    Data reviewed:  Lab Results   Component Value Date    WBC 8.3 03/04/2025    WBC 12.5 (H) 10/05/2016    RBC 4.81 03/04/2025    RBC 4.96 10/05/2016    HGB 14.7 03/04/2025    HGB 15.1 10/05/2016    HCT 44.9 03/04/2025    HCT 45.1 10/05/2016    MCV 93 03/04/2025    MCV 91 10/05/2016    MCH 30.6 03/04/2025    MCH 30.4 10/05/2016    MCHC 32.7 03/04/2025    MCHC 33.5 10/05/2016    RDW 12.1 03/04/2025    RDW 13.0 10/05/2016     03/04/2025     10/05/2016     Sodium   Date Value Ref Range Status   03/04/2025 140 135 - 145 mmol/L Final   10/05/2016 138 133 - 144 mmol/L Final     Potassium   Date Value Ref Range Status   03/04/2025 4.1 3.4 - 5.3 mmol/L Final   10/05/2016 3.3 (L) 3.4 - 5.3 mmol/L Final     Potassium POCT   Date Value Ref Range Status   07/20/2024 4.3 3.4 - 5.3 mmol/L Final     Chloride   Date Value Ref Range Status   03/04/2025 103 98 - 107 mmol/L Final   10/05/2016 102 94 - 109 mmol/L Final     Chloride " "POCT   Date Value Ref Range Status   07/20/2024 106 94 - 109 mmol/L Final     Carbon Dioxide   Date Value Ref Range Status   10/05/2016 28 20 - 32 mmol/L Final     Carbon Dioxide (CO2)   Date Value Ref Range Status   03/04/2025 27 22 - 29 mmol/L Final     Anion Gap   Date Value Ref Range Status   03/04/2025 10 7 - 15 mmol/L Final   10/05/2016 8 3 - 14 mmol/L Final     Glucose   Date Value Ref Range Status   03/04/2025 88 70 - 99 mg/dL Final   10/05/2016 124 (H) 70 - 99 mg/dL Final     GLUCOSE BY METER POCT   Date Value Ref Range Status   07/20/2024 119 (H) 70 - 99 mg/dL Final     Comment:     Dr/RN Notified     Glucose Whole Blood POCT   Date Value Ref Range Status   07/20/2024 125 (H) 70 - 99 mg/dL Final     Urea Nitrogen   Date Value Ref Range Status   03/04/2025 22.7 8.0 - 23.0 mg/dL Final   10/05/2016 20 7 - 30 mg/dL Final     UREA NITROGEN POCT   Date Value Ref Range Status   07/20/2024 20 7 - 30 mg/dL Final     Creatinine   Date Value Ref Range Status   03/04/2025 1.35 (H) 0.67 - 1.17 mg/dL Final   10/05/2016 1.63 (H) 0.66 - 1.25 mg/dL Final     GFR Estimate   Date Value Ref Range Status   03/04/2025 56 (L) >60 mL/min/1.73m2 Final     Comment:     eGFR calculated using 2021 CKD-EPI equation.   10/05/2016 43 (L) >60 mL/min/1.7m2 Final     Comment:     Non  GFR Calc     Calcium   Date Value Ref Range Status   03/04/2025 9.9 8.8 - 10.4 mg/dL Final   10/05/2016 8.2 (L) 8.5 - 10.1 mg/dL Final     Bilirubin Total   Date Value Ref Range Status   07/21/2024 0.8 <=1.2 mg/dL Final     Alkaline Phosphatase   Date Value Ref Range Status   07/21/2024 103 40 - 150 U/L Final     ALT   Date Value Ref Range Status   03/04/2025 28 0 - 70 U/L Final     AST   Date Value Ref Range Status   07/21/2024 20 0 - 45 U/L Final     Recent Labs   Lab Test 03/04/25  1354   CHOL 139   HDL 50   LDL 67   TRIG 112      No results found for: \"A1C\"     No results found for this or any previous visit (from the past 4320 hours). "     Thank you for allowing me to participate in the care of your patient.      Sincerely,     Kraig Mac MD     [facility]  cc:   Kraig Mac MD  [address] [place] [address]  [place] [address]

## 2025-04-22 NOTE — TELEPHONE ENCOUNTER
RECORDS STATUS - ALL OTHER DIAGNOSIS      RECORDS RECEIVED FROM: [facility]   NOTES STATUS DETAILS   OFFICE NOTE from referring provider Epic 4/9/25: Dr. Kraig Mac   OFFICE NOTE from medical oncologist     OFFICE NOTE from other specialist     DISCHARGE SUMMARY from hospital     DISCHARGE REPORT from the ER     OPERATIVE REPORT     MEDICATION LIST [facility]    LABS     PATHOLOGY REPORTS     ANYTHING RELATED TO DIAGNOSIS Epic Most recent 4/7/25   PATHOLOGY FED[employer] TRACKING   Tracking #:   GENONOMIC TESTING     TYPE:     IMAGING (NEED IMAGES & REPORT)     CT SCANS PACS 7/20/24: CT Abd Pel   MRI     XRAYS     ULTRASOUND     PET     IMAGE DISC FED[employer] TRACKING   Tracking #:

## 2025-04-29 ENCOUNTER — DOCUMENTATION ONLY (OUTPATIENT)
Dept: PULMONOLOGY | Facility: CLINIC | Age: 70
End: 2025-04-29
Payer: COMMERCIAL

## 2025-04-29 PROBLEM — I25.5 ISCHEMIC CARDIOMYOPATHY: Status: ACTIVE | Noted: 2017-01-18

## 2025-04-29 PROBLEM — E78.5 DYSLIPIDEMIA: Status: ACTIVE | Noted: 2017-01-18

## 2025-04-29 PROBLEM — I25.10 ATHEROSCLEROSIS OF NATIVE CORONARY ARTERY OF NATIVE HEART WITHOUT ANGINA PECTORIS: Status: ACTIVE | Noted: 2017-01-18

## 2025-04-29 RX ORDER — DIAZEPAM 10 MG/1
TABLET ORAL
COMMUNITY
Start: 2024-06-10 | End: 2025-04-30

## 2025-04-29 RX ORDER — COVID-19 VACCINE, MRNA 50 UG/.5ML
INJECTION, SUSPENSION INTRAMUSCULAR
COMMUNITY
Start: 2024-09-27 | End: 2025-04-30

## 2025-04-29 RX ORDER — INFLUENZA A VIRUS A/VICTORIA/4897/2022 IVR-238 (H1N1) ANTIGEN (FORMALDEHYDE INACTIVATED), INFLUENZA A VIRUS A/CALIFORNIA/122/2022 SAN-022 (H3N2) ANTIGEN (FORMALDEHYDE INACTIVATED), AND INFLUENZA B VIRUS B/MICHIGAN/01/2021 ANTIGEN (FORMALDEHYDE INACTIVATED) 60; 60; 60 UG/.5ML; UG/.5ML; UG/.5ML
INJECTION, SUSPENSION INTRAMUSCULAR
COMMUNITY
Start: 2024-09-27 | End: 2025-04-30

## 2025-04-29 NOTE — PROGRESS NOTES
Pre-visit planning and chart review completed.     NEW patient appointment:    5/6 with Dr. Paulo Hudson  5/1 CT chest wo contrast    - On Aspirin  - Former smoker.    CE updated. Medications, allergies, problem list, and immunizations reconciled.

## 2025-05-01 ENCOUNTER — HOSPITAL ENCOUNTER (OUTPATIENT)
Dept: CT IMAGING | Facility: CLINIC | Age: 70
Discharge: HOME | End: 2025-05-01
Attending: INTERNAL MEDICINE
Payer: COMMERCIAL

## 2025-05-01 ENCOUNTER — HOSPITAL ENCOUNTER (OUTPATIENT)
Dept: CT IMAGING | Facility: CLINIC | Age: 70
Discharge: HOME | End: 2025-05-01
Attending: UROLOGY
Payer: COMMERCIAL

## 2025-05-01 DIAGNOSIS — R91.8 PULMONARY NODULES: ICD-10-CM

## 2025-05-01 DIAGNOSIS — R31.0 GROSS HEMATURIA: ICD-10-CM

## 2025-05-01 LAB
CREAT BLD-MCNC: 1.5 MG/DL (ref 0.7–1.2)
EGFRCR SERPLBLD CKD-EPI 2021: 50 ML/MIN/1.73M2

## 2025-05-01 PROCEDURE — 74178 CT ABD&PLV WO CNTR FLWD CNTR: CPT

## 2025-05-01 PROCEDURE — 71250 CT THORAX DX C-: CPT

## 2025-05-01 PROCEDURE — 250N000011 HC RX IP 250 OP 636: Performed by: UROLOGY

## 2025-05-01 PROCEDURE — 250N000009 HC RX 250: Performed by: UROLOGY

## 2025-05-01 PROCEDURE — 82565 ASSAY OF CREATININE: CPT

## 2025-05-01 RX ORDER — IOPAMIDOL 755 MG/ML
500 INJECTION, SOLUTION INTRAVASCULAR ONCE
Status: COMPLETED | OUTPATIENT
Start: 2025-05-01 | End: 2025-05-01

## 2025-05-01 RX ADMIN — IOPAMIDOL 100 ML: 755 INJECTION, SOLUTION INTRAVENOUS at 13:56

## 2025-05-01 RX ADMIN — SODIUM CHLORIDE 90 ML: 9 INJECTION, SOLUTION INTRAVENOUS at 13:56

## 2025-05-06 ENCOUNTER — VIRTUAL VISIT (OUTPATIENT)
Dept: PULMONOLOGY | Facility: CLINIC | Age: 70
End: 2025-05-06
Attending: INTERNAL MEDICINE
Payer: COMMERCIAL

## 2025-05-06 ENCOUNTER — PRE VISIT (OUTPATIENT)
Dept: PULMONOLOGY | Facility: CLINIC | Age: 70
End: 2025-05-06
Payer: COMMERCIAL

## 2025-05-06 VITALS — WEIGHT: 200 LBS | BODY MASS INDEX: 29.62 KG/M2 | HEIGHT: 69 IN

## 2025-05-06 DIAGNOSIS — R31.0 GROSS HEMATURIA: Primary | ICD-10-CM

## 2025-05-06 DIAGNOSIS — R91.8 PULMONARY NODULES: ICD-10-CM

## 2025-05-06 PROCEDURE — 1126F AMNT PAIN NOTED NONE PRSNT: CPT | Mod: 95 | Performed by: INTERNAL MEDICINE

## 2025-05-06 PROCEDURE — 98002 SYNCH AUDIO-VIDEO NEW MOD 45: CPT | Performed by: INTERNAL MEDICINE

## 2025-05-06 ASSESSMENT — PAIN SCALES - GENERAL: PAINLEVEL_OUTOF10: NO PAIN (0)

## 2025-05-06 NOTE — LETTER
5/6/2025       RE: Issac Jackson  [address] [facility] [address]     Dear Colleague,    Thank you for referring your patient, Issac Jackson, [facility]. Please see a copy of my visit note below.    Virtual Visit Details    Type of service:  Video Visit   Video Start Time: 8:13 AM  Video End Time: 0820    Originating Location (pt. Location): Home    Distant Location (provider location):  On-site  Platform used for Video Visit: [facility]    LUNG NODULE & INTERVENTIONAL PULMONARY CLINIC  CLINICS & SURGERY CENTER, [facility]     Issac Jackson MRN# [id number]   Age: 69 year old YOB: 1955       Requesting Physician: Kraig Mac MD  [address]  [place] [address]       Assessment and Plan:    1. Established solitary pulmonary lung nodule(s). Given the characteristics on current/previous imaging and risk factors; I would classify this to be very low risk. Given the size, stability over time and his lack of recent smoking I don't think that further follow up is necessary.    2.  His history of gross hematuria is noted.  Given this nodule small size and stability it is not related to any urothelial concern and not consistent with prostate bladder cancer.    No further follow-up recommended but we are happy to help in the future if any concerns arise.         History:     Issac Jackson is a 69 year old male with sig h/o for tobacco use who is here for evaluation/followup of lung nodule(s).  These were noted incidentally on a CT scan in late November.  There are some discussion for follow-up with his cardiologist and he had a repeat CT scan.  Is no active pulmonary symptoms.  No history of COPD or chronic bronchitis.    He quit smoking more than 20 years ago.  No particular workplace exposures or family history.      - My interpretation of the images  "relevant for this visit includes: Small stable lung nodule no other concerning appearance  - My interpretation of the PFT's relevant for this visit includes: None            Past Medical History:      Past Medical History:   Diagnosis Date     Benign essential hypertension 09/04/2024     Coronary artery disease involving native coronary artery of native heart without angina pectoris 09/04/2024     Hyperlipidemia LDL goal <70 09/04/2024     Hypertension      Ischemic cardiomyopathy 1/18/2017           Past Surgical History:      Past Surgical History:   Procedure Laterality Date     CYSTOSCOPY, FULGURATE BLEEDERS, EVACUATE CLOT(S), COMBINED N/A 7/21/2024    Procedure: CYSTOSCOPY, WITH FULGURATION OF HEMORRHAGING BLOOD VESSEL AND THROMBUS REMOVAL;  Surgeon: Noah Adame MD;  Location: [facility] OR     HERNIA REPAIR            Social History:     Social History     Tobacco Use     Smoking status: Former     Types: Cigarettes     Smokeless tobacco: Never   Substance Use Topics     Alcohol use: Yes     Comment: very rarely          Family History:   No family history on file.        Allergies:    No Known Allergies       Medications:     Current Outpatient Medications   Medication Sig Dispense Refill     aspirin 81 MG EC tablet Take 1 tablet (81 mg) by mouth daily.       atorvastatin (LIPITOR) 80 MG tablet Take 1 tablet (80 mg) by mouth daily. 90 tablet 3     carvedilol (COREG) 12.5 MG tablet Take 1 tablet (12.5 mg) by mouth 2 times daily (with meals). 180 tablet 3     lisinopril (ZESTRIL) 40 MG tablet Take 1 tablet (40 mg) by mouth daily. 90 tablet 3     nitroGLYcerin (NITROSTAT) 0.4 MG sublingual tablet For chest pain place 1 tablet under the tongue every 5 minutes for 3 doses. If symptoms persist 5 minutes after 1st dose call 911. 25 tablet 3     No current facility-administered medications for this visit.          Review of Systems:     See HPI         Physical Exam:   Ht 1.753 m (5' 9\")   Wt 90.7 kg (200 lb)   BMI " 29.53 kg/m      Constitutional - looks well, in no apparent distress  Eyes - no redness or discharge  Respiratory -breathing appears comfortable. No wheeze or rhonchi.   Skin - No appreciable discoloration or lesions (very limited exam)  Neurological - No apparent tremors. Speech fluent and articlate  Psychiatric - no signs of delirium or anxiety          Current Laboratory Data:   All laboratory and imaging data reviewed.    No results found for this or any previous visit (from the past 24 hours).               Again, thank you for allowing me to participate in the care of your patient.      Sincerely,    Paulo Hudson MD

## 2025-05-06 NOTE — PROGRESS NOTES
Virtual Visit Details    Type of service:  Video Visit   Video Start Time: 8:13 AM  Video End Time: 0820    Originating Location (pt. Location): Home    Distant Location (provider location):  On-site  Platform used for Video Visit: [facility]    LUNG NODULE & INTERVENTIONAL PULMONARY CLINIC  CLINICS & SURGERY CENTER, [facility]     Issac Jackson MRN# [id number]   Age: 69 year old YOB: 1955       Requesting Physician: Kraig Mac MD  [address] [facility] [address]  [place] [address]       Assessment and Plan:    1. Established solitary pulmonary lung nodule(s). Given the characteristics on current/previous imaging and risk factors; I would classify this to be very low risk. Given the size, stability over time and his lack of recent smoking I don't think that further follow up is necessary.    2.  His history of gross hematuria is noted.  Given this nodule small size and stability it is not related to any urothelial concern and not consistent with prostate bladder cancer.    No further follow-up recommended but we are happy to help in the future if any concerns arise.         History:     Issac Jackson is a 69 year old male with sig h/o for tobacco use who is here for evaluation/followup of lung nodule(s).  These were noted incidentally on a CT scan in late November.  There are some discussion for follow-up with his cardiologist and he had a repeat CT scan.  Is no active pulmonary symptoms.  No history of COPD or chronic bronchitis.    He quit smoking more than 20 years ago.  No particular workplace exposures or family history.      - My interpretation of the images relevant for this visit includes: Small stable lung nodule no other concerning appearance  - My interpretation of the PFT's relevant for this visit includes: None            Past Medical History:      Past Medical History:   Diagnosis Date    Benign essential hypertension 09/04/2024    Coronary artery  "disease involving native coronary artery of native heart without angina pectoris 09/04/2024    Hyperlipidemia LDL goal <70 09/04/2024    Hypertension     Ischemic cardiomyopathy 1/18/2017           Past Surgical History:      Past Surgical History:   Procedure Laterality Date    CYSTOSCOPY, FULGURATE BLEEDERS, EVACUATE CLOT(S), COMBINED N/A 7/21/2024    Procedure: CYSTOSCOPY, WITH FULGURATION OF HEMORRHAGING BLOOD VESSEL AND THROMBUS REMOVAL;  Surgeon: Noah Adame MD;  Location: [facility] OR    HERNIA REPAIR            Social History:     Social History     Tobacco Use    Smoking status: Former     Types: Cigarettes    Smokeless tobacco: Never   Substance Use Topics    Alcohol use: Yes     Comment: very rarely          Family History:   No family history on file.        Allergies:    No Known Allergies       Medications:     Current Outpatient Medications   Medication Sig Dispense Refill    aspirin 81 MG EC tablet Take 1 tablet (81 mg) by mouth daily.      atorvastatin (LIPITOR) 80 MG tablet Take 1 tablet (80 mg) by mouth daily. 90 tablet 3    carvedilol (COREG) 12.5 MG tablet Take 1 tablet (12.5 mg) by mouth 2 times daily (with meals). 180 tablet 3    lisinopril (ZESTRIL) 40 MG tablet Take 1 tablet (40 mg) by mouth daily. 90 tablet 3    nitroGLYcerin (NITROSTAT) 0.4 MG sublingual tablet For chest pain place 1 tablet under the tongue every 5 minutes for 3 doses. If symptoms persist 5 minutes after 1st dose call 911. 25 tablet 3     No current facility-administered medications for this visit.          Review of Systems:     See HPI         Physical Exam:   Ht 1.753 m (5' 9\")   Wt 90.7 kg (200 lb)   BMI 29.53 kg/m      Constitutional - looks well, in no apparent distress  Eyes - no redness or discharge  Respiratory -breathing appears comfortable. No wheeze or rhonchi.   Skin - No appreciable discoloration or lesions (very limited exam)  Neurological - No apparent tremors. Speech fluent and articlate  Psychiatric - no " signs of delirium or anxiety          Current Laboratory Data:   All laboratory and imaging data reviewed.    No results found for this or any previous visit (from the past 24 hours).

## 2025-05-06 NOTE — NURSING NOTE
Current patient location: [address] [place] [id number]    Is the patient currently in the state of [place]? YES    Visit mode: VIDEO    If the visit is dropped, the patient can be reconnected by:VIDEO VISIT: Send to e-mail at: [email or web address]    Will anyone else be joining the visit? NO  (If patient encounters technical issues they should call [phone]    Are changes needed to the allergy or medication list? No    Are refills needed on medications prescribed by this physician? NO    Rooming Documentation:  Questionnaire(s) not done per department protocol    Reason for visit: Consult    Kathrin MONTANO

## 2025-05-28 ENCOUNTER — TELEPHONE (OUTPATIENT)
Dept: CARDIOLOGY | Facility: CLINIC | Age: 70
End: 2025-05-28
Payer: COMMERCIAL

## 2025-05-28 DIAGNOSIS — I10 BENIGN ESSENTIAL HYPERTENSION: Primary | ICD-10-CM

## 2025-05-29 RX ORDER — AMLODIPINE BESYLATE 5 MG/1
5 TABLET ORAL AT BEDTIME
Qty: 90 TABLET | Refills: 3 | Status: SHIPPED | OUTPATIENT
Start: 2025-05-29

## 2025-05-29 NOTE — TELEPHONE ENCOUNTER
Kraig Mac MD to Janet Hartman, RN  [facility] Heart Team 2  (Selected Message)    5/29/25  7:36 AM  Recommend he start amlodipine 5mg at bedtime for goal SBP <130/80 mmHg thanks       Called patient and reviewed recommendations from Dr Mac. He was agreeable to plan. Rx sent to pharmacy. He will call in 1-2 weeks if his home readings are still not at goal.

## 2025-06-02 ENCOUNTER — OFFICE VISIT (OUTPATIENT)
Dept: UROLOGY | Facility: CLINIC | Age: 70
End: 2025-06-02
Payer: COMMERCIAL

## 2025-06-02 VITALS — BODY MASS INDEX: 29.98 KG/M2 | DIASTOLIC BLOOD PRESSURE: 90 MMHG | SYSTOLIC BLOOD PRESSURE: 142 MMHG | WEIGHT: 203 LBS

## 2025-06-02 DIAGNOSIS — Z79.2 PROPHYLACTIC ANTIBIOTIC: ICD-10-CM

## 2025-06-02 DIAGNOSIS — D49.4 BLADDER TUMOR: ICD-10-CM

## 2025-06-02 DIAGNOSIS — R31.0 GROSS HEMATURIA: Primary | ICD-10-CM

## 2025-06-02 LAB
ALBUMIN UR-MCNC: NEGATIVE MG/DL
APPEARANCE UR: CLEAR
BILIRUB UR QL STRIP: NEGATIVE
COLOR UR AUTO: YELLOW
GLUCOSE UR STRIP-MCNC: NEGATIVE MG/DL
HGB UR QL STRIP: ABNORMAL
KETONES UR STRIP-MCNC: NEGATIVE MG/DL
LEUKOCYTE ESTERASE UR QL STRIP: NEGATIVE
NITRATE UR QL: NEGATIVE
PH UR STRIP: 6.5 [PH] (ref 5–7)
SP GR UR STRIP: <=1.005 (ref 1–1.03)
UROBILINOGEN UR STRIP-ACNC: 0.2 E.U./DL

## 2025-06-02 PROCEDURE — 52000 CYSTOURETHROSCOPY: CPT | Performed by: UROLOGY

## 2025-06-02 PROCEDURE — 3080F DIAST BP >= 90 MM HG: CPT | Performed by: UROLOGY

## 2025-06-02 PROCEDURE — 3077F SYST BP >= 140 MM HG: CPT | Performed by: UROLOGY

## 2025-06-02 PROCEDURE — 99214 OFFICE O/P EST MOD 30 MIN: CPT | Mod: 25 | Performed by: UROLOGY

## 2025-06-02 PROCEDURE — 1126F AMNT PAIN NOTED NONE PRSNT: CPT | Performed by: UROLOGY

## 2025-06-02 PROCEDURE — 81003 URINALYSIS AUTO W/O SCOPE: CPT | Mod: QW | Performed by: UROLOGY

## 2025-06-02 RX ORDER — CEFAZOLIN SODIUM 2 G/50ML
2 SOLUTION INTRAVENOUS SEE ADMIN INSTRUCTIONS
Status: CANCELLED | OUTPATIENT
Start: 2025-06-02

## 2025-06-02 RX ORDER — CEFAZOLIN SODIUM 2 G/50ML
2 SOLUTION INTRAVENOUS
Status: CANCELLED | OUTPATIENT
Start: 2025-06-02

## 2025-06-02 RX ORDER — CIPROFLOXACIN 500 MG/1
500 TABLET, FILM COATED ORAL ONCE
Qty: 1 TABLET | Refills: 0 | Status: SHIPPED | OUTPATIENT
Start: 2025-06-02 | End: 2025-06-02

## 2025-06-02 RX ORDER — ACETAMINOPHEN 325 MG/1
975 TABLET ORAL ONCE
Status: CANCELLED | OUTPATIENT
Start: 2025-06-02 | End: 2025-06-02

## 2025-06-02 RX ORDER — LIDOCAINE HYDROCHLORIDE 20 MG/ML
JELLY TOPICAL ONCE
Status: COMPLETED | OUTPATIENT
Start: 2025-06-02 | End: 2025-06-02

## 2025-06-02 RX ORDER — ACETAMINOPHEN 650 MG/1
650 SUPPOSITORY RECTAL ONCE
Status: CANCELLED | OUTPATIENT
Start: 2025-06-02

## 2025-06-02 RX ADMIN — LIDOCAINE HYDROCHLORIDE: 20 JELLY TOPICAL at 09:24

## 2025-06-02 ASSESSMENT — PAIN SCALES - GENERAL: PAINLEVEL_OUTOF10: NO PAIN (0)

## 2025-06-02 NOTE — PATIENT INSTRUCTIONS

## 2025-06-02 NOTE — NURSING NOTE
Chief Complaint   Patient presents with    Hematuria     Cystoscopy     Prior to the start of the procedure and with procedural staff participation, I verbally confirmed the patient s identity using two indicators, relevant allergies, that the procedure was appropriate and matched the consent or emergent situation, and that the correct equipment/implants were available. Immediately prior to starting the procedure I conducted the Time Out with the procedural staff and re-confirmed the patient s name, procedure, and site/side. I have wiped the patient off with the povidone-Iodine solution, draped them, and used Lidocaine hydrochloride jelly. (The Joint Commission universal protocol was followed.)  Yes    Sedation (Moderate or Deep): None    5mL 2% lidocaine hydrochloride Urojet instilled into urethra.    NDC# [address]-5  Lot #: [id number]  Expiration Date:  1/27    Raisa Ellis, Clinic Assistant

## 2025-06-02 NOTE — LETTER
6/2/2025       RE: Issac Jackson  [address] [facility] [address]     Dear Colleague,    Thank you for referring your patient, Issac Jackson, to the [facility] [place] at [facility]. Please see a copy of my visit note below.    Office Visit Note  [facility]  [phone]    UROLOGIC DIAGNOSES:   Gross hematuria    CURRENT INTERVENTIONS:   Cystoscopy with evacuation of bladder hematoma 2024    HISTORY:   Issac returns for evaluation with office cystoscopy.  He had a CT urogram performed in May that showed normal upper urinary tracts but a potential mass versus hemorrhage on the right side of the bladder, therefore I recommend a cystoscopy.  He remains off of his Plavix at this time because every time he has gone back on his Plavix the bleeding has started again.  He otherwise has no urinary complaints and reports a normal urinary stream with no nocturia.      PAST MEDICAL HISTORY:   Past Medical History:   Diagnosis Date     Benign essential hypertension 09/04/2024     Coronary artery disease involving native coronary artery of native heart without angina pectoris 09/04/2024     Hyperlipidemia LDL goal <70 09/04/2024     Hypertension      Ischemic cardiomyopathy 1/18/2017       PAST SURGICAL HISTORY:   Past Surgical History:   Procedure Laterality Date     CYSTOSCOPY, FULGURATE BLEEDERS, EVACUATE CLOT(S), COMBINED N/A 7/21/2024    Procedure: CYSTOSCOPY, WITH FULGURATION OF HEMORRHAGING BLOOD VESSEL AND THROMBUS REMOVAL;  Surgeon: Noah Adame MD;  Location: [facility] OR     HERNIA REPAIR         FAMILY HISTORY: No family history on file.    SOCIAL HISTORY:   Social History     Socioeconomic History     Marital status:    Tobacco Use     Smoking status: Former     Types: Cigarettes     Smokeless tobacco: Never   Substance and Sexual Activity     Alcohol use: Yes     Comment: very rarely     Drug use: Never     Sexual  activity: Not Currently       Review Of Systems:  Skin: No rash, pruritis, or skin pigmentation  Eyes: No changes in vision  Ears/Nose/Throat: No changes in hearing, no nosebleeds  Respiratory: No shortness of breath, dyspnea on exertion, cough, or hemoptysis  Cardiovascular: No chest pain or palpitations  Gastrointestinal: No diarrhea or constipation. No abdominal pain. No hematochezia  Genitourinary: see HPI  Musculoskeletal: No pain or swelling of joints, normal range of motion  Neurologic: No weakness or tremors  Psychiatric: No recent changes in memory or mood  Hematologic/Lymphatic/Immunologic: No easy bruising or enlarged lymph nodes  Endocrine: No weight gain or loss      PHYSICAL EXAM:    There were no vitals taken for this visit.    Constitutional: Well developed. Conversant and in no acute distress  Eyes: Anicteric sclera, conjunctiva clear, normal extraocular movements  ENT: Normocephalic and atraumatic,   Skin: Warm and dry. No rashes or lesions  Cardiac: No peripheral edema.  Heart regular rate and rhythm without murmurs rubs or gallops.  Back/Flank: Not done  CNS/PNS: Normal musculature and movements, moves all extremities normally  Respiratory: Normal non-labored breathing.  Lungs clear to auscultation bilaterally.  Abdomen: Soft nontender and nondistended  Peripheral Vascular: No peripheral edema  Mental Status/Psych: Alert and Oriented x 3. Normal mood and affect    Penis: Not done  Scrotal Skin: Not done  Testicles: Not done  Epididymis: Not done  Digital Rectal Exam:     Cystoscopy: I performed flexible cystoscopy today and there were 3 abnormal areas in the bladder: The largest area was located posteriorly and superiorly off towards the right side of the bladder.  There is concern for a solid tumor in this area, although there were not many papillary changes.  There was erythema and what appeared to be healing from a previous trauma, although his cystoscopy in the operating room was now 11  months ago.    He had 2 more subtle appearing areas of papillary abnormalities, 1 of which was well above the left ureteral orifice and the other was just above the right ureteral orifice.  Both of these were concerning for either a low-grade noninvasive bladder cancer or potentially PUNLMP    Imaging: None    Urinalysis: UA RESULTS:  Recent Labs   Lab Test 04/07/25  0924 03/01/25  1019   COLOR Yellow Light Brown*   APPEARANCE Clear Clear   URINEGLC Negative Negative   URINEBILI Negative Negative   URINEKETONE Negative Negative   SG 1.015 1.006   UBLD Small* Large*   URINEPH 5.5 5.5   PROTEIN Negative 30*   UROBILINOGEN 0.2  --    NITRITE Negative Negative   LEUKEST Negative Negative   RBCU  --  58*   WBCU  --  3       PSA:     Post Void Residual:     Other labs: None today      IMPRESSION:  Gross hematuria  Bladder abnormalities    PLAN:  We discussed my cystoscopy findings today.  The abnormalities were concerning enough that they will require biopsy or transurethral resection.  The larger abnormality will require transurethral resection of bladder tumor and we discussed this procedure in detail.  The small abnormalities will likely be removed with excisional biopsy using the biopsy forceps.  We discussed the procedure in detail along with its risks and expected recovery.  This can be performed as an outpatient.  He has been off of his Plavix but unfortunately he has needed to be off of the medication because every time he takes the Plavix the hematuria recurs.  He understands that he will need to go home with a Jansen catheter after the procedure as well.  We are getting him scheduled in the operating room.        Kaden Alexander M.D.              Again, thank you for allowing me to participate in the care of your patient.      Sincerely,    Kaden Alexander MD

## 2025-06-02 NOTE — PROGRESS NOTES
Office Visit Note  [facility]  [phone]    UROLOGIC DIAGNOSES:   Gross hematuria    CURRENT INTERVENTIONS:   Cystoscopy with evacuation of bladder hematoma 2024    HISTORY:   Issac returns for evaluation with office cystoscopy.  He had a CT urogram performed in May that showed normal upper urinary tracts but a potential mass versus hemorrhage on the right side of the bladder, therefore I recommend a cystoscopy.  He remains off of his Plavix at this time because every time he has gone back on his Plavix the bleeding has started again.  He otherwise has no urinary complaints and reports a normal urinary stream with no nocturia.      PAST MEDICAL HISTORY:   Past Medical History:   Diagnosis Date    Benign essential hypertension 09/04/2024    Coronary artery disease involving native coronary artery of native heart without angina pectoris 09/04/2024    Hyperlipidemia LDL goal <70 09/04/2024    Hypertension     Ischemic cardiomyopathy 1/18/2017       PAST SURGICAL HISTORY:   Past Surgical History:   Procedure Laterality Date    CYSTOSCOPY, FULGURATE BLEEDERS, EVACUATE CLOT(S), COMBINED N/A 7/21/2024    Procedure: CYSTOSCOPY, WITH FULGURATION OF HEMORRHAGING BLOOD VESSEL AND THROMBUS REMOVAL;  Surgeon: Noah Adame MD;  Location: [facility] OR    HERNIA REPAIR         FAMILY HISTORY: No family history on file.    SOCIAL HISTORY:   Social History     Socioeconomic History    Marital status:    Tobacco Use    Smoking status: Former     Types: Cigarettes    Smokeless tobacco: Never   Substance and Sexual Activity    Alcohol use: Yes     Comment: very rarely    Drug use: Never    Sexual activity: Not Currently       Review Of Systems:  Skin: No rash, pruritis, or skin pigmentation  Eyes: No changes in vision  Ears/Nose/Throat: No changes in hearing, no nosebleeds  Respiratory: No shortness of breath, dyspnea on exertion, cough, or hemoptysis  Cardiovascular: No chest pain or palpitations  Gastrointestinal:  No diarrhea or constipation. No abdominal pain. No hematochezia  Genitourinary: see HPI  Musculoskeletal: No pain or swelling of joints, normal range of motion  Neurologic: No weakness or tremors  Psychiatric: No recent changes in memory or mood  Hematologic/Lymphatic/Immunologic: No easy bruising or enlarged lymph nodes  Endocrine: No weight gain or loss      PHYSICAL EXAM:    There were no vitals taken for this visit.    Constitutional: Well developed. Conversant and in no acute distress  Eyes: Anicteric sclera, conjunctiva clear, normal extraocular movements  ENT: Normocephalic and atraumatic,   Skin: Warm and dry. No rashes or lesions  Cardiac: No peripheral edema.  Heart regular rate and rhythm without murmurs rubs or gallops.  Back/Flank: Not done  CNS/PNS: Normal musculature and movements, moves all extremities normally  Respiratory: Normal non-labored breathing.  Lungs clear to auscultation bilaterally.  Abdomen: Soft nontender and nondistended  Peripheral Vascular: No peripheral edema  Mental Status/Psych: Alert and Oriented x 3. Normal mood and affect    Penis: Not done  Scrotal Skin: Not done  Testicles: Not done  Epididymis: Not done  Digital Rectal Exam:     Cystoscopy: I performed flexible cystoscopy today and there were 3 abnormal areas in the bladder: The largest area was located posteriorly and superiorly off towards the right side of the bladder.  There is concern for a solid tumor in this area, although there were not many papillary changes.  There was erythema and what appeared to be healing from a previous trauma, although his cystoscopy in the operating room was now 11 months ago.    He had 2 more subtle appearing areas of papillary abnormalities, 1 of which was well above the left ureteral orifice and the other was just above the right ureteral orifice.  Both of these were concerning for either a low-grade noninvasive bladder cancer or potentially PUNLMP    Imaging: None    Urinalysis: UA  RESULTS:  Recent Labs   Lab Test 04/07/25  0924 03/01/25  1019   COLOR Yellow Light Brown*   APPEARANCE Clear Clear   URINEGLC Negative Negative   URINEBILI Negative Negative   URINEKETONE Negative Negative   SG 1.015 1.006   UBLD Small* Large*   URINEPH 5.5 5.5   PROTEIN Negative 30*   UROBILINOGEN 0.2  --    NITRITE Negative Negative   LEUKEST Negative Negative   RBCU  --  58*   WBCU  --  3       PSA:     Post Void Residual:     Other labs: None today      IMPRESSION:  Gross hematuria  Bladder abnormalities    PLAN:  We discussed my cystoscopy findings today.  The abnormalities were concerning enough that they will require biopsy or transurethral resection.  The larger abnormality will require transurethral resection of bladder tumor and we discussed this procedure in detail.  The small abnormalities will likely be removed with excisional biopsy using the biopsy forceps.  We discussed the procedure in detail along with its risks and expected recovery.  This can be performed as an outpatient.  He has been off of his Plavix but unfortunately he has needed to be off of the medication because every time he takes the Plavix the hematuria recurs.  He understands that he will need to go home with a Jansen catheter after the procedure as well.  We are getting him scheduled in the operating room.        Kaden Alexander M.D.

## 2025-06-04 ENCOUNTER — ANESTHESIA EVENT (OUTPATIENT)
Dept: SURGERY | Facility: CLINIC | Age: 70
End: 2025-06-04
Payer: COMMERCIAL

## 2025-06-04 ENCOUNTER — ANESTHESIA (OUTPATIENT)
Dept: SURGERY | Facility: CLINIC | Age: 70
End: 2025-06-04
Payer: COMMERCIAL

## 2025-06-04 ENCOUNTER — HOSPITAL ENCOUNTER (OUTPATIENT)
Facility: CLINIC | Age: 70
Discharge: HOME | End: 2025-06-04
Attending: UROLOGY | Admitting: UROLOGY
Payer: COMMERCIAL

## 2025-06-04 VITALS
BODY MASS INDEX: 30.18 KG/M2 | SYSTOLIC BLOOD PRESSURE: 162 MMHG | DIASTOLIC BLOOD PRESSURE: 110 MMHG | RESPIRATION RATE: 18 BRPM | OXYGEN SATURATION: 96 % | WEIGHT: 204.4 LBS | TEMPERATURE: 97.6 F | HEART RATE: 64 BPM

## 2025-06-04 DIAGNOSIS — N32.89 BLADDER SPASM: Primary | ICD-10-CM

## 2025-06-04 PROCEDURE — 258N000001 HC RX 258: Performed by: UROLOGY

## 2025-06-04 PROCEDURE — 258N000003 HC RX IP 258 OP 636: Performed by: NURSE ANESTHETIST, CERTIFIED REGISTERED

## 2025-06-04 PROCEDURE — 88341 IMHCHEM/IMCYTCHM EA ADD ANTB: CPT | Mod: TC | Performed by: UROLOGY

## 2025-06-04 PROCEDURE — 250N000011 HC RX IP 250 OP 636: Performed by: ANESTHESIOLOGY

## 2025-06-04 PROCEDURE — 250N000009 HC RX 250: Performed by: NURSE ANESTHETIST, CERTIFIED REGISTERED

## 2025-06-04 PROCEDURE — 88305 TISSUE EXAM BY PATHOLOGIST: CPT | Mod: 26 | Performed by: PATHOLOGY

## 2025-06-04 PROCEDURE — 52240 CYSTOSCOPY AND TREATMENT: CPT | Performed by: UROLOGY

## 2025-06-04 PROCEDURE — 250N000011 HC RX IP 250 OP 636: Performed by: UROLOGY

## 2025-06-04 PROCEDURE — 250N000025 HC SEVOFLURANE, PER MIN: Performed by: UROLOGY

## 2025-06-04 PROCEDURE — 370N000017 HC ANESTHESIA TECHNICAL FEE, PER MIN: Performed by: UROLOGY

## 2025-06-04 PROCEDURE — 360N000076 HC SURGERY LEVEL 3, PER MIN: Performed by: UROLOGY

## 2025-06-04 PROCEDURE — 88342 IMHCHEM/IMCYTCHM 1ST ANTB: CPT | Mod: 26 | Performed by: PATHOLOGY

## 2025-06-04 PROCEDURE — 88341 IMHCHEM/IMCYTCHM EA ADD ANTB: CPT | Mod: 26 | Performed by: PATHOLOGY

## 2025-06-04 PROCEDURE — 999N000141 HC STATISTIC PRE-PROCEDURE NURSING ASSESSMENT: Performed by: UROLOGY

## 2025-06-04 PROCEDURE — 272N000001 HC OR GENERAL SUPPLY STERILE: Performed by: UROLOGY

## 2025-06-04 PROCEDURE — 710N000012 HC RECOVERY PHASE 2, PER MINUTE: Performed by: UROLOGY

## 2025-06-04 PROCEDURE — 88307 TISSUE EXAM BY PATHOLOGIST: CPT | Mod: 26 | Performed by: PATHOLOGY

## 2025-06-04 PROCEDURE — 250N000013 HC RX MED GY IP 250 OP 250 PS 637: Performed by: UROLOGY

## 2025-06-04 PROCEDURE — 710N000009 HC RECOVERY PHASE 1, LEVEL 1, PER MIN: Performed by: UROLOGY

## 2025-06-04 PROCEDURE — 250N000011 HC RX IP 250 OP 636: Performed by: NURSE ANESTHETIST, CERTIFIED REGISTERED

## 2025-06-04 RX ORDER — ONDANSETRON 4 MG/1
4 TABLET, ORALLY DISINTEGRATING ORAL EVERY 30 MIN PRN
Status: DISCONTINUED | OUTPATIENT
Start: 2025-06-04 | End: 2025-06-04 | Stop reason: HOSPADM

## 2025-06-04 RX ORDER — SODIUM CHLORIDE, SODIUM LACTATE, POTASSIUM CHLORIDE, CALCIUM CHLORIDE 600; 310; 30; 20 MG/100ML; MG/100ML; MG/100ML; MG/100ML
INJECTION, SOLUTION INTRAVENOUS CONTINUOUS PRN
Status: DISCONTINUED | OUTPATIENT
Start: 2025-06-04 | End: 2025-06-04

## 2025-06-04 RX ORDER — FENTANYL CITRATE 50 UG/ML
INJECTION, SOLUTION INTRAMUSCULAR; INTRAVENOUS PRN
Status: DISCONTINUED | OUTPATIENT
Start: 2025-06-04 | End: 2025-06-04

## 2025-06-04 RX ORDER — SODIUM CHLORIDE, SODIUM LACTATE, POTASSIUM CHLORIDE, CALCIUM CHLORIDE 600; 310; 30; 20 MG/100ML; MG/100ML; MG/100ML; MG/100ML
INJECTION, SOLUTION INTRAVENOUS CONTINUOUS
Status: DISCONTINUED | OUTPATIENT
Start: 2025-06-04 | End: 2025-06-04 | Stop reason: HOSPADM

## 2025-06-04 RX ORDER — ACETAMINOPHEN 325 MG/1
975 TABLET ORAL ONCE
Status: COMPLETED | OUTPATIENT
Start: 2025-06-04 | End: 2025-06-04

## 2025-06-04 RX ORDER — FENTANYL CITRATE 50 UG/ML
25 INJECTION, SOLUTION INTRAMUSCULAR; INTRAVENOUS EVERY 5 MIN PRN
Status: DISCONTINUED | OUTPATIENT
Start: 2025-06-04 | End: 2025-06-04 | Stop reason: HOSPADM

## 2025-06-04 RX ORDER — HYDROMORPHONE HCL IN WATER/PF 6 MG/30 ML
0.2 PATIENT CONTROLLED ANALGESIA SYRINGE INTRAVENOUS EVERY 5 MIN PRN
Status: DISCONTINUED | OUTPATIENT
Start: 2025-06-04 | End: 2025-06-04 | Stop reason: HOSPADM

## 2025-06-04 RX ORDER — ACETAMINOPHEN 650 MG/1
650 SUPPOSITORY RECTAL ONCE
Status: COMPLETED | OUTPATIENT
Start: 2025-06-04 | End: 2025-06-04

## 2025-06-04 RX ORDER — ONDANSETRON 2 MG/ML
4 INJECTION INTRAMUSCULAR; INTRAVENOUS EVERY 30 MIN PRN
Status: DISCONTINUED | OUTPATIENT
Start: 2025-06-04 | End: 2025-06-04 | Stop reason: HOSPADM

## 2025-06-04 RX ORDER — DEXAMETHASONE SODIUM PHOSPHATE 4 MG/ML
4 INJECTION, SOLUTION INTRA-ARTICULAR; INTRALESIONAL; INTRAMUSCULAR; INTRAVENOUS; SOFT TISSUE
Status: DISCONTINUED | OUTPATIENT
Start: 2025-06-04 | End: 2025-06-04 | Stop reason: HOSPADM

## 2025-06-04 RX ORDER — ONDANSETRON 2 MG/ML
INJECTION INTRAMUSCULAR; INTRAVENOUS PRN
Status: DISCONTINUED | OUTPATIENT
Start: 2025-06-04 | End: 2025-06-04

## 2025-06-04 RX ORDER — LIDOCAINE 40 MG/G
CREAM TOPICAL
Status: DISCONTINUED | OUTPATIENT
Start: 2025-06-04 | End: 2025-06-04 | Stop reason: HOSPADM

## 2025-06-04 RX ORDER — OXYBUTYNIN CHLORIDE 5 MG/1
5 TABLET, EXTENDED RELEASE ORAL DAILY
Qty: 30 TABLET | Refills: 0 | Status: SHIPPED | OUTPATIENT
Start: 2025-06-04

## 2025-06-04 RX ORDER — HYDRALAZINE HYDROCHLORIDE 20 MG/ML
5 INJECTION INTRAMUSCULAR; INTRAVENOUS ONCE
Status: COMPLETED | OUTPATIENT
Start: 2025-06-04 | End: 2025-06-04

## 2025-06-04 RX ORDER — PROPOFOL 10 MG/ML
INJECTION, EMULSION INTRAVENOUS PRN
Status: DISCONTINUED | OUTPATIENT
Start: 2025-06-04 | End: 2025-06-04

## 2025-06-04 RX ORDER — GLYCOPYRROLATE 0.2 MG/ML
INJECTION, SOLUTION INTRAMUSCULAR; INTRAVENOUS PRN
Status: DISCONTINUED | OUTPATIENT
Start: 2025-06-04 | End: 2025-06-04

## 2025-06-04 RX ORDER — NALOXONE HYDROCHLORIDE 0.4 MG/ML
0.1 INJECTION, SOLUTION INTRAMUSCULAR; INTRAVENOUS; SUBCUTANEOUS
Status: DISCONTINUED | OUTPATIENT
Start: 2025-06-04 | End: 2025-06-04 | Stop reason: HOSPADM

## 2025-06-04 RX ORDER — CEFAZOLIN SODIUM/WATER 2 G/20 ML
2 SYRINGE (ML) INTRAVENOUS
Status: COMPLETED | OUTPATIENT
Start: 2025-06-04 | End: 2025-06-04

## 2025-06-04 RX ORDER — CEFAZOLIN SODIUM/WATER 2 G/20 ML
2 SYRINGE (ML) INTRAVENOUS SEE ADMIN INSTRUCTIONS
Status: DISCONTINUED | OUTPATIENT
Start: 2025-06-04 | End: 2025-06-04 | Stop reason: HOSPADM

## 2025-06-04 RX ORDER — HYDROMORPHONE HCL IN WATER/PF 6 MG/30 ML
0.4 PATIENT CONTROLLED ANALGESIA SYRINGE INTRAVENOUS EVERY 5 MIN PRN
Status: DISCONTINUED | OUTPATIENT
Start: 2025-06-04 | End: 2025-06-04 | Stop reason: HOSPADM

## 2025-06-04 RX ORDER — DEXAMETHASONE SODIUM PHOSPHATE 4 MG/ML
INJECTION, SOLUTION INTRA-ARTICULAR; INTRALESIONAL; INTRAMUSCULAR; INTRAVENOUS; SOFT TISSUE PRN
Status: DISCONTINUED | OUTPATIENT
Start: 2025-06-04 | End: 2025-06-04

## 2025-06-04 RX ORDER — LIDOCAINE HYDROCHLORIDE 20 MG/ML
INJECTION, SOLUTION INFILTRATION; PERINEURAL PRN
Status: DISCONTINUED | OUTPATIENT
Start: 2025-06-04 | End: 2025-06-04

## 2025-06-04 RX ORDER — FENTANYL CITRATE 50 UG/ML
50 INJECTION, SOLUTION INTRAMUSCULAR; INTRAVENOUS EVERY 5 MIN PRN
Status: DISCONTINUED | OUTPATIENT
Start: 2025-06-04 | End: 2025-06-04 | Stop reason: HOSPADM

## 2025-06-04 RX ADMIN — PHENYLEPHRINE HYDROCHLORIDE 50 MCG: 10 INJECTION INTRAVENOUS at 12:47

## 2025-06-04 RX ADMIN — HYDRALAZINE HYDROCHLORIDE 5 MG: 20 INJECTION INTRAMUSCULAR; INTRAVENOUS at 13:28

## 2025-06-04 RX ADMIN — Medication 40 MG: at 13:00

## 2025-06-04 RX ADMIN — PROPOFOL 160 MG: 10 INJECTION, EMULSION INTRAVENOUS at 12:40

## 2025-06-04 RX ADMIN — DEXAMETHASONE SODIUM PHOSPHATE 4 MG: 4 INJECTION, SOLUTION INTRA-ARTICULAR; INTRALESIONAL; INTRAMUSCULAR; INTRAVENOUS; SOFT TISSUE at 12:45

## 2025-06-04 RX ADMIN — LIDOCAINE HYDROCHLORIDE 30 MG: 20 INJECTION, SOLUTION INFILTRATION; PERINEURAL at 12:40

## 2025-06-04 RX ADMIN — FENTANYL CITRATE 50 MCG: 50 INJECTION INTRAMUSCULAR; INTRAVENOUS at 12:40

## 2025-06-04 RX ADMIN — ACETAMINOPHEN 975 MG: 325 TABLET, FILM COATED ORAL at 11:11

## 2025-06-04 RX ADMIN — PHENYLEPHRINE HYDROCHLORIDE 50 MCG: 10 INJECTION INTRAVENOUS at 12:54

## 2025-06-04 RX ADMIN — GLYCOPYRROLATE 0.1 MG: 0.2 INJECTION, SOLUTION INTRAMUSCULAR; INTRAVENOUS at 12:45

## 2025-06-04 RX ADMIN — Medication 2 G: at 12:37

## 2025-06-04 RX ADMIN — ONDANSETRON 4 MG: 2 INJECTION INTRAMUSCULAR; INTRAVENOUS at 12:45

## 2025-06-04 RX ADMIN — FENTANYL CITRATE 50 MCG: 50 INJECTION INTRAMUSCULAR; INTRAVENOUS at 12:47

## 2025-06-04 RX ADMIN — SODIUM CHLORIDE, SODIUM LACTATE, POTASSIUM CHLORIDE, AND CALCIUM CHLORIDE: .6; .31; .03; .02 INJECTION, SOLUTION INTRAVENOUS at 12:31

## 2025-06-04 RX ADMIN — PHENYLEPHRINE HYDROCHLORIDE 100 MCG: 10 INJECTION INTRAVENOUS at 12:58

## 2025-06-04 ASSESSMENT — ACTIVITIES OF DAILY LIVING (ADL)
ADLS_ACUITY_SCORE: 51
ADLS_ACUITY_SCORE: 49
ADLS_ACUITY_SCORE: 51

## 2025-06-04 NOTE — PROGRESS NOTES
Dr. Ledezma notified of patients blood pressures great than 160's systolic. Order for 5mg IV hydralazine received.

## 2025-06-04 NOTE — DISCHARGE INSTRUCTIONS
Remove baig catheter at home on Friday 6/6 in the morning    Maximum acetaminophen (Tylenol) dose from all sources should not exceed 4 grams (4000 mg) per day.  You last had 975 mg at 11am; do not take Tylenol products again until after 5pm if needed.     DR. ABA KNIGHT   CLINIC PHONE NUMBER:  [phone]  [facility] [place] [facility]

## 2025-06-04 NOTE — ANESTHESIA POSTPROCEDURE EVALUATION
Patient: Issac Jackson    Procedure: Procedure(s):  CYSTOSCOPY, WITH TRANSURETHRAL RESECTION BLADDER TUMOR       Anesthesia Type:  General    Note:  Disposition: Outpatient   Postop Pain Control: Uneventful            Sign Out: Well controlled pain   PONV: No   Neuro/Psych: Uneventful            Sign Out: Acceptable/Baseline neuro status   Airway/Respiratory: Uneventful            Sign Out: Acceptable/Baseline resp. status   CV/Hemodynamics: Uneventful            Sign Out: Acceptable CV status; No obvious hypovolemia; No obvious fluid overload   Other NRE: NONE   DID A NON-ROUTINE EVENT OCCUR? No           Last vitals:  Vitals Value Taken Time   /93 06/04/25 13:28   Temp 97.5  F (36.4  C) 06/04/25 13:15   Pulse 63 06/04/25 13:27   Resp 10 06/04/25 13:28   SpO2 96 % 06/04/25 13:28   Vitals shown include unfiled device data.    Electronically Signed By: Sonny Ledezma MD  June 4, 2025  1:30 PM

## 2025-06-04 NOTE — ANESTHESIA PREPROCEDURE EVALUATION
Anesthesia Pre-Procedure Evaluation    Patient: Issac Jackson   MRN: [id number] : 1955          Procedure : Procedure(s):  CYSTOSCOPY, WITH TRANSURETHRAL RESECTION BLADDER TUMOR         Past Medical History:   Diagnosis Date    Benign essential hypertension 2024    Coronary artery disease involving native coronary artery of native heart without angina pectoris 2024    Hyperlipidemia LDL goal <70 2024    Hypertension     Ischemic cardiomyopathy 2017      Past Surgical History:   Procedure Laterality Date    CYSTOSCOPY, FULGURATE BLEEDERS, EVACUATE CLOT(S), COMBINED N/A 2024    Procedure: CYSTOSCOPY, WITH FULGURATION OF HEMORRHAGING BLOOD VESSEL AND THROMBUS REMOVAL;  Surgeon: Noah Adame MD;  Location: [facility] OR    HERNIA REPAIR        No Known Allergies   Social History     Tobacco Use    Smoking status: Former     Types: Cigarettes    Smokeless tobacco: Never   Substance Use Topics    Alcohol use: Yes     Comment: very rarely      Wt Readings from Last 1 Encounters:   25 92.7 kg (204 lb 6.4 oz)        Anesthesia Evaluation            ROS/MED HX  ENT/Pulmonary:  - neg pulmonary ROS     Neurologic:  - neg neurologic ROS     Cardiovascular:     (+)  hypertension- -  CAD -  - stent-                                      METS/Exercise Tolerance:     Hematologic:  - neg hematologic  ROS     Musculoskeletal:  - neg musculoskeletal ROS     GI/Hepatic:  - neg GI/hepatic ROS     Renal/Genitourinary:  - neg Renal ROS     Endo:  - neg endo ROS     Psychiatric/Substance Use:  - neg psychiatric ROS     Infectious Disease:  - neg infectious disease ROS     Malignancy:       Other:              Physical Exam  Airway  Mallampati: II  TM distance: >3 FB  Neck ROM: full    Cardiovascular - normal exam   Dental   (+) Modest Abnormalities - crowns, retainers, 1 or 2 missing teeth      Pulmonary - normal exam      Neurological - normal exam  He appears awake, alert and oriented x3.    Other  "Findings       OUTSIDE LABS:  CBC:   Lab Results   Component Value Date    WBC 8.3 03/04/2025    WBC 7.5 03/01/2025    HGB 14.7 03/04/2025    HGB 14.5 03/01/2025    HCT 44.9 03/04/2025    HCT 44.2 03/01/2025     03/04/2025     03/01/2025     BMP:   Lab Results   Component Value Date     03/04/2025     03/01/2025    POTASSIUM 4.1 03/04/2025    POTASSIUM 4.7 03/01/2025    CHLORIDE 103 03/04/2025    CHLORIDE 103 03/01/2025    CO2 27 03/04/2025    CO2 28 03/01/2025    BUN 22.7 03/04/2025    BUN 18.5 03/01/2025    CR 1.5 (H) 05/01/2025    CR 1.35 (H) 03/04/2025    GLC 88 03/04/2025     (H) 03/01/2025     COAGS:   Lab Results   Component Value Date    INR 0.88 10/05/2016     POC: No results found for: \"BGM\", \"HCG\", \"HCGS\"  HEPATIC:   Lab Results   Component Value Date    ALBUMIN 3.8 07/21/2024    PROTTOTAL 6.2 (L) 07/21/2024    ALT 28 03/04/2025    AST 20 07/21/2024    ALKPHOS 103 07/21/2024    BILITOTAL 0.8 07/21/2024     OTHER:   Lab Results   Component Value Date    JONELLE 9.9 03/04/2025    MAG 2.0 11/09/2023       Anesthesia Plan    ASA Status:  3       Anesthesia Type: General.  Airway: supraglottic airway.  Induction: intravenous.  Maintenance: Inhalation.   Techniques and Equipment:     - Airway:  Planned airway equipment includes supraglottic airway.     - Monitoring Plan: standard ASA monitoring     Consents    Anesthesia Plan(s) and associated risks, benefits, and realistic alternatives discussed. Questions answered and patient/representative(s) expressed understanding.     - Discussed: anesthesiologist     - Discussed with:                Postoperative Care         Comments:                   Sonny Ledezma MD    I have reviewed the pertinent notes and labs in the chart from the past 30 days and (re)examined the patient.  Any updates or changes from those notes are reflected in this note.    Clinically Significant Risk Factors Present on Admission                 # Drug " "Induced Platelet Defect: home medication list includes an antiplatelet medication   # Hypertension: Noted on problem list           # Obesity: Estimated body mass index is 30.18 kg/m  as calculated from the following:    Height as of 5/6/25: 1.753 m (5' 9\").    Weight as of this encounter: 92.7 kg (204 lb 6.4 oz).                    "

## 2025-06-04 NOTE — ANESTHESIA CARE TRANSFER NOTE
Patient: Issac Jackson    Procedure: Procedure(s):  CYSTOSCOPY, WITH TRANSURETHRAL RESECTION BLADDER TUMOR       Diagnosis: Bladder tumor [D49.4]  Diagnosis Additional Information: No value filed.    Anesthesia Type:   General     Note:    Oropharynx: oropharynx clear of all foreign objects and spontaneously breathing  Level of Consciousness: awake  Oxygen Supplementation: blow-by O2  Level of Supplemental Oxygen (L/min / FiO2): 6  Independent Airway: airway patency satisfactory and stable  Dentition: dentition unchanged  Vital Signs Stable: post-procedure vital signs reviewed and stable  Report to RN Given: handoff report given  Patient transferred to: PACU    Handoff Report: Identifed the Patient, Identified the Reponsible Provider, Reviewed the pertinent medical history, Discussed the surgical course, Reviewed Intra-OP anesthesia mangement and issues during anesthesia, Set expectations for post-procedure period and Allowed opportunity for questions and acknowledgement of understanding      Vitals:  Vitals Value Taken Time   BP     Temp 97.52  F (36.4  C) 06/04/25 13:13   Pulse 65 06/04/25 13:13   Resp 16 06/04/25 13:13   SpO2 99 % 06/04/25 13:13   Vitals shown include unfiled device data.    Electronically Signed By: RY Coleman CRNA  June 4, 2025  1:14 PM

## 2025-06-04 NOTE — PROGRESS NOTES
Dr. Alexander made aware that pt took his 81mg Aspirin yesterday. Dr. Alexander states that is fine.

## 2025-06-04 NOTE — OP NOTE
SURGEON:  Kaden Alexander MD     PREOPERATIVE DIAGNOSIS: Bladder tumor     POSTOPERATIVE DIAGNOSIS: Bladder tumor     PROCEDURE PERFORMED: Cystoscopy, transurethral resection of the bladder tumor, large, total area greater than 5 cm     ANESTHESIA:  General.     COMPLICATIONS:  None     INDICATIONS FOR PROCEDURE: This is a 69-year-old gentleman with gross hematuria who was found to have multiple bladder tumors present on office cystoscopy.  He now Lalito transurethral section of bladder tumor.    DETAILS OF PROCEDURE: The risks and benefits of the procedure were explained in detail the patient and informed consent was obtained.  The patient was brought to the operating room and placed supine on the operating table where he underwent general endotracheal anesthetic.  He was then moved down to the dorsal lithotomy position where he was prepped and draped in the standard sterile fashion.    The procedure began by introducing a 22 Arabic rigid cystoscope.  There were multiple small tumors located posteriorly on the bladder, above each ureteral orifice.  There was a larger tumor located on the right side of the bladder.  Of note, this large tumor was quite difficult to see.  At first I needed to use a 70 degree lens to locate the tumor.  I later found that if I had the bladder mostly empty I could see it by looking as laterally as possible with a 30 degree lens.  I used the cold cup biopsy forcep to biopsy the small tumors that were above each ureteral orifice, the larger cluster of tumors was on the left side.  I then removed the scope and using the visual obturator to introduce the 26 Arabic resectoscope sheath into the bladder.  I first used cautery to cauterize the area of biopsy.  I then used the resectoscope loop to resect out the larger tumor on the right side of the bladder.  I used cautery function to achieve hemostasis.  Flushed out the specimen through the scope.  Once there was good hemostasis I removed the  scope and drained the bladder with a 20 Kazakh coudé tip Jansen catheter.      The patient tolerated the procedure well without complications.  He went to the postanesthetic care unit in good condition.  I will contact him with his pathology results.  He is to remove his own Jansen catheter at home in 2 days.

## 2025-06-04 NOTE — ANESTHESIA PROCEDURE NOTES
Airway       Patient location during procedure: OR  Staff -        Anesthesiologist:  Sonny Ledezma MD       CRNA: Shante Yepez APRN CRNA       Performed By: CRNA  Consent for Airway        Urgency: elective  Indications and Patient Condition       Indications for airway management: salty-procedural       Induction type:intravenous       Mask difficulty assessment: 1 - vent by mask    Final Airway Details       Final airway type: supraglottic airway    Supraglottic Airway Details        Type: LMA       Brand: I-Gel       LMA size: 5    Post intubation assessment        Placement verified by: capnometry, equal breath sounds and chest rise        Number of attempts at approach: 1       Number of other approaches attempted: 0       Secured with: commercial tube gandhi       Ease of procedure: easy       Dentition: Unchanged

## 2025-06-06 LAB
PATH REPORT.COMMENTS IMP SPEC: ABNORMAL
PATH REPORT.COMMENTS IMP SPEC: ABNORMAL
PATH REPORT.COMMENTS IMP SPEC: YES
PATH REPORT.FINAL DX SPEC: ABNORMAL
PATH REPORT.GROSS SPEC: ABNORMAL
PATH REPORT.MICROSCOPIC SPEC OTHER STN: ABNORMAL
PATH REPORT.RELEVANT HX SPEC: ABNORMAL
PHOTO IMAGE: ABNORMAL

## 2025-06-09 ENCOUNTER — TELEPHONE (OUTPATIENT)
Dept: SURGERY | Facility: CLINIC | Age: 70
End: 2025-06-09
Payer: COMMERCIAL

## 2025-06-09 NOTE — TELEPHONE ENCOUNTER
Spoke on phone re: pathology results  He has felt well since his procedure and is voiding well.  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

## 2025-06-14 ENCOUNTER — HEALTH MAINTENANCE LETTER (OUTPATIENT)
Age: 70
End: 2025-06-14

## 2025-06-17 DIAGNOSIS — D49.4 BLADDER TUMOR: Primary | ICD-10-CM

## 2025-06-17 RX ORDER — CEFAZOLIN SODIUM 2 G/50ML
2 SOLUTION INTRAVENOUS SEE ADMIN INSTRUCTIONS
OUTPATIENT
Start: 2025-06-17

## 2025-06-17 RX ORDER — ACETAMINOPHEN 650 MG/1
650 SUPPOSITORY RECTAL ONCE
OUTPATIENT
Start: 2025-06-17

## 2025-06-17 RX ORDER — CEFAZOLIN SODIUM 2 G/50ML
2 SOLUTION INTRAVENOUS
OUTPATIENT
Start: 2025-06-17

## 2025-06-17 RX ORDER — ACETAMINOPHEN 325 MG/1
975 TABLET ORAL ONCE
OUTPATIENT
Start: 2025-06-17 | End: 2025-06-17

## 2025-06-18 ENCOUNTER — TELEPHONE (OUTPATIENT)
Dept: CARDIOLOGY | Facility: CLINIC | Age: 70
End: 2025-06-18

## 2025-06-18 ENCOUNTER — TELEPHONE (OUTPATIENT)
Dept: UROLOGY | Facility: CLINIC | Age: 70
End: 2025-06-18
Payer: COMMERCIAL

## 2025-06-18 ENCOUNTER — LAB (OUTPATIENT)
Dept: LAB | Facility: CLINIC | Age: 70
End: 2025-06-18
Payer: COMMERCIAL

## 2025-06-18 DIAGNOSIS — R31.0 GROSS HEMATURIA: ICD-10-CM

## 2025-06-18 DIAGNOSIS — R35.0 URINARY FREQUENCY: ICD-10-CM

## 2025-06-18 DIAGNOSIS — R30.0 DYSURIA: ICD-10-CM

## 2025-06-18 DIAGNOSIS — I10 BENIGN ESSENTIAL HYPERTENSION: ICD-10-CM

## 2025-06-18 DIAGNOSIS — R31.0 GROSS HEMATURIA: Primary | ICD-10-CM

## 2025-06-18 LAB
ALBUMIN UR-MCNC: NEGATIVE MG/DL
APPEARANCE UR: CLEAR
BILIRUB UR QL STRIP: NEGATIVE
COLOR UR AUTO: YELLOW
GLUCOSE UR STRIP-MCNC: NEGATIVE MG/DL
HGB UR QL STRIP: ABNORMAL
KETONES UR STRIP-MCNC: NEGATIVE MG/DL
LEUKOCYTE ESTERASE UR QL STRIP: ABNORMAL
NITRATE UR QL: NEGATIVE
PH UR STRIP: 5.5 [PH] (ref 5–7)
SP GR UR STRIP: 1.01 (ref 1–1.03)
UROBILINOGEN UR STRIP-ACNC: 0.2 E.U./DL

## 2025-06-18 PROCEDURE — 87086 URINE CULTURE/COLONY COUNT: CPT

## 2025-06-18 PROCEDURE — 81003 URINALYSIS AUTO W/O SCOPE: CPT | Mod: QW

## 2025-06-18 NOTE — TELEPHONE ENCOUNTER
"Patient left a voicemail calling to give a BP update and discuss a medication concern.     Recommendation 5/29/25 by Dr Mac-  \"Recommend he start amlodipine 5mg at bedtime for goal SBP <130/80 mmHg thanks\"       Called patient back, says he is concerned as his BP this morning was 102/67. He says it has been trending down since starting the amlodipine. He had some slight dizziness but this has resolved. He has been making slow position changes and staying hydrated. He states he really does not feel like the dizziness is \"a real issue\" for him as its only happened a few times. He denies any other side effects ie swelling.     He also mentions he had surgery 2wks ago to remove tumors from his bladder. He is scheduled for another surgery on 7/7 and they are recommending he stop ASA 10 days prior. Dr Mac messaged.             "

## 2025-06-18 NOTE — TELEPHONE ENCOUNTER
Patient called nurse line and LM. He has questions about blood in his urine and blood clots. He is concerned about upcoming surgery. Will forward message to RN care coordinator.     Dixie Landa LPN

## 2025-06-18 NOTE — TELEPHONE ENCOUNTER
Patient calling into nurse triage, states he's been having gross hematuria, urinary frequency and dysuria only at the end of urinary stream for the past week.  Patient denies fever/chills.  Patient is very active and has been doing heavy lifting.   Patient is s/p TURBT on 6/4/25 with Dr. Alexander.  Discussed that these symptoms are typical after TURBT and with the heavy lifting is also most likely due to this but considering he has another TURBT scheduled for 7/7/25 we should r/o UTI.  UA/UC orders placed.  Patient will leave urine specimen today at [facility] lab.    Jeniffer Osullivan RN, BSN  Urology Care Coordinator  [facility]  [phone]

## 2025-06-19 LAB — BACTERIA UR CULT: NO GROWTH

## 2025-06-19 RX ORDER — AMLODIPINE BESYLATE 2.5 MG/1
2.5 TABLET ORAL AT BEDTIME
Qty: 90 TABLET | Refills: 3 | Status: SHIPPED | OUTPATIENT
Start: 2025-06-19

## 2025-06-19 NOTE — TELEPHONE ENCOUNTER
Kraig Mac MD to Me  [facility] Heart Team 2  (Selected Message)    6/19/25  6:05 AM  Recommend decreasing the amlodipine to 2.5 mg at bedtime, monitor BPs at home given the AAA, can stop the aspirin as needed but should restart when able thanks         Called patient with Dr Mac's recommendations. He expressed understanding. He will cut his 5mg tabs in half, new Rx sent to pharmacy for 2.5mg tabs when ready to fill.

## 2025-06-20 ENCOUNTER — RESULTS FOLLOW-UP (OUTPATIENT)
Dept: FAMILY MEDICINE | Facility: CLINIC | Age: 70
End: 2025-06-20

## 2025-06-27 RX ORDER — ELECTROLYTES/DEXTROSE
1 SOLUTION, ORAL ORAL DAILY
COMMUNITY

## 2025-07-07 ENCOUNTER — HOSPITAL ENCOUNTER (OUTPATIENT)
Facility: CLINIC | Age: 70
Discharge: HOME | End: 2025-07-07
Attending: UROLOGY | Admitting: UROLOGY
Payer: COMMERCIAL

## 2025-07-07 ENCOUNTER — ANESTHESIA (OUTPATIENT)
Dept: SURGERY | Facility: CLINIC | Age: 70
End: 2025-07-07
Payer: COMMERCIAL

## 2025-07-07 ENCOUNTER — APPOINTMENT (OUTPATIENT)
Dept: GENERAL RADIOLOGY | Facility: CLINIC | Age: 70
End: 2025-07-07
Attending: UROLOGY
Payer: COMMERCIAL

## 2025-07-07 ENCOUNTER — ANESTHESIA EVENT (OUTPATIENT)
Dept: SURGERY | Facility: CLINIC | Age: 70
End: 2025-07-07
Payer: COMMERCIAL

## 2025-07-07 VITALS
BODY MASS INDEX: 29.98 KG/M2 | OXYGEN SATURATION: 99 % | SYSTOLIC BLOOD PRESSURE: 162 MMHG | HEART RATE: 56 BPM | WEIGHT: 202.4 LBS | DIASTOLIC BLOOD PRESSURE: 97 MMHG | RESPIRATION RATE: 16 BRPM | HEIGHT: 69 IN | TEMPERATURE: 97.2 F

## 2025-07-07 DIAGNOSIS — I25.10 CORONARY ARTERY DISEASE INVOLVING NATIVE CORONARY ARTERY OF NATIVE HEART WITHOUT ANGINA PECTORIS: ICD-10-CM

## 2025-07-07 PROCEDURE — C1758 CATHETER, URETERAL: HCPCS | Performed by: UROLOGY

## 2025-07-07 PROCEDURE — 250N000009 HC RX 250: Performed by: NURSE ANESTHETIST, CERTIFIED REGISTERED

## 2025-07-07 PROCEDURE — 258N000003 HC RX IP 258 OP 636: Performed by: ANESTHESIOLOGY

## 2025-07-07 PROCEDURE — 250N000011 HC RX IP 250 OP 636: Performed by: ANESTHESIOLOGY

## 2025-07-07 PROCEDURE — 250N000009 HC RX 250: Performed by: UROLOGY

## 2025-07-07 PROCEDURE — 999N000141 HC STATISTIC PRE-PROCEDURE NURSING ASSESSMENT: Performed by: UROLOGY

## 2025-07-07 PROCEDURE — 370N000017 HC ANESTHESIA TECHNICAL FEE, PER MIN: Performed by: UROLOGY

## 2025-07-07 PROCEDURE — 258N000003 HC RX IP 258 OP 636: Performed by: NURSE ANESTHETIST, CERTIFIED REGISTERED

## 2025-07-07 PROCEDURE — 250N000025 HC SEVOFLURANE, PER MIN: Performed by: UROLOGY

## 2025-07-07 PROCEDURE — 999N000179 XR SURGERY CARM FLUORO LESS THAN 5 MIN W STILLS

## 2025-07-07 PROCEDURE — 360N000083 HC SURGERY LEVEL 3 W/ FLUORO, PER MIN: Performed by: UROLOGY

## 2025-07-07 PROCEDURE — 272N000001 HC OR GENERAL SUPPLY STERILE: Performed by: UROLOGY

## 2025-07-07 PROCEDURE — C1769 GUIDE WIRE: HCPCS | Performed by: UROLOGY

## 2025-07-07 PROCEDURE — 710N000009 HC RECOVERY PHASE 1, LEVEL 1, PER MIN: Performed by: UROLOGY

## 2025-07-07 PROCEDURE — 52240 CYSTOSCOPY AND TREATMENT: CPT | Performed by: UROLOGY

## 2025-07-07 PROCEDURE — 88307 TISSUE EXAM BY PATHOLOGIST: CPT | Mod: 26 | Performed by: PATHOLOGY

## 2025-07-07 PROCEDURE — 255N000002 HC RX 255 OP 636: Performed by: UROLOGY

## 2025-07-07 PROCEDURE — 88342 IMHCHEM/IMCYTCHM 1ST ANTB: CPT | Mod: 26 | Performed by: PATHOLOGY

## 2025-07-07 PROCEDURE — 74420 UROGRAPHY RTRGR +-KUB: CPT | Mod: 26 | Performed by: UROLOGY

## 2025-07-07 PROCEDURE — 250N000013 HC RX MED GY IP 250 OP 250 PS 637: Performed by: UROLOGY

## 2025-07-07 PROCEDURE — 88341 IMHCHEM/IMCYTCHM EA ADD ANTB: CPT | Mod: TC | Performed by: UROLOGY

## 2025-07-07 PROCEDURE — 250N000011 HC RX IP 250 OP 636: Performed by: NURSE ANESTHETIST, CERTIFIED REGISTERED

## 2025-07-07 PROCEDURE — 250N000011 HC RX IP 250 OP 636: Performed by: UROLOGY

## 2025-07-07 PROCEDURE — 710N000012 HC RECOVERY PHASE 2, PER MINUTE: Performed by: UROLOGY

## 2025-07-07 PROCEDURE — 88341 IMHCHEM/IMCYTCHM EA ADD ANTB: CPT | Mod: 26 | Performed by: PATHOLOGY

## 2025-07-07 RX ORDER — NALOXONE HYDROCHLORIDE 0.4 MG/ML
0.1 INJECTION, SOLUTION INTRAMUSCULAR; INTRAVENOUS; SUBCUTANEOUS
Status: DISCONTINUED | OUTPATIENT
Start: 2025-07-07 | End: 2025-07-07 | Stop reason: HOSPADM

## 2025-07-07 RX ORDER — HYDROMORPHONE HCL IN WATER/PF 6 MG/30 ML
0.2 PATIENT CONTROLLED ANALGESIA SYRINGE INTRAVENOUS EVERY 5 MIN PRN
Status: DISCONTINUED | OUTPATIENT
Start: 2025-07-07 | End: 2025-07-07 | Stop reason: HOSPADM

## 2025-07-07 RX ORDER — HYDRALAZINE HYDROCHLORIDE 20 MG/ML
2.5-5 INJECTION INTRAMUSCULAR; INTRAVENOUS EVERY 10 MIN PRN
Status: DISCONTINUED | OUTPATIENT
Start: 2025-07-07 | End: 2025-07-07 | Stop reason: HOSPADM

## 2025-07-07 RX ORDER — FENTANYL CITRATE 50 UG/ML
25 INJECTION, SOLUTION INTRAMUSCULAR; INTRAVENOUS EVERY 5 MIN PRN
Status: DISCONTINUED | OUTPATIENT
Start: 2025-07-07 | End: 2025-07-07 | Stop reason: HOSPADM

## 2025-07-07 RX ORDER — ONDANSETRON 2 MG/ML
4 INJECTION INTRAMUSCULAR; INTRAVENOUS EVERY 30 MIN PRN
Status: DISCONTINUED | OUTPATIENT
Start: 2025-07-07 | End: 2025-07-07 | Stop reason: HOSPADM

## 2025-07-07 RX ORDER — DEXAMETHASONE SODIUM PHOSPHATE 4 MG/ML
INJECTION, SOLUTION INTRA-ARTICULAR; INTRALESIONAL; INTRAMUSCULAR; INTRAVENOUS; SOFT TISSUE PRN
Status: DISCONTINUED | OUTPATIENT
Start: 2025-07-07 | End: 2025-07-07

## 2025-07-07 RX ORDER — HYDROXYZINE HYDROCHLORIDE 10 MG/1
10 TABLET, FILM COATED ORAL EVERY 6 HOURS PRN
Status: DISCONTINUED | OUTPATIENT
Start: 2025-07-07 | End: 2025-07-07 | Stop reason: HOSPADM

## 2025-07-07 RX ORDER — ACETAMINOPHEN 650 MG/1
650 SUPPOSITORY RECTAL ONCE
Status: COMPLETED | OUTPATIENT
Start: 2025-07-07 | End: 2025-07-07

## 2025-07-07 RX ORDER — CEFAZOLIN SODIUM/WATER 2 G/20 ML
2 SYRINGE (ML) INTRAVENOUS
Status: COMPLETED | OUTPATIENT
Start: 2025-07-07 | End: 2025-07-07

## 2025-07-07 RX ORDER — LABETALOL HYDROCHLORIDE 5 MG/ML
10 INJECTION, SOLUTION INTRAVENOUS ONCE
Status: COMPLETED | OUTPATIENT
Start: 2025-07-07 | End: 2025-07-07

## 2025-07-07 RX ORDER — PROPOFOL 10 MG/ML
INJECTION, EMULSION INTRAVENOUS PRN
Status: DISCONTINUED | OUTPATIENT
Start: 2025-07-07 | End: 2025-07-07

## 2025-07-07 RX ORDER — OXYCODONE HYDROCHLORIDE 5 MG/1
10 TABLET ORAL
Status: DISCONTINUED | OUTPATIENT
Start: 2025-07-07 | End: 2025-07-07 | Stop reason: HOSPADM

## 2025-07-07 RX ORDER — CEFAZOLIN SODIUM/WATER 2 G/20 ML
2 SYRINGE (ML) INTRAVENOUS SEE ADMIN INSTRUCTIONS
Status: DISCONTINUED | OUTPATIENT
Start: 2025-07-07 | End: 2025-07-07 | Stop reason: HOSPADM

## 2025-07-07 RX ORDER — ACETAMINOPHEN 325 MG/1
975 TABLET ORAL ONCE
Status: DISCONTINUED | OUTPATIENT
Start: 2025-07-07 | End: 2025-07-07 | Stop reason: HOSPADM

## 2025-07-07 RX ORDER — DEXAMETHASONE SODIUM PHOSPHATE 4 MG/ML
4 INJECTION, SOLUTION INTRA-ARTICULAR; INTRALESIONAL; INTRAMUSCULAR; INTRAVENOUS; SOFT TISSUE
Status: DISCONTINUED | OUTPATIENT
Start: 2025-07-07 | End: 2025-07-07 | Stop reason: HOSPADM

## 2025-07-07 RX ORDER — HYDROMORPHONE HCL IN WATER/PF 6 MG/30 ML
0.4 PATIENT CONTROLLED ANALGESIA SYRINGE INTRAVENOUS EVERY 5 MIN PRN
Status: DISCONTINUED | OUTPATIENT
Start: 2025-07-07 | End: 2025-07-07 | Stop reason: HOSPADM

## 2025-07-07 RX ORDER — SODIUM CHLORIDE, SODIUM LACTATE, POTASSIUM CHLORIDE, CALCIUM CHLORIDE 600; 310; 30; 20 MG/100ML; MG/100ML; MG/100ML; MG/100ML
INJECTION, SOLUTION INTRAVENOUS CONTINUOUS
Status: DISCONTINUED | OUTPATIENT
Start: 2025-07-07 | End: 2025-07-07 | Stop reason: HOSPADM

## 2025-07-07 RX ORDER — LABETALOL HYDROCHLORIDE 5 MG/ML
10 INJECTION, SOLUTION INTRAVENOUS
Status: DISCONTINUED | OUTPATIENT
Start: 2025-07-07 | End: 2025-07-07 | Stop reason: HOSPADM

## 2025-07-07 RX ORDER — ONDANSETRON 4 MG/1
4 TABLET, ORALLY DISINTEGRATING ORAL EVERY 30 MIN PRN
Status: DISCONTINUED | OUTPATIENT
Start: 2025-07-07 | End: 2025-07-07 | Stop reason: HOSPADM

## 2025-07-07 RX ORDER — ALBUTEROL SULFATE 0.83 MG/ML
2.5 SOLUTION RESPIRATORY (INHALATION) EVERY 4 HOURS PRN
Status: DISCONTINUED | OUTPATIENT
Start: 2025-07-07 | End: 2025-07-07 | Stop reason: HOSPADM

## 2025-07-07 RX ORDER — FENTANYL CITRATE 50 UG/ML
INJECTION, SOLUTION INTRAMUSCULAR; INTRAVENOUS PRN
Status: DISCONTINUED | OUTPATIENT
Start: 2025-07-07 | End: 2025-07-07

## 2025-07-07 RX ORDER — LIDOCAINE HYDROCHLORIDE 20 MG/ML
INJECTION, SOLUTION INFILTRATION; PERINEURAL PRN
Status: DISCONTINUED | OUTPATIENT
Start: 2025-07-07 | End: 2025-07-07

## 2025-07-07 RX ORDER — MEPERIDINE HYDROCHLORIDE 25 MG/ML
12.5 INJECTION INTRAMUSCULAR; INTRAVENOUS; SUBCUTANEOUS EVERY 5 MIN PRN
Status: DISCONTINUED | OUTPATIENT
Start: 2025-07-07 | End: 2025-07-07 | Stop reason: HOSPADM

## 2025-07-07 RX ORDER — ONDANSETRON 2 MG/ML
INJECTION INTRAMUSCULAR; INTRAVENOUS PRN
Status: DISCONTINUED | OUTPATIENT
Start: 2025-07-07 | End: 2025-07-07

## 2025-07-07 RX ORDER — LIDOCAINE 40 MG/G
CREAM TOPICAL
Status: DISCONTINUED | OUTPATIENT
Start: 2025-07-07 | End: 2025-07-07 | Stop reason: HOSPADM

## 2025-07-07 RX ORDER — FENTANYL CITRATE 50 UG/ML
50 INJECTION, SOLUTION INTRAMUSCULAR; INTRAVENOUS EVERY 5 MIN PRN
Status: DISCONTINUED | OUTPATIENT
Start: 2025-07-07 | End: 2025-07-07 | Stop reason: HOSPADM

## 2025-07-07 RX ORDER — ACETAMINOPHEN 325 MG/1
975 TABLET ORAL ONCE
Status: COMPLETED | OUTPATIENT
Start: 2025-07-07 | End: 2025-07-07

## 2025-07-07 RX ORDER — OXYCODONE HYDROCHLORIDE 5 MG/1
5 TABLET ORAL
Status: DISCONTINUED | OUTPATIENT
Start: 2025-07-07 | End: 2025-07-07 | Stop reason: HOSPADM

## 2025-07-07 RX ADMIN — PROPOFOL 150 MG: 10 INJECTION, EMULSION INTRAVENOUS at 12:13

## 2025-07-07 RX ADMIN — SODIUM CHLORIDE, SODIUM LACTATE, POTASSIUM CHLORIDE, AND CALCIUM CHLORIDE: .6; .31; .03; .02 INJECTION, SOLUTION INTRAVENOUS at 12:06

## 2025-07-07 RX ADMIN — SUGAMMADEX 50 MG: 100 INJECTION, SOLUTION INTRAVENOUS at 12:38

## 2025-07-07 RX ADMIN — ACETAMINOPHEN 975 MG: 325 TABLET ORAL at 11:07

## 2025-07-07 RX ADMIN — Medication 2 G: at 12:06

## 2025-07-07 RX ADMIN — LIDOCAINE HYDROCHLORIDE 40 MG: 20 INJECTION, SOLUTION INFILTRATION; PERINEURAL at 12:13

## 2025-07-07 RX ADMIN — PHENYLEPHRINE HYDROCHLORIDE 150 MCG: 10 INJECTION INTRAVENOUS at 12:21

## 2025-07-07 RX ADMIN — ROCURONIUM BROMIDE 20 MG: 50 INJECTION, SOLUTION INTRAVENOUS at 12:22

## 2025-07-07 RX ADMIN — FENTANYL CITRATE 100 MCG: 50 INJECTION INTRAMUSCULAR; INTRAVENOUS at 12:10

## 2025-07-07 RX ADMIN — ONDANSETRON 4 MG: 2 INJECTION INTRAMUSCULAR; INTRAVENOUS at 12:20

## 2025-07-07 RX ADMIN — SODIUM CHLORIDE, SODIUM LACTATE, POTASSIUM CHLORIDE, AND CALCIUM CHLORIDE: .6; .31; .03; .02 INJECTION, SOLUTION INTRAVENOUS at 11:08

## 2025-07-07 RX ADMIN — PHENYLEPHRINE HYDROCHLORIDE 150 MCG: 10 INJECTION INTRAVENOUS at 12:23

## 2025-07-07 RX ADMIN — LABETALOL HYDROCHLORIDE 10 MG: 5 INJECTION, SOLUTION INTRAVENOUS at 14:00

## 2025-07-07 RX ADMIN — DEXAMETHASONE SODIUM PHOSPHATE 4 MG: 4 INJECTION, SOLUTION INTRA-ARTICULAR; INTRALESIONAL; INTRAMUSCULAR; INTRAVENOUS; SOFT TISSUE at 12:20

## 2025-07-07 ASSESSMENT — ACTIVITIES OF DAILY LIVING (ADL)
ADLS_ACUITY_SCORE: 51
ADLS_ACUITY_SCORE: 52
ADLS_ACUITY_SCORE: 49
ADLS_ACUITY_SCORE: 49

## 2025-07-07 NOTE — ANESTHESIA PREPROCEDURE EVALUATION
Anesthesia Pre-Procedure Evaluation    Patient: Issac Jackson   MRN: [id number] : 1955          Procedure : Procedure(s):  Cystoscopy with transurethral resection bladder tumor  Bilateral retrograde pyelograms, possible bilateral stent placementsI         Past Medical History:   Diagnosis Date    Benign essential hypertension 2024    Cancer (H) 2025    Coronary artery disease involving native coronary artery of native heart without angina pectoris 2024    Hyperlipidemia LDL goal <70 2024    Hypertension     Ischemic cardiomyopathy 2017      Past Surgical History:   Procedure Laterality Date    ABDOMEN SURGERY  1964    BIOPSY  2025    CYSTOSCOPY, FULGURATE BLEEDERS, EVACUATE CLOT(S), COMBINED N/A 2024    Procedure: CYSTOSCOPY, WITH FULGURATION OF HEMORRHAGING BLOOD VESSEL AND THROMBUS REMOVAL;  Surgeon: Noah Adame MD;  Location: [facility] OR    CYSTOSCOPY, TRANSURETHRAL RESECTION (TUR) TUMOR BLADDER, COMBINED N/A 2025    Procedure: Cystoscopy, transurethral resection of the bladder tumor, large, total area greater than 5 cm;  Surgeon: Kaden Alexander MD;  Location: [facility] OR    EYE SURGERY      HERNIA REPAIR        No Known Allergies   Social History     Tobacco Use    Smoking status: Former     Current packs/day: 0.00     Average packs/day: 1.5 packs/day for 25.0 years (37.5 ttl pk-yrs)     Types: Cigarettes     Quit date: 1997     Years since quittin.8    Smokeless tobacco: Never   Substance Use Topics    Alcohol use: Not Currently      Wt Readings from Last 1 Encounters:   25 91.8 kg (202 lb 6.4 oz)        Anesthesia Evaluation            ROS/MED HX  ENT/Pulmonary:       Neurologic:       Cardiovascular:     (+)  hypertension- -  CAD -  - stent- 2                                     METS/Exercise Tolerance:     Hematologic:       Musculoskeletal:       GI/Hepatic:       Renal/Genitourinary:       Endo:       Psychiatric/Substance Use:      "  Infectious Disease:       Malignancy:       Other:              Physical Exam  Airway  Mallampati: II  TM distance: >3 FB  Neck ROM: full    Cardiovascular   Rhythm: regular     Dental   (+) Multiple crowns, permanent bridges      Pulmonary Breath sounds clear to auscultation        Neurological   He appears awake and alert.    Other Findings       OUTSIDE LABS:  CBC:   Lab Results   Component Value Date    WBC 10.1 06/20/2025    WBC 8.3 03/04/2025    HGB 14.4 06/20/2025    HGB 14.7 03/04/2025    HCT 43.4 06/20/2025    HCT 44.9 03/04/2025     06/20/2025     03/04/2025     BMP:   Lab Results   Component Value Date     06/20/2025     03/04/2025    POTASSIUM 4.8 06/20/2025    POTASSIUM 4.1 03/04/2025    CHLORIDE 104 06/20/2025    CHLORIDE 103 03/04/2025    CO2 24 06/20/2025    CO2 27 03/04/2025    BUN 25.6 (H) 06/20/2025    BUN 22.7 03/04/2025    CR 1.36 (H) 06/20/2025    CR 1.5 (H) 05/01/2025    GLC 99 06/20/2025    GLC 88 03/04/2025     COAGS:   Lab Results   Component Value Date    INR 0.88 10/05/2016     POC: No results found for: \"BGM\", \"HCG\", \"HCGS\"  HEPATIC:   Lab Results   Component Value Date    ALBUMIN 3.8 07/21/2024    PROTTOTAL 6.2 (L) 07/21/2024    ALT 28 03/04/2025    AST 20 07/21/2024    ALKPHOS 103 07/21/2024    BILITOTAL 0.8 07/21/2024     OTHER:   Lab Results   Component Value Date    JONELLE 10.3 06/20/2025    MAG 2.0 11/09/2023       Anesthesia Plan    ASA Status:  3      NPO Status: NPO Appropriate   Anesthesia Type: General.  Airway: supraglottic airway.  Induction: intravenous.  Maintenance: Balanced.   Techniques and Equipment:     - Airway:  Planned airway equipment includes supraglottic airway.     - Monitoring Plan: standard ASA monitoring     Consents    Anesthesia Plan(s) and associated risks, benefits, and realistic alternatives discussed. Questions answered and patient/representative(s) expressed understanding.     - Discussed:     - Discussed with:  Patient        " "       Postoperative Care    Pain management: multimodal analgesia.     Comments:    Other Comments: No versed.               Aura Ho MD    I have reviewed the pertinent notes and labs in the chart from the past 30 days and (re)examined the patient.  Any updates or changes from those notes are reflected in this note.    Clinically Significant Risk Factors Present on Admission                 # Drug Induced Platelet Defect: home medication list includes an antiplatelet medication   # Hypertension: Noted on problem list           # Overweight: Estimated body mass index is 29.88 kg/m  as calculated from the following:    Height as of this encounter: 1.753 m (5' 9.02\").    Weight as of this encounter: 91.8 kg (202 lb 6.4 oz).                    "

## 2025-07-07 NOTE — ANESTHESIA POSTPROCEDURE EVALUATION
Patient: Issac Jackson    Procedure: Procedure(s):  Cystoscopy with transurethral resection bladder tumor  Bilateral retrograde pyelograms       Anesthesia Type:  General    Note:  Disposition: Outpatient   Postop Pain Control: Uneventful            Sign Out: Well controlled pain   PONV: No   Neuro/Psych: Uneventful            Sign Out: Acceptable/Baseline neuro status   Airway/Respiratory: Uneventful            Sign Out: Acceptable/Baseline resp. status   CV/Hemodynamics: Uneventful            Sign Out: Acceptable CV status; No obvious hypovolemia; No obvious fluid overload   Other NRE: NONE   DID A NON-ROUTINE EVENT OCCUR?            Last vitals:  Vitals Value Taken Time   /90 07/07/25 13:15   Temp 96.8  F (36  C) 07/07/25 13:11   Pulse 62 07/07/25 13:15   Resp 14 07/07/25 13:11   SpO2 97 % 07/07/25 13:21   Vitals shown include unfiled device data.    Electronically Signed By: Aura Ho MD  July 7, 2025  1:33 PM

## 2025-07-07 NOTE — PROVIDER NOTIFICATION
Dr. Ho notified regarding elevated blood pressure in Phase II.  Patient denies headache/vision changes.  10 mg Labetolol ordered and given.  Recheck 162/92 - ok to discharge home per provider.

## 2025-07-07 NOTE — DISCHARGE INSTRUCTIONS
Maximum acetaminophen (Tylenol) dose from all sources should not exceed 4 grams (4000 mg) per day. You last had 975mg at 11 a.m., your next dose is 5 p.m. or later.    Patient is to remove his own Jansen catheter at home on Wednesday morning July 9.     DR. ABA KNIGHT   CLINIC PHONE NUMBER:  [phone]  [facility]

## 2025-07-07 NOTE — ANESTHESIA CARE TRANSFER NOTE
Patient: Issac Jackson    Procedure: Procedure(s):  Cystoscopy with transurethral resection bladder tumor  Bilateral retrograde pyelograms       Diagnosis: Bladder tumor [D49.4]  Diagnosis Additional Information: No value filed.    Anesthesia Type:   General     Note:    Oropharynx: oropharynx clear of all foreign objects  Level of Consciousness: awake  Oxygen Supplementation: face mask  Level of Supplemental Oxygen (L/min / FiO2): 6  Independent Airway: airway patency satisfactory and stable  Dentition: dentition unchanged  Vital Signs Stable: post-procedure vital signs reviewed and stable  Report to RN Given: handoff report given  Patient transferred to: PACU    Handoff Report: Identifed the Patient, Identified the Reponsible Provider, Reviewed the pertinent medical history, Discussed the surgical course, Reviewed Intra-OP anesthesia mangement and issues during anesthesia, Set expectations for post-procedure period and Allowed opportunity for questions and acknowledgement of understanding      Vitals:  Vitals Value Taken Time   /86 07/07/25 12:44   Temp 95.9  F (35.5  C) 07/07/25 12:46   Pulse 68 07/07/25 12:46   Resp 11 07/07/25 12:46   SpO2 99 % 07/07/25 12:46   Vitals shown include unfiled device data.    Electronically Signed By: RY Gamble CRNA  July 7, 2025  12:47 PM

## 2025-07-07 NOTE — OP NOTE
SURGEON:  Kaden Alexander MD     PREOPERATIVE DIAGNOSIS: Bladder tumor     POSTOPERATIVE DIAGNOSIS: Bladder tumor     PROCEDURE PERFORMED: Cystoscopy, transurethral resection of the bladder tumor, large, greater than 5 cm, bilateral retrograde pyelograms, interpretation of fluoroscopic images     ANESTHESIA:  General.     COMPLICATIONS:  None     INDICATIONS FOR PROCEDURE: This is a 69-year-old gentleman who previously had a TURBT for a T1 high-grade tumor.  He now presents for repeat resection.    DETAILS OF PROCEDURE: The risks and benefits of the procedure were explained in detail the patient and informed consent was obtained.  The patient was brought to the operating room and placed supine on the operating table where he underwent general endotracheal anesthetic.  He was then moved down to the dorsal lithotomy position where he was prepped and draped in the standard sterile fashion.    The procedure began by using the visual obturator to introduce the 26 Guatemalan resectoscope sheath into the bladder.  I performed a complete cystoscopy. I Identified each ureteral orifice.  I could see the areas of scarring above each ureteral orifice as well as the scar on the right side of the bladder.  I first cannulated the left ureter orifice with a ureteral catheter and performed retrograde pyelogram.  This was found to be normal with no filling defects or dilatations present.  I then cannulated the right ureteral orifice with a ureteral catheter and perform directed pyelogram.  This was also found to be normal with no filling defects or dilatations present.  I removed the ureteral catheter.  I did not need to place a stent on either side.    I then used the cautery loop to resect out all areas of scarring from his previous resection.  I then went deeper into the muscle and slightly more wide around each resection.  There was minimal bleeding with this, I used the cautery function to achieve hemostasis.  I flushed out all  specimen.  I drained the bladder with a 20 Khmer coudé tip Jansen catheter.      The patient tolerated the procedure well without complications.  He went to the postanesthetic care unit in good condition.  He will remove his own Jansen catheter in 2 days.

## 2025-07-09 ENCOUNTER — CARE COORDINATION (OUTPATIENT)
Dept: UROLOGY | Facility: CLINIC | Age: 70
End: 2025-07-09
Payer: COMMERCIAL

## 2025-07-11 ENCOUNTER — TELEPHONE (OUTPATIENT)
Dept: CARDIOLOGY | Facility: CLINIC | Age: 70
End: 2025-07-11
Payer: COMMERCIAL

## 2025-07-11 ENCOUNTER — TELEPHONE (OUTPATIENT)
Dept: UROLOGY | Facility: CLINIC | Age: 70
End: 2025-07-11
Payer: COMMERCIAL

## 2025-07-11 DIAGNOSIS — I25.10 CORONARY ARTERY DISEASE INVOLVING NATIVE CORONARY ARTERY OF NATIVE HEART WITHOUT ANGINA PECTORIS: Primary | ICD-10-CM

## 2025-07-11 DIAGNOSIS — I25.5 ISCHEMIC CARDIOMYOPATHY: ICD-10-CM

## 2025-07-11 PROBLEM — C67.9 BLADDER CANCER (H): Status: ACTIVE | Noted: 2025-07-11

## 2025-07-11 LAB
PATH REPORT.COMMENTS IMP SPEC: NORMAL
PATH REPORT.FINAL DX SPEC: NORMAL
PATH REPORT.GROSS SPEC: NORMAL
PATH REPORT.MICROSCOPIC SPEC OTHER STN: NORMAL
PATH REPORT.RELEVANT HX SPEC: NORMAL
PHOTO IMAGE: NORMAL

## 2025-07-11 NOTE — TELEPHONE ENCOUNTER
"Patient has been taking just aspirin 81 mg daily, instead of recommended  DAPT indefinitely due to hematuria with bladder cancer. Patient underwent a bladder tumor resection on 7/7/25. Patient reports speaking with his Urologist Dr. Alexander this morning regarding pathology reports, and told patient to resume plavix and watch for blood in urine. Hgb 14.4 as of 6/20/25.     Patient would like Dr. Mac to be aware that he was given the okay to resume DAPT with aspirin 81 mg daily and plavix 75 mg daily.     Per Dr. Mac's LOV note on 4/9/25:    Assessment and Plan/Recommendations:     # CAD with inferior STEMI 2016 s/p PCI to RCA, LAD, known LCx  and RPLA branch  with collaterals. No angina  # Hematuria, now on aspirin monotherapy instead of DAPT   # HTN, BP elevated, some component of \"white coat hypertension\"  # HL, on statin   # AAA, 3cm  # CKD  # Mild-mod MR TTE 11/2023     -From a cardiac perspective patient is doing well without symptoms concerning for angina or decompensated heart failure, however his blood pressures are elevated.  Will change metoprolol succinate to carvedilol 12.5 mg twice daily.  Advised to monitor for signs/symptoms of bradycardia or hypotension.  Goal BP less than 130/80 mmHg  - TTE in 1 year  - Abdominal aortic ultrasound in 1 year  - Reviewed activity/weight lifting restrictions  - Continue remainder of cardiac regimen of aspirin, atorvastatin, lisinopril, sublingual nitroglycerin as needed  - Labs in 1 year with follow-up at that time, or sooner as needed       "

## 2025-07-11 NOTE — TELEPHONE ENCOUNTER
Patient will be starting  induction BCG treatments in mid August . BCG handout reviewed    with  patient   with questions  including side effects  and when to seek emergency care .  Bleaching protocol  reviewed  and discussed antibiotic  post treatment . All  questions answered . BCG handout will be mailed for him to review and  call back with any questions once he reviews the handout .

## 2025-07-14 DIAGNOSIS — C67.9 MALIGNANT NEOPLASM OF URINARY BLADDER, UNSPECIFIED SITE (H): Primary | ICD-10-CM

## 2025-07-14 RX ORDER — CLOPIDOGREL BISULFATE 75 MG/1
75 TABLET ORAL DAILY
Qty: 90 TABLET | Refills: 3 | Status: SHIPPED | OUTPATIENT
Start: 2025-07-14

## 2025-07-14 RX ORDER — LEVOFLOXACIN 500 MG/1
TABLET, FILM COATED ORAL
Qty: 12 TABLET | Refills: 0 | Status: SHIPPED | OUTPATIENT
Start: 2025-07-14

## 2025-07-14 NOTE — TELEPHONE ENCOUNTER
Kraig Mac MD to Me  [facility] 2     7/14/25  8:22 AM  Yes with repeat CBC in 1 mo thanks      Me to Kraig Mac MD  [facility] 2       7/11/25 12:14 PM  Plavix 75 mg daily has been removed from med list. Okay to reorder as patient has been advised to begin again per Urology?     =============================================    Patient called to inform of above recommendations. Patient verbalizes understanding. Instructed to call scheduling to make lab appointment win 1 month.

## 2025-07-18 ENCOUNTER — HOSPITAL ENCOUNTER (OUTPATIENT)
Facility: CLINIC | Age: 70
Setting detail: OBSERVATION
Discharge: HOME | End: 2025-07-19
Attending: EMERGENCY MEDICINE | Admitting: INTERNAL MEDICINE
Payer: COMMERCIAL

## 2025-07-18 DIAGNOSIS — R31.0 GROSS HEMATURIA: ICD-10-CM

## 2025-07-18 DIAGNOSIS — D62 ANEMIA DUE TO BLOOD LOSS, ACUTE: ICD-10-CM

## 2025-07-18 DIAGNOSIS — C67.9 MALIGNANT NEOPLASM OF URINARY BLADDER, UNSPECIFIED SITE (H): Primary | ICD-10-CM

## 2025-07-18 LAB
ALBUMIN UR-MCNC: ABNORMAL G/DL
ANION GAP SERPL CALCULATED.3IONS-SCNC: 11 MMOL/L (ref 7–15)
APPEARANCE UR: ABNORMAL
BASOPHILS # BLD AUTO: 0.1 10E3/UL (ref 0–0.2)
BASOPHILS NFR BLD AUTO: 1 %
BILIRUB UR QL STRIP: ABNORMAL
BUN SERPL-MCNC: 23 MG/DL (ref 8–23)
CALCIUM SERPL-MCNC: 9.1 MG/DL (ref 8.8–10.4)
CHLORIDE SERPL-SCNC: 99 MMOL/L (ref 98–107)
COLOR UR AUTO: ABNORMAL
CREAT SERPL-MCNC: 1.19 MG/DL (ref 0.67–1.17)
EGFRCR SERPLBLD CKD-EPI 2021: 66 ML/MIN/1.73M2
EOSINOPHIL # BLD AUTO: 0.3 10E3/UL (ref 0–0.7)
EOSINOPHIL NFR BLD AUTO: 2 %
ERYTHROCYTE [DISTWIDTH] IN BLOOD BY AUTOMATED COUNT: 12.4 % (ref 10–15)
GLUCOSE SERPL-MCNC: 106 MG/DL (ref 70–99)
GLUCOSE UR STRIP-MCNC: ABNORMAL MG/DL
HCO3 SERPL-SCNC: 22 MMOL/L (ref 22–29)
HCT VFR BLD AUTO: 37.5 % (ref 40–53)
HGB BLD-MCNC: 12.7 G/DL (ref 13.3–17.7)
HGB BLD-MCNC: 12.8 G/DL (ref 13.3–17.7)
HGB UR QL STRIP: ABNORMAL
HIV 1+2 AB+HIV1P24 AG SERPLBLD IA.RAPID: NON REACTIVE
HIV 1+2 AB+HIV1P24 AG SERPLBLD IA.RAPID: NON REACTIVE
HIV INTERPRETATION: NORMAL
IMM GRANULOCYTES # BLD: 0.1 10E3/UL
IMM GRANULOCYTES NFR BLD: 1 %
KETONES UR STRIP-MCNC: ABNORMAL MG/DL
LEUKOCYTE ESTERASE UR QL STRIP: ABNORMAL
LYMPHOCYTES # BLD AUTO: 1.9 10E3/UL (ref 0.8–5.3)
LYMPHOCYTES NFR BLD AUTO: 15 %
MCH RBC QN AUTO: 30.1 PG (ref 26.5–33)
MCHC RBC AUTO-ENTMCNC: 34.1 G/DL (ref 31.5–36.5)
MCV RBC AUTO: 88 FL (ref 78–100)
MCV RBC AUTO: 89 FL (ref 78–100)
MONOCYTES # BLD AUTO: 1.1 10E3/UL (ref 0–1.3)
MONOCYTES NFR BLD AUTO: 8 %
NEUTROPHILS # BLD AUTO: 9.7 10E3/UL (ref 1.6–8.3)
NEUTROPHILS NFR BLD AUTO: 74 %
NITRATE UR QL: ABNORMAL
NRBC # BLD AUTO: 0 10E3/UL
NRBC BLD AUTO-RTO: 0 /100
PH UR STRIP: ABNORMAL [PH]
PLATELET # BLD AUTO: 326 10E3/UL (ref 150–450)
POTASSIUM SERPL-SCNC: 4.1 MMOL/L (ref 3.4–5.3)
PROCALCITONIN SERPL IA-MCNC: 0.06 NG/ML
RBC # BLD AUTO: 4.25 10E6/UL (ref 4.4–5.9)
RBC URINE: >182 /HPF
SODIUM SERPL-SCNC: 132 MMOL/L (ref 135–145)
SP GR UR STRIP: ABNORMAL
UROBILINOGEN UR STRIP-MCNC: ABNORMAL MG/DL
WBC # BLD AUTO: 13.1 10E3/UL (ref 4–11)
WBC URINE: 23 /HPF

## 2025-07-18 PROCEDURE — 85018 HEMOGLOBIN: CPT

## 2025-07-18 PROCEDURE — 258N000001 HC RX 258: Performed by: EMERGENCY MEDICINE

## 2025-07-18 PROCEDURE — 250N000013 HC RX MED GY IP 250 OP 250 PS 637

## 2025-07-18 PROCEDURE — 250N000025 HC SEVOFLURANE, PER MIN: Performed by: UROLOGY

## 2025-07-18 PROCEDURE — 370N000017 HC ANESTHESIA TECHNICAL FEE, PER MIN: Performed by: UROLOGY

## 2025-07-18 PROCEDURE — 360N000075 HC SURGERY LEVEL 2, PER MIN: Performed by: UROLOGY

## 2025-07-18 PROCEDURE — 81001 URINALYSIS AUTO W/SCOPE: CPT | Performed by: EMERGENCY MEDICINE

## 2025-07-18 PROCEDURE — 99223 1ST HOSP IP/OBS HIGH 75: CPT

## 2025-07-18 PROCEDURE — 272N000001 HC OR GENERAL SUPPLY STERILE: Performed by: UROLOGY

## 2025-07-18 PROCEDURE — 250N000013 HC RX MED GY IP 250 OP 250 PS 637: Performed by: UROLOGY

## 2025-07-18 PROCEDURE — 52214 CYSTOSCOPY AND TREATMENT: CPT | Performed by: UROLOGY

## 2025-07-18 PROCEDURE — 36415 COLL VENOUS BLD VENIPUNCTURE: CPT

## 2025-07-18 PROCEDURE — 80048 BASIC METABOLIC PNL TOTAL CA: CPT | Performed by: EMERGENCY MEDICINE

## 2025-07-18 PROCEDURE — 52001 CYSTO W/IRRG&EVAC MLT CLOTS: CPT | Mod: 59 | Performed by: UROLOGY

## 2025-07-18 PROCEDURE — 999N000141 HC STATISTIC PRE-PROCEDURE NURSING ASSESSMENT: Performed by: UROLOGY

## 2025-07-18 PROCEDURE — 99285 EMERGENCY DEPT VISIT HI MDM: CPT

## 2025-07-18 PROCEDURE — G0378 HOSPITAL OBSERVATION PER HR: HCPCS

## 2025-07-18 PROCEDURE — 250N000009 HC RX 250: Performed by: UROLOGY

## 2025-07-18 PROCEDURE — 99214 OFFICE O/P EST MOD 30 MIN: CPT | Mod: 25 | Performed by: UROLOGY

## 2025-07-18 PROCEDURE — 36415 COLL VENOUS BLD VENIPUNCTURE: CPT | Performed by: EMERGENCY MEDICINE

## 2025-07-18 PROCEDURE — 84145 PROCALCITONIN (PCT): CPT

## 2025-07-18 PROCEDURE — 85004 AUTOMATED DIFF WBC COUNT: CPT | Performed by: EMERGENCY MEDICINE

## 2025-07-18 PROCEDURE — 87086 URINE CULTURE/COLONY COUNT: CPT | Performed by: EMERGENCY MEDICINE

## 2025-07-18 PROCEDURE — 710N000009 HC RECOVERY PHASE 1, LEVEL 1, PER MIN: Performed by: UROLOGY

## 2025-07-18 PROCEDURE — 999N000104 HC STATISTIC NO CHARGE: Performed by: INTERNAL MEDICINE

## 2025-07-18 RX ORDER — CEFAZOLIN SODIUM/WATER 2 G/20 ML
2 SYRINGE (ML) INTRAVENOUS SEE ADMIN INSTRUCTIONS
Status: DISCONTINUED | OUTPATIENT
Start: 2025-07-18 | End: 2025-07-18 | Stop reason: HOSPADM

## 2025-07-18 RX ORDER — LISINOPRIL 40 MG/1
40 TABLET ORAL DAILY
Status: DISCONTINUED | OUTPATIENT
Start: 2025-07-19 | End: 2025-07-19 | Stop reason: HOSPADM

## 2025-07-18 RX ORDER — MAGNESIUM SULFATE HEPTAHYDRATE 40 MG/ML
2 INJECTION, SOLUTION INTRAVENOUS
Status: DISCONTINUED | OUTPATIENT
Start: 2025-07-18 | End: 2025-07-18 | Stop reason: HOSPADM

## 2025-07-18 RX ORDER — HYDRALAZINE HYDROCHLORIDE 20 MG/ML
5-10 INJECTION INTRAMUSCULAR; INTRAVENOUS EVERY 10 MIN PRN
Status: DISCONTINUED | OUTPATIENT
Start: 2025-07-18 | End: 2025-07-18 | Stop reason: HOSPADM

## 2025-07-18 RX ORDER — ASPIRIN 81 MG/1
81 TABLET, CHEWABLE ORAL DAILY
COMMUNITY

## 2025-07-18 RX ORDER — DEXAMETHASONE SODIUM PHOSPHATE 4 MG/ML
4 INJECTION, SOLUTION INTRA-ARTICULAR; INTRALESIONAL; INTRAMUSCULAR; INTRAVENOUS; SOFT TISSUE
Status: DISCONTINUED | OUTPATIENT
Start: 2025-07-18 | End: 2025-07-18 | Stop reason: HOSPADM

## 2025-07-18 RX ORDER — LIDOCAINE HYDROCHLORIDE 20 MG/ML
JELLY TOPICAL
Status: COMPLETED | OUTPATIENT
Start: 2025-07-18 | End: 2025-07-18

## 2025-07-18 RX ORDER — PROCHLORPERAZINE MALEATE 5 MG/1
5 TABLET ORAL EVERY 6 HOURS PRN
Status: DISCONTINUED | OUTPATIENT
Start: 2025-07-18 | End: 2025-07-19 | Stop reason: HOSPADM

## 2025-07-18 RX ORDER — FENTANYL CITRATE 50 UG/ML
50 INJECTION, SOLUTION INTRAMUSCULAR; INTRAVENOUS EVERY 5 MIN PRN
Status: DISCONTINUED | OUTPATIENT
Start: 2025-07-18 | End: 2025-07-18 | Stop reason: HOSPADM

## 2025-07-18 RX ORDER — NALOXONE HYDROCHLORIDE 0.4 MG/ML
0.4 INJECTION, SOLUTION INTRAMUSCULAR; INTRAVENOUS; SUBCUTANEOUS
Status: DISCONTINUED | OUTPATIENT
Start: 2025-07-18 | End: 2025-07-19 | Stop reason: HOSPADM

## 2025-07-18 RX ORDER — OXYBUTYNIN CHLORIDE 5 MG/1
5 TABLET, EXTENDED RELEASE ORAL DAILY
Status: DISCONTINUED | OUTPATIENT
Start: 2025-07-19 | End: 2025-07-18

## 2025-07-18 RX ORDER — CARVEDILOL 12.5 MG/1
12.5 TABLET ORAL 2 TIMES DAILY WITH MEALS
Status: DISCONTINUED | OUTPATIENT
Start: 2025-07-18 | End: 2025-07-19 | Stop reason: HOSPADM

## 2025-07-18 RX ORDER — AMOXICILLIN 250 MG
2 CAPSULE ORAL 2 TIMES DAILY PRN
Status: DISCONTINUED | OUTPATIENT
Start: 2025-07-18 | End: 2025-07-19 | Stop reason: HOSPADM

## 2025-07-18 RX ORDER — ONDANSETRON 4 MG/1
4 TABLET, ORALLY DISINTEGRATING ORAL EVERY 30 MIN PRN
Status: DISCONTINUED | OUTPATIENT
Start: 2025-07-18 | End: 2025-07-18 | Stop reason: HOSPADM

## 2025-07-18 RX ORDER — OXYCODONE HYDROCHLORIDE 5 MG/1
5 TABLET ORAL
Status: DISCONTINUED | OUTPATIENT
Start: 2025-07-18 | End: 2025-07-18 | Stop reason: HOSPADM

## 2025-07-18 RX ORDER — FENTANYL CITRATE 50 UG/ML
25 INJECTION, SOLUTION INTRAMUSCULAR; INTRAVENOUS EVERY 5 MIN PRN
Status: DISCONTINUED | OUTPATIENT
Start: 2025-07-18 | End: 2025-07-18 | Stop reason: HOSPADM

## 2025-07-18 RX ORDER — LISINOPRIL 40 MG/1
40 TABLET ORAL DAILY
COMMUNITY

## 2025-07-18 RX ORDER — AMLODIPINE BESYLATE 2.5 MG/1
2.5 TABLET ORAL EVERY EVENING
Status: DISCONTINUED | OUTPATIENT
Start: 2025-07-18 | End: 2025-07-19 | Stop reason: HOSPADM

## 2025-07-18 RX ORDER — OXYCODONE HYDROCHLORIDE 5 MG/1
10 TABLET ORAL
Status: DISCONTINUED | OUTPATIENT
Start: 2025-07-18 | End: 2025-07-18 | Stop reason: HOSPADM

## 2025-07-18 RX ORDER — NALOXONE HYDROCHLORIDE 0.4 MG/ML
0.2 INJECTION, SOLUTION INTRAMUSCULAR; INTRAVENOUS; SUBCUTANEOUS
Status: DISCONTINUED | OUTPATIENT
Start: 2025-07-18 | End: 2025-07-19 | Stop reason: HOSPADM

## 2025-07-18 RX ORDER — ALBUTEROL SULFATE 0.83 MG/ML
2.5 SOLUTION RESPIRATORY (INHALATION) EVERY 4 HOURS PRN
Status: DISCONTINUED | OUTPATIENT
Start: 2025-07-18 | End: 2025-07-18 | Stop reason: HOSPADM

## 2025-07-18 RX ORDER — ONDANSETRON 4 MG/1
4 TABLET, ORALLY DISINTEGRATING ORAL EVERY 6 HOURS PRN
Status: DISCONTINUED | OUTPATIENT
Start: 2025-07-18 | End: 2025-07-19 | Stop reason: HOSPADM

## 2025-07-18 RX ORDER — NALOXONE HYDROCHLORIDE 0.4 MG/ML
0.1 INJECTION, SOLUTION INTRAMUSCULAR; INTRAVENOUS; SUBCUTANEOUS
Status: DISCONTINUED | OUTPATIENT
Start: 2025-07-18 | End: 2025-07-18 | Stop reason: HOSPADM

## 2025-07-18 RX ORDER — OXYBUTYNIN CHLORIDE 5 MG/1
5 TABLET, EXTENDED RELEASE ORAL ONCE
Status: DISCONTINUED | OUTPATIENT
Start: 2025-07-18 | End: 2025-07-18

## 2025-07-18 RX ORDER — AMOXICILLIN 250 MG
1 CAPSULE ORAL 2 TIMES DAILY PRN
Status: DISCONTINUED | OUTPATIENT
Start: 2025-07-18 | End: 2025-07-19 | Stop reason: HOSPADM

## 2025-07-18 RX ORDER — KETOROLAC TROMETHAMINE 15 MG/ML
15 INJECTION, SOLUTION INTRAMUSCULAR; INTRAVENOUS
Status: DISCONTINUED | OUTPATIENT
Start: 2025-07-18 | End: 2025-07-18 | Stop reason: HOSPADM

## 2025-07-18 RX ORDER — DIPHENHYDRAMINE HCL 12.5MG/5ML
12.5 LIQUID (ML) ORAL EVERY 6 HOURS PRN
Status: DISCONTINUED | OUTPATIENT
Start: 2025-07-18 | End: 2025-07-18 | Stop reason: HOSPADM

## 2025-07-18 RX ORDER — ACETAMINOPHEN 325 MG/1
975 TABLET ORAL ONCE
Status: COMPLETED | OUTPATIENT
Start: 2025-07-18 | End: 2025-07-18

## 2025-07-18 RX ORDER — ONDANSETRON 2 MG/ML
4 INJECTION INTRAMUSCULAR; INTRAVENOUS EVERY 30 MIN PRN
Status: DISCONTINUED | OUTPATIENT
Start: 2025-07-18 | End: 2025-07-18 | Stop reason: HOSPADM

## 2025-07-18 RX ORDER — POLYETHYLENE GLYCOL 3350 17 G/17G
17 POWDER, FOR SOLUTION ORAL 2 TIMES DAILY PRN
Status: DISCONTINUED | OUTPATIENT
Start: 2025-07-18 | End: 2025-07-19 | Stop reason: HOSPADM

## 2025-07-18 RX ORDER — MORPHINE SULFATE 4 MG/ML
4 INJECTION, SOLUTION INTRAMUSCULAR; INTRAVENOUS
Refills: 0 | Status: DISCONTINUED | OUTPATIENT
Start: 2025-07-18 | End: 2025-07-18

## 2025-07-18 RX ORDER — SODIUM CHLORIDE, SODIUM LACTATE, POTASSIUM CHLORIDE, CALCIUM CHLORIDE 600; 310; 30; 20 MG/100ML; MG/100ML; MG/100ML; MG/100ML
INJECTION, SOLUTION INTRAVENOUS CONTINUOUS
Status: DISCONTINUED | OUTPATIENT
Start: 2025-07-18 | End: 2025-07-18 | Stop reason: HOSPADM

## 2025-07-18 RX ORDER — CEFAZOLIN SODIUM/WATER 2 G/20 ML
2 SYRINGE (ML) INTRAVENOUS
Status: COMPLETED | OUTPATIENT
Start: 2025-07-18 | End: 2025-07-18

## 2025-07-18 RX ORDER — HYDROMORPHONE HYDROCHLORIDE 1 MG/ML
0.5 INJECTION, SOLUTION INTRAMUSCULAR; INTRAVENOUS; SUBCUTANEOUS EVERY 5 MIN PRN
Status: DISCONTINUED | OUTPATIENT
Start: 2025-07-18 | End: 2025-07-18 | Stop reason: HOSPADM

## 2025-07-18 RX ORDER — LABETALOL HYDROCHLORIDE 5 MG/ML
10 INJECTION, SOLUTION INTRAVENOUS
Status: DISCONTINUED | OUTPATIENT
Start: 2025-07-18 | End: 2025-07-18 | Stop reason: HOSPADM

## 2025-07-18 RX ORDER — ACETAMINOPHEN 650 MG/1
650 SUPPOSITORY RECTAL EVERY 4 HOURS PRN
Status: DISCONTINUED | OUTPATIENT
Start: 2025-07-18 | End: 2025-07-19 | Stop reason: HOSPADM

## 2025-07-18 RX ORDER — ATORVASTATIN CALCIUM 40 MG/1
80 TABLET, FILM COATED ORAL DAILY
Status: DISCONTINUED | OUTPATIENT
Start: 2025-07-19 | End: 2025-07-19 | Stop reason: HOSPADM

## 2025-07-18 RX ORDER — ATROPA BELLADONNA AND OPIUM 16.2; 3 MG/1; MG/1
15 SUPPOSITORY RECTAL EVERY 6 HOURS PRN
Refills: 0 | Status: DISCONTINUED | OUTPATIENT
Start: 2025-07-18 | End: 2025-07-19 | Stop reason: HOSPADM

## 2025-07-18 RX ORDER — HYDROMORPHONE HCL IN WATER/PF 6 MG/30 ML
0.2 PATIENT CONTROLLED ANALGESIA SYRINGE INTRAVENOUS
Status: DISCONTINUED | OUTPATIENT
Start: 2025-07-18 | End: 2025-07-19 | Stop reason: HOSPADM

## 2025-07-18 RX ORDER — ACETAMINOPHEN 325 MG/1
650 TABLET ORAL EVERY 4 HOURS PRN
Status: DISCONTINUED | OUTPATIENT
Start: 2025-07-18 | End: 2025-07-19 | Stop reason: HOSPADM

## 2025-07-18 RX ORDER — OXYBUTYNIN CHLORIDE 5 MG/1
5 TABLET, EXTENDED RELEASE ORAL DAILY
Status: DISCONTINUED | OUTPATIENT
Start: 2025-07-18 | End: 2025-07-19 | Stop reason: HOSPADM

## 2025-07-18 RX ORDER — LIDOCAINE 40 MG/G
CREAM TOPICAL
Status: DISCONTINUED | OUTPATIENT
Start: 2025-07-18 | End: 2025-07-19 | Stop reason: HOSPADM

## 2025-07-18 RX ORDER — HYDROMORPHONE HCL IN WATER/PF 6 MG/30 ML
0.4 PATIENT CONTROLLED ANALGESIA SYRINGE INTRAVENOUS
Status: DISCONTINUED | OUTPATIENT
Start: 2025-07-18 | End: 2025-07-19 | Stop reason: HOSPADM

## 2025-07-18 RX ORDER — DIPHENHYDRAMINE HYDROCHLORIDE 50 MG/ML
12.5 INJECTION, SOLUTION INTRAMUSCULAR; INTRAVENOUS EVERY 6 HOURS PRN
Status: DISCONTINUED | OUTPATIENT
Start: 2025-07-18 | End: 2025-07-18 | Stop reason: HOSPADM

## 2025-07-18 RX ORDER — ACETAMINOPHEN 650 MG/1
650 SUPPOSITORY RECTAL ONCE
Status: COMPLETED | OUTPATIENT
Start: 2025-07-18 | End: 2025-07-18

## 2025-07-18 RX ORDER — ONDANSETRON 2 MG/ML
4 INJECTION INTRAMUSCULAR; INTRAVENOUS EVERY 6 HOURS PRN
Status: DISCONTINUED | OUTPATIENT
Start: 2025-07-18 | End: 2025-07-19 | Stop reason: HOSPADM

## 2025-07-18 RX ADMIN — CARVEDILOL 12.5 MG: 12.5 TABLET, FILM COATED ORAL at 21:47

## 2025-07-18 RX ADMIN — OXYBUTYNIN CHLORIDE 5 MG: 5 TABLET, EXTENDED RELEASE ORAL at 16:23

## 2025-07-18 RX ADMIN — SODIUM CHLORIDE: 900 IRRIGANT IRRIGATION at 14:02

## 2025-07-18 RX ADMIN — SODIUM CHLORIDE 3000 ML: 900 IRRIGANT IRRIGATION at 14:58

## 2025-07-18 RX ADMIN — ACETAMINOPHEN 650 MG: 325 TABLET ORAL at 23:17

## 2025-07-18 RX ADMIN — AMLODIPINE BESYLATE 2.5 MG: 2.5 TABLET ORAL at 21:46

## 2025-07-18 RX ADMIN — ACETAMINOPHEN 975 MG: 325 TABLET ORAL at 19:26

## 2025-07-18 ASSESSMENT — ACTIVITIES OF DAILY LIVING (ADL)
ADLS_ACUITY_SCORE: 35
ADLS_ACUITY_SCORE: 59
ADLS_ACUITY_SCORE: 37
ADLS_ACUITY_SCORE: 59
ADLS_ACUITY_SCORE: 59
ADLS_ACUITY_SCORE: 35
ADLS_ACUITY_SCORE: 35
ADLS_ACUITY_SCORE: 59
ADLS_ACUITY_SCORE: 35
ADLS_ACUITY_SCORE: 59
ADLS_ACUITY_SCORE: 35
ADLS_ACUITY_SCORE: 37
ADLS_ACUITY_SCORE: 59

## 2025-07-18 NOTE — PHARMACY-ADMISSION MEDICATION HISTORY
Pharmacy Intern Admission Medication History    Admission medication history is complete. The information provided in this note is only as accurate as the sources available at the time of the update.    Information Source(s): Patient via in-person    Pertinent Information: Patient has a levofloxacin prescription for use post treatment - has not had to use it yet.    Changes made to PTA medication list:  Added: None  Deleted: None  Changed:   Aspirin 81 mg EC tablet --> chew tablet    Allergies reviewed with patient and updates made in EHR: yes    Medication History Completed By: Janet Doty 7/18/2025 2:38 PM    PTA Med List   Medication Sig Last Dose/Taking    amLODIPine (NORVASC) 2.5 MG tablet Take 1 tablet (2.5 mg) by mouth at bedtime. 7/17/2025    aspirin (ASA) 81 MG chewable tablet Take 81 mg by mouth daily. 7/17/2025    atorvastatin (LIPITOR) 80 MG tablet Take 1 tablet (80 mg) by mouth daily. 7/18/2025 Morning    carvedilol (COREG) 12.5 MG tablet Take 1 tablet (12.5 mg) by mouth 2 times daily (with meals). 7/18/2025 Morning    clopidogrel (PLAVIX) 75 MG tablet Take 1 tablet (75 mg) by mouth daily. 7/17/2025    levofloxacin (LEVAQUIN) 500 MG tablet Take 1 tablet 6 hours post treatment and 1 tablet each AM following each treatment. Taking    lisinopril (ZESTRIL) 40 MG tablet Take 40 mg by mouth daily. 7/18/2025 Morning    Multiple Vitamin (MULTIVITAMIN ADULT) TABS Take 1 tablet by mouth daily. 7/17/2025    nitroGLYcerin (NITROSTAT) 0.4 MG sublingual tablet For chest pain place 1 tablet under the tongue every 5 minutes for 3 doses. If symptoms persist 5 minutes after 1st dose call 911. Taking    oxyBUTYnin ER (DITROPAN XL) 5 MG 24 hr tablet Take 1 tablet (5 mg) by mouth daily. Past Month

## 2025-07-18 NOTE — ED PROVIDER NOTES
Emergency Department Note      History of Present Illness     Chief Complaint   Hematuria and Urinary Retention      HPI   Issac Jackson is a 69 year old male on Plavix and ASA 81 MG with a history of hypertension, hyperlipidemia, bladder cancer, gross hematuria, ischemic cardiomyopathy, CAD, and STEMI who presents to the ED for evaluation of hematuria and urinary retention. The patient reports having three tumors removed from his bladder on 6/4/25 followed by a cystoscopy on 7/7/25 where more material was removed to ensure his bladder cancer was gone. Upon receiving promising biopsy results, he was told he could begin taking Plavix again on 7/12/25. He states that last night he had an onset of hematuria with urinary retention around 2000, noting that he passed clots through the night. He mentions passing clots at 0300 this morning with many clots passed when urinating at 0600, further prompting today's visit. He endorses dysuria and continued passing of clots while in the ED today. He denies any fever, chills, abdominal pain, or back pain.     Independent Historian   None    Review of External Notes   I reviewed the urology telephone encounter from earlier today direct the patient to the ER.  I reviewed the procedure note from 7/7/2025 and the patient underwent cystoscopy and transurethral resection of bladder tumor.    Past Medical History     Medical History and Problem List   Hypertension  Hyperlipidemia  Ischemic cardiomyopathy  CAD  Bladder cancer  Atherosclerosis of native coronary artery  STEMI  Stented coronary artery  Gross hematuria    Medications   Norvasc  Lipitor  Coreg  Plavix  Levaquin  Zestril  Ditropan XL  ASA 81 MG    Surgical History   Cystoscopy, fulgurate bleeders, evacuate clot(s), combined  Cystoscopy, retrogrades, combined, bilateral  Cystoscopy, TUR, tumor bladder, combined x2  Eye surgery  Hernia repair    Physical Exam     Patient Vitals for the past 24 hrs:   BP Temp Temp src Pulse  "Resp SpO2 Height Weight   07/18/25 1510 -- -- -- -- -- 94 % -- --   07/18/25 1500 (!) 162/100 -- -- -- -- 96 % -- --   07/18/25 1450 (!) 166/103 -- -- -- -- 95 % -- --   07/18/25 1440 -- -- -- -- -- 93 % -- --   07/18/25 1430 (!) 185/133 -- -- -- -- 97 % -- --   07/18/25 1415 (!) 163/116 -- -- 85 -- 98 % -- --   07/18/25 1410 -- -- -- -- -- 98 % -- --   07/18/25 1405 -- -- -- -- -- 95 % -- --   07/18/25 1400 (!) 153/94 -- -- 68 -- 97 % -- --   07/18/25 1006 (!) 216/110 97.9  F (36.6  C) Temporal 84 18 100 % 1.753 m (5' 9\") 95.5 kg (210 lb 8.6 oz)     Physical Exam  Vitals and nursing note reviewed.   Constitutional:       General: He is not in acute distress.     Appearance: He is not ill-appearing.   HENT:      Head: Normocephalic and atraumatic.      Right Ear: External ear normal.      Left Ear: External ear normal.      Nose: Nose normal.   Eyes:      Conjunctiva/sclera: Conjunctivae normal.   Cardiovascular:      Rate and Rhythm: Normal rate and regular rhythm.      Heart sounds: No murmur heard.  Pulmonary:      Effort: Pulmonary effort is normal. No respiratory distress.      Breath sounds: No wheezing, rhonchi or rales.   Abdominal:      General: Abdomen is flat. There is no distension.      Palpations: Abdomen is soft.      Tenderness: There is no abdominal tenderness. There is no guarding or rebound.   Musculoskeletal:         General: No swelling or deformity.      Cervical back: Normal range of motion and neck supple.   Skin:     General: Skin is warm and dry.      Findings: No rash.   Neurological:      Mental Status: He is alert and oriented to person, place, and time.   Psychiatric:         Behavior: Behavior normal.           Diagnostics     Lab Results   Labs Ordered and Resulted from Time of ED Arrival to Time of ED Departure   ROUTINE UA WITH MICROSCOPIC REFLEX TO CULTURE - Abnormal       Result Value    Color Urine Red (*)     Appearance Urine Cloudy (*)     Glucose Urine        Bilirubin Urine   "      Ketones Urine        Specific Gravity Urine        Blood Urine Large (*)     pH Urine        Protein Albumin Urine        Urobilinogen Urine        Nitrite Urine        Leukocyte Esterase Urine        RBC Urine >182 (*)     WBC Urine 23 (*)    BASIC METABOLIC PANEL - Abnormal    Sodium 132 (*)     Potassium 4.1      Chloride 99      Carbon Dioxide (CO2) 22      Anion Gap 11      Urea Nitrogen 23.0      Creatinine 1.19 (*)     GFR Estimate 66      Calcium 9.1      Glucose 106 (*)    CBC WITH PLATELETS AND DIFFERENTIAL - Abnormal    WBC Count 13.1 (*)     RBC Count 4.25 (*)     Hemoglobin 12.8 (*)     Hematocrit 37.5 (*)     MCV 88      MCH 30.1      MCHC 34.1      RDW 12.4      Platelet Count 326      % Neutrophils 74      % Lymphocytes 15      % Monocytes 8      % Eosinophils 2      % Basophils 1      % Immature Granulocytes 1      NRBCs per 100 WBC 0      Absolute Neutrophils 9.7 (*)     Absolute Lymphocytes 1.9      Absolute Monocytes 1.1      Absolute Eosinophils 0.3      Absolute Basophils 0.1      Absolute Immature Granulocytes 0.1      Absolute NRBCs 0.0     PROCALCITONIN - Normal    Procalcitonin 0.06     URINE CULTURE       Imaging   No orders to display       Independent Interpretation   None    ED Course      Medications Administered   Medications   lidocaine (XYLOCAINE) 2 % external gel (has no administration in time range)   sodium chloride 0.9% irrigation (bag) (3,000 mLs Irrigation $New Bag 7/18/25 0990)   senna-docusate (SENOKOT-S/PERICOLACE) 8.6-50 MG per tablet 1 tablet (has no administration in time range)     Or   senna-docusate (SENOKOT-S/PERICOLACE) 8.6-50 MG per tablet 2 tablet (has no administration in time range)   ondansetron (ZOFRAN ODT) ODT tab 4 mg (has no administration in time range)     Or   ondansetron (ZOFRAN) injection 4 mg (has no administration in time range)   prochlorperazine (COMPAZINE) injection 5 mg (has no administration in time range)     Or   prochlorperazine  (COMPAZINE) tablet 5 mg (has no administration in time range)   lidocaine 1 % 0.1-1 mL (has no administration in time range)   lidocaine (LMX4) cream (has no administration in time range)   sodium chloride (PF) 0.9% PF flush 3 mL (has no administration in time range)   sodium chloride (PF) 0.9% PF flush 3 mL (has no administration in time range)   acetaminophen (TYLENOL) tablet 650 mg (has no administration in time range)     Or   acetaminophen (TYLENOL) Suppository 650 mg (has no administration in time range)   polyethylene glycol (MIRALAX) Packet 17 g (has no administration in time range)   amLODIPine (NORVASC) tablet 2.5 mg (has no administration in time range)   atorvastatin (LIPITOR) tablet 80 mg (has no administration in time range)   carvedilol (COREG) tablet 12.5 mg (has no administration in time range)   lisinopril (ZESTRIL) tablet 40 mg (has no administration in time range)   oxyBUTYnin ER (DITROPAN XL) 24 hr tablet 5 mg (has no administration in time range)       Procedures   Procedures     Discussion of Management   Admitting Hospitalist, SHOAIB Holley  Urology, Dr. Velez    ED Course   ED Course as of 07/18/25 1532   Fri Jul 18, 2025   1026 I obtained history and examined the patient as noted above     1214 Nurse updated me post-baig placement and flush. No clots seen and adequate urinary flow.   1215 I rechecked the patient and explained findings.     1243 I spoke with Dr. Velez of Urology for patient plan of care. Plan to admit for continued care.   1250 I rechecked the patient and explained plan of care.   1315 I spoke with SHOAIB Holley, of the hospitalist team who accepted the patient for admission.       Additional Documentation  None    Medical Decision Making / Diagnosis     CMS Diagnoses: None    MIPS   Baig catheter was inserted because acute urinary retention/bladder outlet obstruction and need for accurate measurement of urinary output.      AMARA Jackson is a 69 year  old male who presents with hematuria.  He recently underwent a resection of bladder tumor and recently started back on his antiplatelet agents.  I suspect this is the likely cause of his bleeding.  He has been urinating clots but it is unclear if he is obstructed or not at this point.  We attempted to do a postvoid bladder scan but the patient could not urinate.  400 mL of urine were found on his bladder scan.  Therefore we placed a Jansen catheter and attempted to manually irrigate.  We did not notice any clots with the manual irrigation but he did have frankly bloody urine.  He also has slight anemia which appears new today which could be from blood loss.  I discussed with urology and they recommend placement of a three-way catheter and continuous bladder irrigation.  I discussed with the hospitalist team who accepts admission.    Disposition   The patient was admitted to the hospital.     Diagnosis     ICD-10-CM    1. Gross hematuria  R31.0       2. Anemia due to blood loss, acute  D62            Discharge Medications   Current Discharge Medication List            Scribe Disclosure:  I, Cody Cisse, am serving as a scribe at 10:36 AM on 7/18/2025 to document services personally performed by John Dias MD based on my observations and the provider's statements to me.        John Dias MD  07/18/25 1455

## 2025-07-18 NOTE — ED NOTES
Manually irrigated with 90 ccs. Bright red urine returned. No visible clots. Patent. 480 mL in total for output so far.

## 2025-07-18 NOTE — CONSULTS
[facility] Consultation by [facility] Urology    [unfilled] MRN# [id number]   Age: 69 year old YOB: 1955     Date of Admission:  7/18/2025    Reason for consult: Gross hematuria               Level of consult: Consult, follow and place orders           Assessment and Plan:   Assessment:   Gross hematuria, improving      Plan:   Admit to hospital overnight.  Continuous bladder irrigation overnight.  Continue to hold Plavix.  And irrigate catheter as needed.  Urology will continue to follow.    Kaden Alexander M.D.  [facility] Urology  [phone]                 Chief Complaint:        History is obtained from the patient and EMR.         History of Present Illness:   This patient is a 69 year old male admitted with gross hematuria.  He is well-known to me.  He underwent transurethral resection of bladder tumor with the operating on July 7.  He has had completely clear urine since that time until yesterday.  He restarted his Plavix 6 days ago and the urine remained clear.  He said he had the sudden onset of gross hematuria.yesterday and it lasted through the night.  He had a difficult time urinating so he came to the emergency department and had a Jansen catheter placed.  He now has continuous irrigation running through his catheter.    On exam the patient is alert and oriented and in no acute distress.  The Jansen catheter is draining red urine with CBI running.  I hand irrigated the catheter multiple times and got out some clots.  The irrigation cleared up quite a bit with hand irrigation.  I placed the catheter back to continuous irrigation.    Hemoglobin 12.8.  Serum WBC 13.1.  Creatinine 1.19.       Past Medical History:     Past Medical History:   Diagnosis Date    Benign essential hypertension 09/04/2024    Cancer (H) 6/4/2025    Coronary artery disease involving native coronary artery of native heart without angina pectoris 09/04/2024    Hyperlipidemia LDL goal <70 09/04/2024    Hypertension      Ischemic cardiomyopathy 2017             Past Surgical History:     Past Surgical History:   Procedure Laterality Date    ABDOMEN SURGERY  1964    BIOPSY  2025    CYSTOSCOPY, FULGURATE BLEEDERS, EVACUATE CLOT(S), COMBINED N/A 2024    Procedure: CYSTOSCOPY, WITH FULGURATION OF HEMORRHAGING BLOOD VESSEL AND THROMBUS REMOVAL;  Surgeon: Noah Adame MD;  Location: [facility] OR    CYSTOSCOPY, RETROGRADES, COMBINED Bilateral 2025    Procedure: Bilateral retrograde pyelograms, interpretation of fluoroscopic images;  Surgeon: Kaden Alexander MD;  Location: [facility] OR    CYSTOSCOPY, TRANSURETHRAL RESECTION (TUR) TUMOR BLADDER, COMBINED N/A 2025    Procedure: Cystoscopy, transurethral resection of the bladder tumor, large, total area greater than 5 cm;  Surgeon: Kaden Alexander MD;  Location: [facility] OR    CYSTOSCOPY, TRANSURETHRAL RESECTION (TUR) TUMOR BLADDER, COMBINED N/A 2025    Procedure: Cystoscopy, transurethral resection of the bladder tumor, large, greater than 5 cm;  Surgeon: Kaden Alexander MD;  Location: [facility] OR    EYE SURGERY      HERNIA REPAIR               Social History:     Social History     Tobacco Use    Smoking status: Former     Current packs/day: 0.00     Average packs/day: 1.5 packs/day for 25.0 years (37.5 ttl pk-yrs)     Types: Cigarettes     Quit date: 1997     Years since quittin.8    Smokeless tobacco: Never   Substance Use Topics    Alcohol use: Not Currently             Family History:     Family History   Problem Relation Age of Onset    Other Cancer Mother     Cerebrovascular Disease Father      Family history reviewed.             Allergies:   No Known Allergies          Medications:     Current Facility-Administered Medications   Medication Dose Route Frequency Provider Last Rate Last Admin    acetaminophen (TYLENOL) tablet 650 mg  650 mg Oral Q4H PRN Rosalia Ponce PA-C        Or    acetaminophen (TYLENOL) Suppository 650 mg  650 mg  Rectal Q4H PRN Rosalia Ponce PA-C        lidocaine (LMX4) cream   Topical Q1H PRN Rosalia Ponce PA-C        lidocaine (XYLOCAINE) 2 % external gel   Urethral Once PRN John Dias MD        lidocaine 1 % 0.1-1 mL  0.1-1 mL Other Q1H PRN Rosalia Ponce PA-C        ondansetron (ZOFRAN ODT) ODT tab 4 mg  4 mg Oral Q6H PRN Rosalia Ponce PA-C        Or    ondansetron (ZOFRAN) injection 4 mg  4 mg Intravenous Q6H PRN Rosalia Ponce PA-C        oxyBUTYnin ER (DITROPAN XL) 24 hr tablet 5 mg  5 mg Oral Once John Dias MD        polyethylene glycol (MIRALAX) Packet 17 g  17 g Oral BID PRN Rosalia Ponce PA-C        prochlorperazine (COMPAZINE) injection 5 mg  5 mg Intravenous Q6H PRN Rosalia Ponce PA-C        Or    prochlorperazine (COMPAZINE) tablet 5 mg  5 mg Oral Q6H PRN Rosalia Ponce PA-C        senna-docusate (SENOKOT-S/PERICOLACE) 8.6-50 MG per tablet 1 tablet  1 tablet Oral BID PRN Rosalia Ponce PA-C        Or    senna-docusate (SENOKOT-S/PERICOLACE) 8.6-50 MG per tablet 2 tablet  2 tablet Oral BID PRN Rosalia Ponce PA-C        sodium chloride (PF) 0.9% PF flush 3 mL  3 mL Intracatheter q1 min prn Rosalia Ponce PA-C        sodium chloride (PF) 0.9% PF flush 3 mL  3 mL Intracatheter Q8H Rosalia Ponce PA-C        sodium chloride 0.9% irrigation (bag)   Irrigation Continuous John Dias MD   New Bag at 07/18/25 1402     Current Outpatient Medications   Medication Sig Dispense Refill    amLODIPine (NORVASC) 2.5 MG tablet Take 1 tablet (2.5 mg) by mouth at bedtime. 90 tablet 3    aspirin (ASA) 81 MG chewable tablet Take 81 mg by mouth daily.      atorvastatin (LIPITOR) 80 MG tablet Take 1 tablet (80 mg) by mouth daily. 90 tablet 3    carvedilol (COREG) 12.5 MG tablet Take 1 tablet (12.5 mg) by mouth 2 times daily (with meals). 180 tablet 3    clopidogrel (PLAVIX) 75 MG tablet Take 1 tablet (75 mg) by mouth daily. 90 tablet 3    levofloxacin (LEVAQUIN) 500 MG tablet Take 1 tablet 6  "hours post treatment and 1 tablet each AM following each treatment. 12 tablet 0    Multiple Vitamin (MULTIVITAMIN ADULT) TABS Take 1 tablet by mouth daily.      nitroGLYcerin (NITROSTAT) 0.4 MG sublingual tablet For chest pain place 1 tablet under the tongue every 5 minutes for 3 doses. If symptoms persist 5 minutes after 1st dose call 911. 25 tablet 3    oxyBUTYnin ER (DITROPAN XL) 5 MG 24 hr tablet Take 1 tablet (5 mg) by mouth daily. 30 tablet 0             Review of Systems:   A comprehensive 10-point review of systems was performed and found to be negative except as described in the HPI.     BP (!) 163/116   Pulse 85   Temp 97.9  F (36.6  C) (Temporal)   Resp 18   Ht 1.753 m (5' 9\")   Wt 95.5 kg (210 lb 8.6 oz)   SpO2 98%   BMI 31.09 kg/m    PSYCH: NAD  EYES: EOMI  MOUTH: MMM  NEURO: AAO x3            Data:     Lab Results   Component Value Date    WBC 13.1 (H) 07/18/2025    HGB 12.8 (L) 07/18/2025    HCT 37.5 (L) 07/18/2025    MCV 88 07/18/2025     07/18/2025     Lab Results   Component Value Date    CR 1.19 (H) 07/18/2025                  "

## 2025-07-18 NOTE — ED NOTES
"[facility]  ED Nurse Handoff Report    ED Chief complaint: Hematuria and Urinary Retention  . ED Diagnosis:   Final diagnoses:   Gross hematuria   Anemia due to blood loss, acute       Allergies: No Known Allergies    Code Status: Full Code    Activity level - Baseline/Home:  independent.  Activity Level - Current:   standby.   Lift room needed: No.   Bariatric: No   Needed: No   Isolation: No.   Infection: Not Applicable.     Respiratory status: Room air    Vital Signs (within 30 minutes):   Vitals:    07/18/25 1006   BP: (!) 216/110   Pulse: 84   Resp: 18   Temp: 97.9  F (36.6  C)   TempSrc: Temporal   SpO2: 100%   Weight: 95.5 kg (210 lb 8.6 oz)   Height: 1.753 m (5' 9\")       Cardiac Rhythm:  ,      Pain level:    Patient confused: No.   Patient Falls Risk: patient and family education.   Elimination Status: Urethral catheter (baig) in place; refer to orders to discontinue as per protocol      Patient Report - Initial Complaint: Hematuria.   Focused Assessment: Pt is a 69 year old male on Plavix and ASA 81 MG with a history of hypertension, hyperlipidemia, bladder cancer, gross hematuria, ischemic cardiomyopathy, CAD, and STEMI who presents to the ED for evaluation of hematuria and urinary retention. The patient reports having three tumors removed from his bladder on 6/4/25 followed by a cystoscopy on 7/7/25 where more material was removed to ensure his bladder cancer was gone. Upon receiving promising biopsy results, he was told he could begin taking Plavix again on 7/12/25. He states that last night he had an onset of hematuria with urinary retention around 2000, noting that he passed clots through the night. He mentions passing clots at 0300 this morning with many clots passed when urinating at 0600, further prompting today's visit. He endorses dysuria and continued passing of clots while in the ED today. He denies any fever, chills, abdominal pain, or back pain. "     Abnormal Results:   Labs Ordered and Resulted from Time of ED Arrival to Time of ED Departure   ROUTINE UA WITH MICROSCOPIC REFLEX TO CULTURE - Abnormal       Result Value    Color Urine Red (*)     Appearance Urine Cloudy (*)     Glucose Urine        Bilirubin Urine        Ketones Urine        Specific Gravity Urine        Blood Urine Large (*)     pH Urine        Protein Albumin Urine        Urobilinogen Urine        Nitrite Urine        Leukocyte Esterase Urine        RBC Urine >182 (*)     WBC Urine 23 (*)    BASIC METABOLIC PANEL - Abnormal    Sodium 132 (*)     Potassium 4.1      Chloride 99      Carbon Dioxide (CO2) 22      Anion Gap 11      Urea Nitrogen 23.0      Creatinine 1.19 (*)     GFR Estimate 66      Calcium 9.1      Glucose 106 (*)    CBC WITH PLATELETS AND DIFFERENTIAL - Abnormal    WBC Count 13.1 (*)     RBC Count 4.25 (*)     Hemoglobin 12.8 (*)     Hematocrit 37.5 (*)     MCV 88      MCH 30.1      MCHC 34.1      RDW 12.4      Platelet Count 326      % Neutrophils 74      % Lymphocytes 15      % Monocytes 8      % Eosinophils 2      % Basophils 1      % Immature Granulocytes 1      NRBCs per 100 WBC 0      Absolute Neutrophils 9.7 (*)     Absolute Lymphocytes 1.9      Absolute Monocytes 1.1      Absolute Eosinophils 0.3      Absolute Basophils 0.1      Absolute Immature Granulocytes 0.1      Absolute NRBCs 0.0     URINE CULTURE        No orders to display       Treatments provided: see MAR  Family Comments: N/A  OBS brochure/video discussed/provided to patient:    ED Medications:   Medications   lidocaine (XYLOCAINE) 2 % external gel (has no administration in time range)   sodium chloride 0.9% irrigation (bag) ( Irrigation $New Bag 7/18/25 6466)   oxyBUTYnin ER (DITROPAN XL) 24 hr tablet 5 mg (has no administration in time range)   morphine (PF) injection 4 mg (has no administration in time range)   senna-docusate (SENOKOT-S/PERICOLACE) 8.6-50 MG per tablet 1 tablet (has no administration in  time range)     Or   senna-docusate (SENOKOT-S/PERICOLACE) 8.6-50 MG per tablet 2 tablet (has no administration in time range)   ondansetron (ZOFRAN ODT) ODT tab 4 mg (has no administration in time range)     Or   ondansetron (ZOFRAN) injection 4 mg (has no administration in time range)   prochlorperazine (COMPAZINE) injection 5 mg (has no administration in time range)     Or   prochlorperazine (COMPAZINE) tablet 5 mg (has no administration in time range)   lidocaine 1 % 0.1-1 mL (has no administration in time range)   lidocaine (LMX4) cream (has no administration in time range)   sodium chloride (PF) 0.9% PF flush 3 mL (has no administration in time range)   sodium chloride (PF) 0.9% PF flush 3 mL (has no administration in time range)   acetaminophen (TYLENOL) tablet 650 mg (has no administration in time range)     Or   acetaminophen (TYLENOL) Suppository 650 mg (has no administration in time range)   polyethylene glycol (MIRALAX) Packet 17 g (has no administration in time range)       Drips infusing:  Yes  For the majority of the shift this patient was Green.   Interventions performed were N/A.    Sepsis treatment initiated: No    Cares/treatment/interventions/medications to be completed following ED care: N/A    ED Nurse Name: Loyd Juan RN  2:21 PM     RECEIVING UNIT ED HANDOFF REVIEW    Above ED Nurse Handoff Report was reviewed: Yes  Reviewed by: Ny Gross RN on July 18, 2025 at 3:14 PM

## 2025-07-18 NOTE — H&P
[facility]    History and Physical - Hospitalist Service       Date of Admission:  7/18/2025    Assessment & Plan      Issac Jackson is a 69 year old male with PMH bladder tumor, recovered ischemic cardiomyopathy, inferior STEMI status post PCI, hypertension, hyperlipidemia, CKD who presents with gross hematuria and clots.    Status post transurethral resection of bladder tumor, cystoscopy on 7/7/2025 with Dr. Alexander. Had been recovering well. Restarted his Plavix and aspirin on 7/12 after clearance from urology.  On 7/17 evening he noticed blood clots in his urine which has continued along with gross hematuria. Denies any abdominal pain, nausea, vomiting, fevers or chills.    In the ED, afebrile, hypertensive 216/110, heart rate 84, respirations 18, oxygen 100% on room air.  BMP notable for a sodium of 132, creatinine 1.19.  CBC notable for leukocytosis of 13.1, hemoglobin 12.8.  UA shows large blood, 23 WBC, greater than 182 RBC.    Gross hematuria   Bladder tumor s/p resection on 7/7/25  Leukocytosis  - urology consult  - continue CBI  - UA limited results due to hematuria, WBC 23, urology not recommending abx   - check procal with leukocytosis   - unclear if he is suppose to be taking pta Levaquin, will check with patient   - urine culture pending   - hemoglobin q 8 hrs x 3, transfuse if < 8 given CAD hx     Mild hyponatremia - 132  Encourage PO intake, repeat BMP in AM    Mild normocytic anemia  Suspect due to gross hematuria    - monitor as above    Recovered ischemic cardiomyopathy  CAD s/p PCI  HTN  HLD  AAA 3 cm   - hold pta plavix and aspirin with bleeding   - continue pta coreg, amlodipine, lisinopril, Lipitor     CKD  Baseline creatinine around 1.35, stable at 1.19 upon admission          Diet: NPO for Procedure/Surgery per Anesthesia Guidelines Except for: Meds, Ice Chips; Clear liquids before procedure/surgery: ADULT (Age GREATER than or Equal to 18 years) - Clear liquids 2  "hours before procedure/surgery    DVT Prophylaxis: Pneumatic Compression Devices  Jansen Catheter: PRESENT, indication: Gross hematuria  Lines: None     Cardiac Monitoring: None  Code Status:  Full code     Clinically Significant Risk Factors Present on Admission         # Hyponatremia: Lowest Na = 132 mmol/L in last 2 days, will monitor as appropriate         # Drug Induced Platelet Defect: home medication list includes an antiplatelet medication   # Hypertension: Noted on problem list           # Obesity: Estimated body mass index is 31.09 kg/m  as calculated from the following:    Height as of this encounter: 1.753 m (5' 9\").    Weight as of this encounter: 95.5 kg (210 lb 8.6 oz).              Disposition Plan     Medically Ready for Discharge: Anticipated Tomorrow       The patient's care was discussed with the Bedside Nurse, Patient, and ED provider.    SAKSHI Ponce PA-C  Hospitalist Service  [facility]  Securely message with [employer] (more info)  Text page via [employer] Paging/Directory     ______________________________________________________________________    Chief Complaint   Gross hematuria     History is obtained from the patient    History of Present Illness   Issac KATE Jackson is a 69 year old male with PMH bladder tumor, recovered ischemic cardiomyopathy, inferior STEMI status post PCI, hypertension, hyperlipidemia, CKD who presents with gross hematuria and clots.    Status post transurethral resection of bladder tumor, cystoscopy on 7/7/2025 with Dr. Alexander. Had been recovering well. Restarted his Plavix and aspirin on 7/12 after clearance from urology.  On 7/17 evening he noticed blood clots in his urine which has continued along with gross hematuria. Denies any abdominal pain, nausea, vomiting, fevers or chills.    In the ED, afebrile, hypertensive 216/110, heart rate 84, respirations 18, oxygen 100% on room air.  BMP notable for a sodium of 132, creatinine 1.19.  CBC notable for " leukocytosis of 13.1, hemoglobin 12.8.  UA shows large blood, 23 WBC, greater than 182 RBC.    Past Medical History    Past Medical History:   Diagnosis Date    Benign essential hypertension 09/04/2024    Cancer (H) 6/4/2025    Coronary artery disease involving native coronary artery of native heart without angina pectoris 09/04/2024    Hyperlipidemia LDL goal <70 09/04/2024    Hypertension     Ischemic cardiomyopathy 01/18/2017       Past Surgical History   Past Surgical History:   Procedure Laterality Date    ABDOMEN SURGERY  1964    BIOPSY  6/4/2025    CYSTOSCOPY, FULGURATE BLEEDERS, EVACUATE CLOT(S), COMBINED N/A 07/21/2024    Procedure: CYSTOSCOPY, WITH FULGURATION OF HEMORRHAGING BLOOD VESSEL AND THROMBUS REMOVAL;  Surgeon: Noah Adame MD;  Location: [facility] OR    CYSTOSCOPY, RETROGRADES, COMBINED Bilateral 7/7/2025    Procedure: Bilateral retrograde pyelograms, interpretation of fluoroscopic images;  Surgeon: Kaden Alexander MD;  Location: [facility] OR    CYSTOSCOPY, TRANSURETHRAL RESECTION (TUR) TUMOR BLADDER, COMBINED N/A 06/04/2025    Procedure: Cystoscopy, transurethral resection of the bladder tumor, large, total area greater than 5 cm;  Surgeon: Kaden Alexander MD;  Location: [facility] OR    CYSTOSCOPY, TRANSURETHRAL RESECTION (TUR) TUMOR BLADDER, COMBINED N/A 7/7/2025    Procedure: Cystoscopy, transurethral resection of the bladder tumor, large, greater than 5 cm;  Surgeon: Kaden Alexander MD;  Location: [facility] OR    EYE SURGERY  2024    HERNIA REPAIR       Prior to Admission Medications   Prior to Admission Medications   Prescriptions Last Dose Informant Patient Reported? Taking?   Multiple Vitamin (MULTIVITAMIN ADULT) TABS   Yes No   Sig: Take 1 tablet by mouth daily.   amLODIPine (NORVASC) 2.5 MG tablet   No No   Sig: Take 1 tablet (2.5 mg) by mouth at bedtime.   aspirin 81 MG EC tablet   Yes No   Sig: Take 1 tablet (81 mg) by mouth daily.   atorvastatin (LIPITOR) 80 MG tablet   No No   Sig:  Take 1 tablet (80 mg) by mouth daily.   carvedilol (COREG) 12.5 MG tablet   No No   Sig: Take 1 tablet (12.5 mg) by mouth 2 times daily (with meals).   clopidogrel (PLAVIX) 75 MG tablet   No No   Sig: Take 1 tablet (75 mg) by mouth daily.   levofloxacin (LEVAQUIN) 500 MG tablet   No No   Sig: Take 1 tablet 6 hours post treatment and 1 tablet each AM following each treatment.   lisinopril (ZESTRIL) 40 MG tablet   No No   Sig: Take 1 tablet (40 mg) by mouth daily.   Patient taking differently: Take 40 mg by mouth daily. Will hold DOS   nitroGLYcerin (NITROSTAT) 0.4 MG sublingual tablet   No No   Sig: For chest pain place 1 tablet under the tongue every 5 minutes for 3 doses. If symptoms persist 5 minutes after 1st dose call 911.   oxyBUTYnin ER (DITROPAN XL) 5 MG 24 hr tablet   No No   Sig: Take 1 tablet (5 mg) by mouth daily.      Facility-Administered Medications: None      Social History   I have reviewed this patient's social history and updated it with pertinent information if needed.  Social History     Tobacco Use    Smoking status: Former     Current packs/day: 0.00     Average packs/day: 1.5 packs/day for 25.0 years (37.5 ttl pk-yrs)     Types: Cigarettes     Quit date: 1997     Years since quittin.8    Smokeless tobacco: Never   Substance Use Topics    Alcohol use: Not Currently    Drug use: Never     Allergies   No Known Allergies     Physical Exam   Vital Signs: Temp: 97.9  F (36.6  C) Temp src: Temporal BP: (!) 216/110 Pulse: 84   Resp: 18 SpO2: 100 % O2 Device: None (Room air)    Weight: 210 lbs 8.63 oz    GENERAL:  Alert, Comfortable, No acute distress. Laying in bed.   PSYCH: pleasant, oriented.  HEART:  Normal S1, S2 with no murmur, RRR  LUNGS:  Normal Respiratory effort. Clear to auscultation bilaterally with no wheezing, rales or ronchi.  ABDOMEN:  Soft, non-tender, non distended. No peritoneal signs.   : baig catheter with dark red output and clots  EXTREMITIES:  No pitting pedal edema,  No cyanosis.   SKIN:  Warm, dry to touch. No rash.  NEUROLOGIC: Speech clear, alert & orientated x 4, no focal deficits.     Medical Decision Making       MANAGEMENT DISCUSSED with the following over the past 24 hours: patient, ED provider, nursing   NOTE(S)/MEDICAL RECORDS REVIEWED over the past 24 hours: labs, imaging, progress notes      Data     I have personally reviewed the following data over the past 24 hrs:    13.1 (H)  \   12.8 (L)   / 326     132 (L) 99 23.0 /  106 (H)   4.1 22 1.19 (H) \       Imaging results reviewed over the past 24 hrs:   No results found for this or any previous visit (from the past 24 hours).

## 2025-07-18 NOTE — ED TRIAGE NOTES
Hx of bladder cancer. Pt had cystoscopy beginning of month. Starting last night at 8 pm, pt noticed blood and blood clots in urine. States this morning he has had difficulty emptying bladder. States one hour ago is when he last voided. ABCs intact. A&OX4.      Triage Assessment (Adult)       Row Name 07/18/25 1004          Triage Assessment    Airway WDL WDL        Respiratory WDL    Respiratory WDL WDL        Skin Circulation/Temperature WDL    Skin Circulation/Temperature WDL WDL        Cardiac WDL    Cardiac WDL WDL        Peripheral/Neurovascular WDL    Peripheral Neurovascular WDL WDL        Cognitive/Neuro/Behavioral WDL    Cognitive/Neuro/Behavioral WDL WDL

## 2025-07-18 NOTE — PLAN OF CARE
ROOM # 230    Living Situation (if not independent, order SW consult):  Facility name:  : Jennifer (wife) [phone]    Activity level at baseline: Independent  Activity level on admit: SBA d/t  CBI    Who will be transporting you at discharge: Jennifer     Patient registered to observation; given Patient Bill of Rights; given the opportunity to ask questions about observation status and their plan of care.  Patient has been oriented to the observation room, bathroom and call light is in place.    Discussed discharge goals and expectations with patient/family.

## 2025-07-19 VITALS
BODY MASS INDEX: 29.86 KG/M2 | WEIGHT: 201.6 LBS | HEART RATE: 82 BPM | OXYGEN SATURATION: 94 % | SYSTOLIC BLOOD PRESSURE: 132 MMHG | HEIGHT: 69 IN | TEMPERATURE: 97.8 F | RESPIRATION RATE: 18 BRPM | DIASTOLIC BLOOD PRESSURE: 76 MMHG

## 2025-07-19 LAB
ANION GAP SERPL CALCULATED.3IONS-SCNC: 12 MMOL/L (ref 7–15)
BACTERIA UR CULT: NORMAL
BASOPHILS # BLD AUTO: 0 10E3/UL (ref 0–0.2)
BASOPHILS NFR BLD AUTO: 0 %
BUN SERPL-MCNC: 20 MG/DL (ref 8–23)
CALCIUM SERPL-MCNC: 9.1 MG/DL (ref 8.8–10.4)
CHLORIDE SERPL-SCNC: 101 MMOL/L (ref 98–107)
CREAT SERPL-MCNC: 1.38 MG/DL (ref 0.67–1.17)
EGFRCR SERPLBLD CKD-EPI 2021: 55 ML/MIN/1.73M2
EOSINOPHIL # BLD AUTO: 0 10E3/UL (ref 0–0.7)
EOSINOPHIL NFR BLD AUTO: 0 %
ERYTHROCYTE [DISTWIDTH] IN BLOOD BY AUTOMATED COUNT: 12.7 % (ref 10–15)
GLUCOSE SERPL-MCNC: 134 MG/DL (ref 70–99)
HBV SURFACE AG SERPL QL IA: NONREACTIVE
HCO3 SERPL-SCNC: 22 MMOL/L (ref 22–29)
HCT VFR BLD AUTO: 38.4 % (ref 40–53)
HCV RNA SERPL NAA+PROBE-ACNC: NOT DETECTED IU/ML
HGB BLD-MCNC: 12.7 G/DL (ref 13.3–17.7)
HGB BLD-MCNC: 12.7 G/DL (ref 13.3–17.7)
HIV 1+2 AB+HIV1 P24 AG SERPL QL IA: NONREACTIVE
IMM GRANULOCYTES # BLD: 0.1 10E3/UL
IMM GRANULOCYTES NFR BLD: 0 %
LYMPHOCYTES # BLD AUTO: 1.1 10E3/UL (ref 0.8–5.3)
LYMPHOCYTES NFR BLD AUTO: 8 %
MCH RBC QN AUTO: 29.8 PG (ref 26.5–33)
MCHC RBC AUTO-ENTMCNC: 33.1 G/DL (ref 31.5–36.5)
MCV RBC AUTO: 90 FL (ref 78–100)
MCV RBC AUTO: 90 FL (ref 78–100)
MONOCYTES # BLD AUTO: 0.5 10E3/UL (ref 0–1.3)
MONOCYTES NFR BLD AUTO: 4 %
NEUTROPHILS # BLD AUTO: 12.1 10E3/UL (ref 1.6–8.3)
NEUTROPHILS NFR BLD AUTO: 87 %
NRBC # BLD AUTO: 0 10E3/UL
NRBC BLD AUTO-RTO: 0 /100
PLATELET # BLD AUTO: 342 10E3/UL (ref 150–450)
POTASSIUM SERPL-SCNC: 5 MMOL/L (ref 3.4–5.3)
RBC # BLD AUTO: 4.26 10E6/UL (ref 4.4–5.9)
SODIUM SERPL-SCNC: 135 MMOL/L (ref 135–145)
WBC # BLD AUTO: 13.8 10E3/UL (ref 4–11)

## 2025-07-19 PROCEDURE — G0378 HOSPITAL OBSERVATION PER HR: HCPCS

## 2025-07-19 PROCEDURE — 96374 THER/PROPH/DIAG INJ IV PUSH: CPT

## 2025-07-19 PROCEDURE — 250N000013 HC RX MED GY IP 250 OP 250 PS 637: Performed by: NURSE PRACTITIONER

## 2025-07-19 PROCEDURE — 99239 HOSP IP/OBS DSCHRG MGMT >30: CPT | Performed by: NURSE PRACTITIONER

## 2025-07-19 PROCEDURE — 80048 BASIC METABOLIC PNL TOTAL CA: CPT

## 2025-07-19 PROCEDURE — 258N000001 HC RX 258: Performed by: EMERGENCY MEDICINE

## 2025-07-19 PROCEDURE — 250N000013 HC RX MED GY IP 250 OP 250 PS 637

## 2025-07-19 PROCEDURE — 85025 COMPLETE CBC W/AUTO DIFF WBC: CPT

## 2025-07-19 PROCEDURE — 250N000011 HC RX IP 250 OP 636: Performed by: NURSE PRACTITIONER

## 2025-07-19 PROCEDURE — 99212 OFFICE O/P EST SF 10 MIN: CPT | Performed by: UROLOGY

## 2025-07-19 PROCEDURE — 36415 COLL VENOUS BLD VENIPUNCTURE: CPT

## 2025-07-19 RX ORDER — ACETAMINOPHEN 325 MG/1
650 TABLET ORAL EVERY 4 HOURS PRN
COMMUNITY
Start: 2025-07-19

## 2025-07-19 RX ORDER — ATROPA BELLADONNA AND OPIUM 16.2; 3 MG/1; MG/1
15 SUPPOSITORY RECTAL EVERY 6 HOURS PRN
Qty: 4 SUPPOSITORY | Refills: 0 | Status: SHIPPED | OUTPATIENT
Start: 2025-07-19

## 2025-07-19 RX ORDER — HYDROMORPHONE HYDROCHLORIDE 2 MG/1
2 TABLET ORAL
Refills: 0 | Status: DISCONTINUED | OUTPATIENT
Start: 2025-07-19 | End: 2025-07-19 | Stop reason: HOSPADM

## 2025-07-19 RX ORDER — ATROPA BELLADONNA AND OPIUM 16.2; 3 MG/1; MG/1
15 SUPPOSITORY RECTAL EVERY 6 HOURS PRN
Qty: 4 SUPPOSITORY | Refills: 0 | Status: SHIPPED | OUTPATIENT
Start: 2025-07-19 | End: 2025-07-19

## 2025-07-19 RX ORDER — HYDROMORPHONE HYDROCHLORIDE 2 MG/1
1 TABLET ORAL
Qty: 10 TABLET | Refills: 0 | Status: SHIPPED | OUTPATIENT
Start: 2025-07-19

## 2025-07-19 RX ADMIN — HYDROMORPHONE HYDROCHLORIDE 2 MG: 2 TABLET ORAL at 10:14

## 2025-07-19 RX ADMIN — ACETAMINOPHEN 650 MG: 325 TABLET ORAL at 03:59

## 2025-07-19 RX ADMIN — CARVEDILOL 12.5 MG: 12.5 TABLET, FILM COATED ORAL at 07:51

## 2025-07-19 RX ADMIN — ACETAMINOPHEN 650 MG: 325 TABLET ORAL at 09:23

## 2025-07-19 RX ADMIN — LISINOPRIL 40 MG: 40 TABLET ORAL at 07:51

## 2025-07-19 RX ADMIN — SODIUM CHLORIDE 3000 ML: 900 IRRIGANT IRRIGATION at 06:09

## 2025-07-19 RX ADMIN — HYDROMORPHONE HYDROCHLORIDE 0.2 MG: 0.2 INJECTION, SOLUTION INTRAMUSCULAR; INTRAVENOUS; SUBCUTANEOUS at 03:59

## 2025-07-19 RX ADMIN — SODIUM CHLORIDE 6000 ML: 900 IRRIGANT IRRIGATION at 10:21

## 2025-07-19 RX ADMIN — SODIUM CHLORIDE 3000 ML: 900 IRRIGANT IRRIGATION at 03:55

## 2025-07-19 RX ADMIN — ATORVASTATIN CALCIUM 80 MG: 40 TABLET, FILM COATED ORAL at 07:51

## 2025-07-19 RX ADMIN — OXYBUTYNIN CHLORIDE 5 MG: 5 TABLET, EXTENDED RELEASE ORAL at 07:51

## 2025-07-19 ASSESSMENT — ACTIVITIES OF DAILY LIVING (ADL)
ADLS_ACUITY_SCORE: 37
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
## 2025-07-19 NOTE — PLAN OF CARE
Goal Outcome Evaluation:  PRIMARY DIAGNOSIS: Cystoscopy, evacuation of bladder hematoma   OUTPATIENT/OBSERVATION GOALS TO BE MET BEFORE DISCHARGE:  1. Stable vital signs Yes  2. Tolerating diet:Yes  3. Pain controlled with oral pain medications:  No  4. Positive bowel sounds:  Yes  5. Voiding without difficulty:  No. Jansen in place   6. Able to ambulate:  No  7. Provider specific discharge goals met:  No    Discharge Planner Nurse   Safe discharge environment identified: Yes  Barriers to discharge: Yes       Entered by: ULICES MCMANUS RN 07/19/2025 4:46 AM   Vital signs:  Temp: 97.8  F (36.6  C) Temp src: Oral BP: (!) 144/83 Pulse: 57   Resp: 18 SpO2: 96 % O2 Device: None (Room air)      Please review provider order for any additional goals.   Nurse to notify provider when observation goals have been met and patient is ready for discharge.    Plan of Care Reviewed With: patient    Overall Patient Progress: improvingOverall Patient Progress: improving    Outcome Evaluation: A&Ox4. POD #1 Cysto with clots evacuation. Three way Jansen catheter in place with slow rate CBI going. Output light pink/watermelon. PRN Tyelnol and 0.2mg IV Dilaudid given for pain/spasm. Pt did not want to try B&O suppository at this time. On regular diet. PIV saline locked. Urology following. Hgb 12.7. SBA to x1 assist post procedure. Bed alarm on. Call light in reach.    Problem: Adult Inpatient Plan of Care  Goal: Plan of Care Review  Description: The Plan of Care Review/Shift note should be completed every shift.  The Outcome Evaluation is a brief statement about your assessment that the patient is improving, declining, or no change.  This information will be displayed automatically on your shift  note.  Outcome: Progressing  Flowsheets (Taken 7/19/2025 0442)  Outcome Evaluation: A&Ox4. POD #1 Cysto with clots evacuation. Three way Jansen catheter in place with slow rate CBI going. Output light pink/watermelon. PRN Tyelnol and 0.2mg IV  "Dilaudid given for pain/spasm. Pt did not want to try B&O suppository at this time. On regular diet. PIV saline locked. Urology following. Hgb 12.7. SBA to x1 assist post procedure. Bed alarm on. Call light in reach.  Plan of Care Reviewed With: patient  Overall Patient Progress: improving  Goal: Patient-Specific Goal (Individualized)  Description: You can add care plan individualizations to a care plan. Examples of Individualization might be:  \"Parent requests to be called daily at 9am for status\", \"I have a hard time hearing out of my right ear\", or \"Do not touch me to wake me up as it startles  me\".  Outcome: Progressing  Goal: Absence of Hospital-Acquired Illness or Injury  Outcome: Progressing  Goal: Optimal Comfort and Wellbeing  Outcome: Progressing  Goal: Readiness for Transition of Care  Outcome: Progressing     Problem: Delirium  Goal: Optimal Coping  Outcome: Progressing  Goal: Improved Behavioral Control  Outcome: Progressing  Goal: Improved Attention and Thought Clarity  Outcome: Progressing  Goal: Improved Sleep  Outcome: Progressing           "

## 2025-07-19 NOTE — PLAN OF CARE
"PRIMARY DIAGNOSIS: Gross Hematuria/ S/P Cysto  OUTPATIENT/OBSERVATION GOALS TO BE MET BEFORE DISCHARGE:  ADLs back to baseline: Yes    Activity and level of assistance: Up with standby assistance.    Pain status: Improved-controlled with oral pain medications.    Return to near baseline physical activity: Yes     Discharge Planner Nurse   Safe discharge environment identified: Yes  Barriers to discharge: Yes       Entered by: Bharti Holly RN 07/18/2025 11:40 PM     Please review provider order for any additional goals.   Nurse to notify provider when observation goals have been met and patient is ready for discharge.      Goal Outcome Evaluation:      Plan of Care Reviewed With: patient    Overall Patient Progress: improvingOverall Patient Progress: improving    Outcome Evaluation: Pt alert and oriented. Transfered back up to room 230 from PACU. Pt complaining of minimal pain. Tylenol given per request. VSS except hypertensive. CBI running. Urine clear to pink.          Problem: Adult Inpatient Plan of Care  Goal: Plan of Care Review  Description: The Plan of Care Review/Shift note should be completed every shift.  The Outcome Evaluation is a brief statement about your assessment that the patient is improving, declining, or no change.  This information will be displayed automatically on your shift  note.  Outcome: Progressing  Flowsheets (Taken 7/18/2025 2332)  Outcome Evaluation: Pt alert and oriented. Transfered back up to room 230 from PACU. Pt complaining of minimal pain. Tylenol given per request. VSS except hypertensive. CBI running. Urine clear to pink.  Plan of Care Reviewed With: patient  Overall Patient Progress: improving  Goal: Patient-Specific Goal (Individualized)  Description: You can add care plan individualizations to a care plan. Examples of Individualization might be:  \"Parent requests to be called daily at 9am for status\", \"I have a hard time hearing out of my right ear\", or \"Do not touch me to " "wake me up as it startles  me\".  Outcome: Progressing  Goal: Absence of Hospital-Acquired Illness or Injury  Outcome: Progressing  Intervention: Identify and Manage Fall Risk  Recent Flowsheet Documentation  Taken 7/18/2025 2155 by Tamir Holly RN  Safety Promotion/Fall Prevention:   activity supervised   assistive device/personal items within reach   increase visualization of patient   lighting adjusted   nonskid shoes/slippers when out of bed   safety round/check completed  Intervention: Prevent Skin Injury  Recent Flowsheet Documentation  Taken 7/18/2025 2155 by Tamir Holly RN  Body Position: supine, head elevated  Intervention: Prevent and Manage VTE (Venous Thromboembolism) Risk  Recent Flowsheet Documentation  Taken 7/18/2025 2155 by Tamir Holly RN  VTE Prevention/Management: SCDs on (sequential compression devices)  Intervention: Prevent Infection  Recent Flowsheet Documentation  Taken 7/18/2025 2155 by Tamir Holly RN  Infection Prevention:   rest/sleep promoted   personal protective equipment utilized   hand hygiene promoted   single patient room provided  Goal: Optimal Comfort and Wellbeing  Outcome: Progressing  Goal: Readiness for Transition of Care  Outcome: Progressing     Problem: Delirium  Goal: Optimal Coping  Outcome: Progressing  Goal: Improved Behavioral Control  Outcome: Progressing  Intervention: Minimize Safety Risk  Recent Flowsheet Documentation  Taken 7/18/2025 2155 by Tamir Holly RN  Enhanced Safety Measures: pain management  Goal: Improved Attention and Thought Clarity  Outcome: Progressing  Goal: Improved Sleep  Outcome: Progressing     "

## 2025-07-19 NOTE — DISCHARGE SUMMARY
"[facility]  Hospitalist Discharge Summary      Date of Admission:  7/18/2025  Date of Discharge:  7/19/2025  Discharging Provider: RY Figueroa CNP  Discharge Service: Hospitalist Service    Discharge Diagnoses   See below    Clinically Significant Risk Factors     # Overweight: Estimated body mass index is 29.77 kg/m  as calculated from the following:    Height as of this encounter: 1.753 m (5' 9\").    Weight as of this encounter: 91.4 kg (201 lb 9.6 oz).       Follow-ups Needed After Discharge   Follow-up Appointments       Follow Up      Call your Cardiologist on Monday, 7/21/25 to ask about resuming aspirin and clopidogrel.  These are being held given your hematuria.      Follow up with Dr. Stevenson as directed.        Hospital Follow-up with Existing Primary Care Provider (PCP)          Schedule Primary Care visit within: 7 Days               Unresulted Labs Ordered in the Past 30 Days of this Admission       No orders found for last 31 day(s).        These results will be followed up by NA    Discharge Disposition   Discharged to home  Condition at discharge: Stable    Hospital Course     Issac Jackson is a 69 year old male with PMH bladder tumor, recovered ischemic cardiomyopathy, inferior STEMI status post PCI, hypertension, hyperlipidemia, CKD who presents with gross hematuria and clots.     Status post transurethral resection of bladder tumor, cystoscopy on 7/7/2025 with Dr. Alexander. Had been recovering well. Restarted his Plavix and aspirin on 7/12 after clearance from urology.  On 7/17 evening he noticed blood clots in his urine which has continued along with gross hematuria. Denies any abdominal pain, nausea, vomiting, fevers or chills.     In the ED, afebrile, hypertensive 216/110, heart rate 84, respirations 18, oxygen 100% on room air.  BMP notable for a sodium of 132, creatinine 1.19.  CBC notable for leukocytosis of 13.1, hemoglobin 12.8.  UA shows large blood, 23 " WBC, greater than 182 RBC.     Gross hematuria 2/2 bladder hematoma  Bladder tumor s/p resection on 7/7/25  S/P cystoscopy, evacuation of bladder hematoma and fulguration of the bladder.   Leukocytosis  Seen and examined.  Admitted to Observation.  Seen by Urology and taken to the OR for the above due to continued hematuria.  After admission he clotted off several of his catheters and was having trouble irrigating the catheter.  He underwent uneventful cystoscopy and evacuation of hematoma.   His labs remained stable. He remained hemodynamically stable as well.  He stayed overnight and on the day of discharge he had returned to his baseline.  No acute events postoperatively.  His urine has been clear since surgery and he feels well and wants to discharge home.  Jansen catheter was removed. He will stay off of the Plavix for now and will contact his Cardiologist on Monday, 7/21/25 for further direction.  He will follow up with Dr. Alexander as directed.       Mild hyponatremia - 132  Encourage PO intake, repeat BMP 7/19/2025 demonstrated normalization of sodium.      Mild normocytic anemia  Suspect due to gross hematuria.  Hgb remains stable at 12.7.      Recovered ischemic cardiomyopathy  CAD s/p PCI  HTN  HLD  AAA 3 cm  Hold pta plavix and aspirin with bleeding. Continue pta coreg, amlodipine, lisinopril, Lipitor      CKD Baseline creatinine around 1.35 and 1.38 at time of discharge.  Follow up with PCP.     Consultations This Hospital Stay   UROLOGY IP CONSULT  CARE MANAGEMENT / SOCIAL WORK IP CONSULT    Code Status   Full Code    Time Spent on this Encounter   I, RY Figueroa CNP, personally saw the patient today and spent greater than 30 minutes discharging this patient.       RY Figueroa CNP  [facility] OBSERVATION DEPT  [facility] [address] [place] [address]  Phone: [phone]  ______________________________________________________________________    Physical Exam    Vital Signs: Temp: 97.8  F (36.6  C) Temp src: Oral BP: 131/78 Pulse: 77   Resp: 18 SpO2: 95 % O2 Device: None (Room air)    Weight: 201 lbs 9.6 oz    GEN:   Alert, oriented x 3, appears comfortable, NAD.  NECK:   Supple ,no mass or thyromegaly   HEENT:  Normocephalic/atraumatic, no scleral icterus, no nasal discharge, mouth moist.  CV:   Regular rate and rhythm, no murmur or JVD.  S1 + S2 noted, no S3 or S4.  LUNGS:   Clear to auscultation bilaterally without rales/rhonchi/wheezing/retractions.  Symmetric chest rise on inhalation noted.  ABD:   Active bowel sounds, soft, non-tender/non-distended.  No rebound/guarding/rigidity. Clear urine.   EXT:   No edema.  No cyanosis.  No joint synovitis noted.  SKIN:   Dry to touch, no exanthems noted in the visualized areas.  Neurologic: Grossly intact,non focal.   Neuropsychiatric:  General: normal, calm and normal eye contact  Level of consciousness: alert / normal  Affect: normal  Orientation: oriented to self, place, time and situation        Primary Care Physician   Gabriel Leon    Discharge Orders      Reason for your hospital stay    Gross hematuria  Bladder tumor, s/p resection  S/P repeat cystoscopy and bladder fulguration   Mild hyponatremia  Normocytic anemia  Cardiomyopathy  CAD, s/p PCI  CKD stage III  Essential HTN  Dyslipidemia  AAA    Consults:  Urology -- Dr. Stevenson   Procedures:  Cystoscopy.     Activity    Your activity upon discharge: activity as tolerated and no driving while on analgesics.     Follow Up    Call your Cardiologist on Monday, 7/21/25 to ask about resuming aspirin and clopidogrel.  These are being held given your hematuria.      Follow up with Dr. Stevenson as directed.     Discharge Instructions    WHAT TO EXPECT    Blood in the urine, burning on urination, increased urgency and frequency to urinate are normal.  These symptoms can occur for several days and will gradually improve.  ACTIVITY INSTRUCTIONS    Anesthesia takes 24 hours to  wear off. No driving/operating heavy machinery during this time.    Please REST and take it easy after surgery. Anesthesia can make you feel light headed and dizzy.     It is ok to shower the day after surgery.    Increase your daily activity and exercise gradually as tolerated.    Avoid strenuous activity or heavy lifting (greater than 10-15 pounds) until blood in urine clears.    Please consult your doctor on when you can resume sexual activity.  DIET INSTRUCTIONS    Eat a light meal after surgery as anesthesia can make you nauseous. Nothing greasy or spicy.    Resume your normal diet as tolerated.    Increase your fluid intake and hydration (preferably water) over the next week. This will help   clear up your urine quicker and prevent blood clots.  MEDICATIONS    Take Tylenol (500mg every 6 hours) with Ibuprofen (Motrin, Advil etc.) 600mg every 6 hours) as   needed. It is safe to take these medications together or alternate every 3 hours.  CONTACT YOUR DOCTOR'S OFFICE IMMEDIATELY IF YOU EXPERIENCE THE FOLLOWIN. Fever greater than 100.6F, severe chills, continued nausea or vomiting  2. Severe pain that doesn't go away with pain medication  3. Increasing amount of blood in the urine or very large blood clots  4. Inability to urinate    FOLLOW UP    Call your doctor's office to schedule a follow-up appointment (if not already scheduled).     Diet    Follow this diet upon discharge: Current Diet:Orders Placed This Encounter      Regular Diet Adult     Hospital Follow-up with Existing Primary Care Provider (PCP)            Significant Results and Procedures   Most Recent 3 CBC's:  Recent Labs   Lab Test 25  0535 25  2214 25  1147 25  1121   WBC 13.8*  --  13.1* 10.1   HGB 12.7*  12.7* 12.7* 12.8* 14.4   MCV 90  90 89 88 89     --  326 310     Most Recent 3 BMP's:  Recent Labs   Lab Test 25  0535 25  1147 25  1121    132* 139   POTASSIUM 5.0 4.1 4.8    CHLORIDE 101 99 104   CO2 22 22 24   BUN 20.0 23.0 25.6*   CR 1.38* 1.19* 1.36*   ANIONGAP 12 11 11   JONELLE 9.1 9.1 10.3   * 106* 99     Most Recent 2 LFT's:  Recent Labs   Lab Test 03/04/25  1354 07/21/24  0648   AST  --  20   ALT 28 20   ALKPHOS  --  103   BILITOTAL  --  0.8     Most Recent 3 INR's:No lab results found.  7-Day Micro Results       Collected Updated Procedure Result Status      07/18/2025 1027 07/19/2025 0826 Urine Culture [[id number]]   Urine, Midstream    Final result Component Value   Culture <10,000 CFU/mL Urogenital chelsie                     Most Recent 6 glucoses:  Recent Labs   Lab Test 07/19/25  0535 07/18/25  1147 06/20/25  1121 03/04/25  1354 03/01/25  1048 07/22/24  0634   * 106* 99 88 107* 103*     Most Recent Urinalysis:  Recent Labs   Lab Test 07/18/25  1027 06/18/25  1326   COLOR Red* Yellow   APPEARANCE Cloudy* Clear   URINEGLC  --  Negative   URINEBILI  --  Negative   URINEKETONE  --  Negative   SG  --  1.015   UBLD Large* Large*   URINEPH  --  5.5   PROTEIN  --  Negative   UROBILINOGEN  --  0.2   NITRITE  --  Negative   LEUKEST  --  Trace*   RBCU >182*  --    WBCU 23*  --          Discharge Medications      Review of your medicines        PAUSE taking these medications        Dose / Directions   aspirin 81 MG chewable tablet  Wait to take this until your doctor or other care provider tells you to start again.  Commonly known as: ASA      Dose: 81 mg  Take 81 mg by mouth daily.  Refills: 0     clopidogrel 75 MG tablet  Wait to take this until your doctor or other care provider tells you to start again.  Commonly known as: PLAVIX  Used for: Coronary artery disease involving native coronary artery of native heart without angina pectoris, Ischemic cardiomyopathy      Dose: 75 mg  Take 1 tablet (75 mg) by mouth daily.  Quantity: 90 tablet  Refills: 3            START taking        Dose / Directions   acetaminophen 325 MG tablet  Commonly known as: TYLENOL  Used for:  Gross hematuria      Dose: 650 mg  Take 2 tablets (650 mg) by mouth every 4 hours as needed for mild pain or other (and adjunct with moderate or severe pain or per patient request).  Refills: 0     HYDROmorphone 2 MG tablet  Commonly known as: DILAUDID  Used for: Gross hematuria, Malignant neoplasm of urinary bladder, unspecified site (H)      Dose: 1 mg  Take 0.5 tablets (1 mg) by mouth every 3 hours as needed for moderate to severe pain.  Quantity: 10 tablet  Refills: 0     opium-belladonna 30-16.2 MG per suppository  Commonly known as: B&O SUPPRETTES  Used for: Gross hematuria, Malignant neoplasm of urinary bladder, unspecified site (H)      Dose: 15 mg  Place 0.5 suppositories rectally every 6 hours as needed for bladder spasms (breakthrough spasms).  Quantity: 4 suppository  Refills: 0            CONTINUE these medicines which have NOT CHANGED        Dose / Directions   amLODIPine 2.5 MG tablet  Commonly known as: NORVASC  Used for: Benign essential hypertension      Dose: 2.5 mg  Take 1 tablet (2.5 mg) by mouth at bedtime.  Quantity: 90 tablet  Refills: 3     atorvastatin 80 MG tablet  Commonly known as: LIPITOR  Used for: Coronary artery disease involving native coronary artery of native heart without angina pectoris      Dose: 80 mg  Take 1 tablet (80 mg) by mouth daily.  Quantity: 90 tablet  Refills: 3     carvedilol 12.5 MG tablet  Commonly known as: COREG  Used for: Coronary artery disease involving native coronary artery of native heart without angina pectoris, Abdominal aortic aneurysm (AAA) without rupture, unspecified part      Dose: 12.5 mg  Take 1 tablet (12.5 mg) by mouth 2 times daily (with meals).  Quantity: 180 tablet  Refills: 3     levofloxacin 500 MG tablet  Commonly known as: LEVAQUIN  Used for: Malignant neoplasm of urinary bladder, unspecified site (H)      Take 1 tablet 6 hours post treatment and 1 tablet each AM following each treatment.  Quantity: 12 tablet  Refills: 0     lisinopril 40  MG tablet  Commonly known as: ZESTRIL      Dose: 40 mg  Take 40 mg by mouth daily.  Refills: 0     Multivitamin Adult Tabs      Dose: 1 tablet  Take 1 tablet by mouth daily.  Refills: 0     nitroGLYcerin 0.4 MG sublingual tablet  Commonly known as: NITROSTAT  Used for: Coronary artery disease involving native coronary artery of native heart without angina pectoris      For chest pain place 1 tablet under the tongue every 5 minutes for 3 doses. If symptoms persist 5 minutes after 1st dose call 911.  Quantity: 25 tablet  Refills: 3     oxyBUTYnin ER 5 MG 24 hr tablet  Commonly known as: DITROPAN XL  Used for: Bladder spasm      Dose: 5 mg  Take 1 tablet (5 mg) by mouth daily.  Quantity: 30 tablet  Refills: 0               Where to get your medicines        These medications were sent to [facility] [id number] [facility] [address]      Phone: [phone]   HYDROmorphone 2 MG tablet  opium-belladonna 30-16.2 MG per suppository       Some of these will need a paper prescription and others can be bought over the counter. Ask your nurse if you have questions.    You don't need a prescription for these medications  acetaminophen 325 MG tablet       Allergies   No Known Allergies

## 2025-07-19 NOTE — PLAN OF CARE
Care [id number]  Writer and charge nurse in pt room irrigating multiple times since arriving to unit, multiple small clots visible. Pt complains of his bladder feeling full. Also complains of bladder spasms. Scheduled ditropan given. X4 3,000mL sodium chloride bags hung, minimal output in baig. Output still pink to light red. Urology came to unit to replace catheter with a larger catheter. Pt still having draining issues and still needing to irrigate frequently. Urology requesting pt to come to OR for cytso/clot evacuation, pt went down around 1840. Writer pulled baig prior to leaving unit for the OR.

## 2025-07-19 NOTE — PROGRESS NOTES
Urology    No events postoperatively  His urine has been clear since surgery last night  He feels well    His urine is clear and yellow, 1 hour after having CBI clamped    A/P: Remove Jansen catheter  Discharge home today once he is able to void  He will stay off of the Plavix for now and contact his cardiologist on Monday

## 2025-07-19 NOTE — OP NOTE
SURGEON:  Kaden Alexander MD     PREOPERATIVE DIAGNOSIS: Bladder hematoma     POSTOPERATIVE DIAGNOSIS: Bladder hematoma     PROCEDURE PERFORMED: Cystoscopy, evacuation of bladder hematoma, fulguration of the bladder     ANESTHESIA:  General.     COMPLICATIONS:  None     INDICATIONS FOR PROCEDURE: This is a 69-year-old gentleman with a history of a TURBT.  He restarted his Plavix and had recurrence of hematuria.  On the floor today he has clotted off to catheters and we have been having a difficult time irrigating the catheter.  I recommended we proceed to the operating for cystoscopy and evacuation of bladder hematoma.    DETAILS OF PROCEDURE: The risks and benefits of the procedure were explained in detail the patient and informed consent was obtained.  The patient was brought to the operating room and placed supine on the operating table where he underwent general endotracheal anesthetic.  He was then moved down to the dorsal lithotomy position where he was prepped and draped in the standard sterile fashion.    The procedure began by using the visual obturator to introduce the 26 Wallisian resectoscope sheath into the bladder.  I performed a complete cystoscopy. I Identified each ureteral orifice.  There was hematoma in the bladder.  I used the Urovac  to irrigate out the hematoma.  I looked back inside the bladder and there was a large hematoma adherent to the right side of the bladder where the previous transurethral resection had occurred.  I then used the cold loop to scrape the hematoma off of the bladder sidewall and then I flushed it out again with the Urovac.  Once all hematoma and been removed I thoroughly fulgurated the area of previous resection on the right bladder wall.  Once there was good hemostasis I removed the scope and drained the bladder with a 24 Wallisian three-way Jansen catheter

## 2025-07-19 NOTE — PLAN OF CARE
PRIMARY DIAGNOSIS: Post-Op; Cysto  OUTPATIENT/OBSERVATION GOALS TO BE MET BEFORE DISCHARGE:  1. Stable vital signs Yes  2. Tolerating diet:Yes  3. Pain controlled with oral pain medications:  Yes  4. Positive bowel sounds:  Yes  5. Voiding without difficulty:  CBI; slow rate, light pink color, no clots seen.   6. Able to ambulate:  Not out of bed yet.   7. Provider specific discharge goals met:  Progressing.     Discharge Planner Nurse   Safe discharge environment identified: Yes  Barriers to discharge: Yes       Entered by: Cristine Hernandez RN 07/19/2025    Pt is Aox4, VSS on RA. Reports intermittent spasms that go up to a pain level of 8/10. Denies CP/SOB/Nausea/N/T. Tolerated regular diet and PO meds. Informed about getting up to ambulate, pt agreeable once pain medications have been given. CBI running slow rate, light pink color, no clots seen. Tender per pt. Able to make needs known. Awaiting urology to see.     Notified provider about indwelling baig catheter discussed removal or continued need.  Did provider choose to remove indwelling baig catheter? No, awaiting urology for orders.  Provider's baig indication for keeping indwelling baig catheter: Surgical procedure  Is the catheter for retention? NO    Please review provider order for any additional goals. Nurse to notify provider when observation goals have been met and patient is ready for discharge.    Plan of Care Reviewed With: patient  Overall Patient Progress: improving  Outcome Evaluation: AOx4, VSS on RA. Intermittent spasms, pain management. CBI slow with light pink, no clots seen. Reg diet. HGB stable. Urology following.      Problem: Pain Acute  Goal: Optimal Pain Control and Function  Outcome: Progressing

## 2025-07-19 NOTE — DISCHARGE SUMMARY
Patient's After Visit Summary was reviewed with patient and wife  Patient verbalized understanding of After Visit Summary, recommended follow up and was given an opportunity to ask questions.   Discharge medications sent home with patient/family: YES   Discharged with spouse    Removed IV. Educated on post care after baig removal. Given dilaudid discharge med and script for suppository.     OBSERVATION patient END time: 5900

## 2025-07-19 NOTE — PLAN OF CARE
PRIMARY DIAGNOSIS: Post-Op; Cysto  OUTPATIENT/OBSERVATION GOALS TO BE MET BEFORE DISCHARGE:  1. Stable vital signs Yes  2. Tolerating diet:Yes  3. Pain controlled with oral pain medications:  Yes  4. Positive bowel sounds:  Yes  5. Voiding without difficulty:  Baig removed, awaiting to void   6. Able to ambulate:  Yes, independent  7. Provider specific discharge goals met:  Baig, removed; needing to void post removal    Discharge Planner Nurse   Safe discharge environment identified: Yes  Barriers to discharge: Yes       Entered by: Cristine Hernandez RN 07/19/2025    Pt is Aox4, VSS on RA. Reports intermittent spasms that go up to a pain level of 8/10. PRN tylenol and 2 mg PO dilaudid given. Ambulated pierce. CBI stopped, per provider remove baig and wait for pt to void. If able to do this can discharge home this afternoon. Able to make needs known.     Please review provider order for any additional goals. Nurse to notify provider when observation goals have been met and patient is ready for discharge.    Plan of Care Reviewed With: patient  Overall Patient Progress: improving  Outcome Evaluation: AOx4, VSS on RA. Intermittent spasms. Pain management. CBI stopped, baig removed. Anticipate discharge this afternoon.    Problem: Pain Acute  Goal: Optimal Pain Control and Function  7/19/2025 1159 by Cristine Hernandez RN  Outcome: Progressing  7/19/2025 1038 by Cristine Hernandez RN  Outcome: Progressing  Intervention: Prevent or Manage Pain  Recent Flowsheet Documentation  Taken 7/19/2025 0745 by Cristine Hernandez RN  Medication Review/Management: medications reviewed

## 2025-07-21 ENCOUNTER — TELEPHONE (OUTPATIENT)
Dept: CARDIOLOGY | Facility: CLINIC | Age: 70
End: 2025-07-21
Payer: COMMERCIAL

## 2025-07-21 ENCOUNTER — PATIENT OUTREACH (OUTPATIENT)
Dept: CARE COORDINATION | Facility: CLINIC | Age: 70
End: 2025-07-21
Payer: COMMERCIAL

## 2025-07-21 DIAGNOSIS — I34.0 NONRHEUMATIC MITRAL VALVE REGURGITATION: ICD-10-CM

## 2025-07-21 DIAGNOSIS — I25.10 CORONARY ARTERY DISEASE INVOLVING NATIVE CORONARY ARTERY OF NATIVE HEART WITHOUT ANGINA PECTORIS: Primary | ICD-10-CM

## 2025-07-21 DIAGNOSIS — I25.5 ISCHEMIC CARDIOMYOPATHY: ICD-10-CM

## 2025-07-21 NOTE — PROGRESS NOTES
Connected Care Resource Center: [facility]    Background: Transitional Care Management program identified per system criteria and reviewed by [facility] Care Resource Center team for possible outreach.    Assessment: Upon chart review, CCR Team member will not proceed with patient outreach related to this episode of Transitional Care Management program due to reason below:    Patient has active communication with a nurse, provider or care team for reason of post-hospital follow up plan.  Outreach call by CCRC team not indicated to minimize duplicative efforts.     Plan: Transitional Care Management episode addressed appropriately per reason noted above.      BARBER Hassan  [facility] Care Resource [facility]    *Connected Care Resource Team does NOT follow patient ongoing. Referrals are identified based on internal discharge reports and the outreach is to ensure patient has an understanding of their discharge instructions.

## 2025-07-21 NOTE — TELEPHONE ENCOUNTER
Call from patient to review admission last Friday. He had an episode of hematuria again and had to have a cystoscopy to remove clots and cauterize bleeders.    Patient states he was directed to contact cardiology for a time line to restart his aspirin and plavix.    Patient states he has not been using the plavix much this past year due to his bladder cancer and constant bleeding issues. He would prefer not to restart this because he is starting new cancer treatments in 3 weeks. This involves bladder infusions and a side effect is bleeding.    Patient states he is more concerned about treating and managing the cancer and side effects than worrying about future cardiac issues. He would be willing to restart the ASA at some point. He notes he trusts Dr. Mac's opinion.    Patient also wanted to report that his BP is much better with the new therapy, consistently in the 130's systolic.    Will message Dr. Mac to review

## 2025-07-23 NOTE — TELEPHONE ENCOUNTER
Kraig Mac MD to Mariposa Calderon RN  [facility] Heart Team 2   7/22/25  4:48 PM  He has a PCP appt in 2 days, if he is not having hematuria at that point I would recommend restarting aspirin 81 mg daily thanks    ==============================================    Patient called to inform of the above information. Educated on signs and symptoms of bleeding to watch for and to contact Cardiology with any concerns. Patient appreciates input and verbalizes understanding.

## 2025-07-24 ENCOUNTER — OFFICE VISIT (OUTPATIENT)
Dept: FAMILY MEDICINE | Facility: CLINIC | Age: 70
End: 2025-07-24
Attending: NURSE PRACTITIONER
Payer: COMMERCIAL

## 2025-07-24 VITALS
HEART RATE: 62 BPM | RESPIRATION RATE: 12 BRPM | DIASTOLIC BLOOD PRESSURE: 97 MMHG | BODY MASS INDEX: 30.07 KG/M2 | TEMPERATURE: 98.1 F | HEIGHT: 69 IN | SYSTOLIC BLOOD PRESSURE: 148 MMHG | WEIGHT: 203 LBS | OXYGEN SATURATION: 98 %

## 2025-07-24 DIAGNOSIS — C67.9 MALIGNANT NEOPLASM OF URINARY BLADDER, UNSPECIFIED SITE (H): ICD-10-CM

## 2025-07-24 DIAGNOSIS — R31.0 GROSS HEMATURIA: ICD-10-CM

## 2025-07-24 DIAGNOSIS — I10 ESSENTIAL HYPERTENSION: ICD-10-CM

## 2025-07-24 DIAGNOSIS — Z09 HOSPITAL DISCHARGE FOLLOW-UP: Primary | ICD-10-CM

## 2025-07-24 LAB
ALBUMIN UR-MCNC: 30 MG/DL
ANION GAP SERPL CALCULATED.3IONS-SCNC: 9 MMOL/L (ref 7–15)
APPEARANCE UR: CLEAR
BACTERIA #/AREA URNS HPF: ABNORMAL /HPF
BILIRUB UR QL STRIP: NEGATIVE
BUN SERPL-MCNC: 18 MG/DL (ref 8–23)
CALCIUM SERPL-MCNC: 9.7 MG/DL (ref 8.8–10.4)
CHLORIDE SERPL-SCNC: 103 MMOL/L (ref 98–107)
COLOR UR AUTO: YELLOW
CREAT SERPL-MCNC: 1.23 MG/DL (ref 0.67–1.17)
EGFRCR SERPLBLD CKD-EPI 2021: 63 ML/MIN/1.73M2
ERYTHROCYTE [DISTWIDTH] IN BLOOD BY AUTOMATED COUNT: 12.6 % (ref 10–15)
GLUCOSE SERPL-MCNC: 106 MG/DL (ref 70–99)
GLUCOSE UR STRIP-MCNC: NEGATIVE MG/DL
HCO3 SERPL-SCNC: 27 MMOL/L (ref 22–29)
HCT VFR BLD AUTO: 40.4 % (ref 40–53)
HGB BLD-MCNC: 13.3 G/DL (ref 13.3–17.7)
HGB UR QL STRIP: ABNORMAL
KETONES UR STRIP-MCNC: NEGATIVE MG/DL
LEUKOCYTE ESTERASE UR QL STRIP: ABNORMAL
MCH RBC QN AUTO: 29.4 PG (ref 26.5–33)
MCHC RBC AUTO-ENTMCNC: 32.9 G/DL (ref 31.5–36.5)
MCV RBC AUTO: 89 FL (ref 78–100)
NITRATE UR QL: NEGATIVE
PH UR STRIP: 5.5 [PH] (ref 5–7)
PLATELET # BLD AUTO: 409 10E3/UL (ref 150–450)
POTASSIUM SERPL-SCNC: 4.6 MMOL/L (ref 3.4–5.3)
RBC # BLD AUTO: 4.52 10E6/UL (ref 4.4–5.9)
RBC #/AREA URNS AUTO: ABNORMAL /HPF
SODIUM SERPL-SCNC: 139 MMOL/L (ref 135–145)
SP GR UR STRIP: 1.02 (ref 1–1.03)
UROBILINOGEN UR STRIP-ACNC: 0.2 E.U./DL
WBC # BLD AUTO: 11.5 10E3/UL (ref 4–11)
WBC #/AREA URNS AUTO: >100 /HPF

## 2025-07-24 NOTE — PROGRESS NOTES
"HOSPITAL FOLLOW UP:    Assessment & Plan     (Z09) Hospital discharge follow-up  (primary encounter diagnosis)  Comment: Stable.  No gross hematuria since hospitalization.     (R31.0) Gross hematuria  Comment: Re-evaluate for microscopic hematuria with UA and monitor hgb via CBC today.   Consider resuming ASA 81 mg if stable.   Plan: UA Macroscopic with reflex to Microscopic and         Culture - Lab Collect, CBC with platelets    (C67.9) Malignant neoplasm of urinary bladder, unspecified site (H)  Comment: s/p resection. Following with ONC. BCG schedule 08/2025.    (I10) Essential hypertension  Comment: Reports BP at home 125-130/80. Declines further management today.   Continue 40 mg Lisinopril q day and 2.5 amlodipine q day.   Plan: Basic metabolic panel  (Ca, Cl, CO2, Creat,         Gluc, K, Na, BUN)      The longitudinal plan of care for the diagnosis(es)/condition(s) as documented were addressed during this visit. Due to the added complexity in care, I will continue to support Issac in the subsequent management and with ongoing continuity of care.  MED REC REQUIRED  Post Medication Reconciliation Status: discharge medications reconciled, continue medications without change  BMI  Estimated body mass index is 29.98 kg/m  as calculated from the following:    Height as of this encounter: 1.753 m (5' 9\").    Weight as of this encounter: 92.1 kg (203 lb).   Weight management plan: Discussed healthy diet and exercise guidelines    Follow-up PRN      Subjective   Issac is a 69 year old, presenting for the following health issues:  Hospital F/U        7/24/2025     7:41 AM   Additional Questions   Roomed by Katarzyna Parker MA     HPI      History of Present Illness-  Issac Jackson, 69-year-old male  - Hospitalized recently for hematuria and underwent a surgical procedure in early June 2025  - Experienced bleeding and clot after catheter removal on Saturday morning prior to this visit; no further visible blood in urine since " then  - Used six catheters post-surgery; reports soreness and pain, especially with urination, but has not used pain medication since Saturday morning  - Blood pressure at home prior to hematuria was 128/78 mmHg; in hospital, blood pressure was 130/80 mmHg; goal is 130/80 mmHg  - Started on amlodipine 5 mg in March 2025, dose reduced to 2.5 mg due to low blood pressure (110/70 mmHg)  - No appetite and poor oral intake since first surgery; eating only what tastes good in recent days  - No depression reported, but acknowledges a difficult past two months due to personal and family health events  - No swelling in legs except for one episode during a plane ride a year ago  - No fever or abdominal pain reported      Hospital Follow-up Visit:    Hospital/Nursing Home/IP Rehab Facility: [facility]  Most Recent Admission Date: 7/18/2025   Most Recent Admission Diagnosis: Gross hematuria - R31.0  Anemia due to blood loss, acute - D62     Most Recent Discharge Date: 7/19/2025   Most Recent Discharge Diagnosis: Gross hematuria - R31.0  Anemia due to blood loss, acute - D62  Malignant neoplasm of urinary bladder, unspecified site (H) - C67.9   Do you have any other stressors you would like to discuss with your provider? No    Problems taking medications regularly:  None  Medication changes since discharge: Restarting aspirin in a few days. Needs to consult Cardio first.  Problems adhering to non-medication therapy:  None    Summary of hospitalization:  [facility] discharge summary reviewed  Diagnostic Tests/Treatments reviewed.  Follow up needed: none  Other Healthcare Providers Involved in Patient s Care:         None  Update since discharge: improved.         Plan of care communicated with patient                 Review of Systems  Constitutional, HEENT, cardiovascular, pulmonary, gi and gu systems are negative, except as otherwise noted.      Objective    BP (!) 148/97 (BP Location:  "Right arm, Patient Position: Sitting, Cuff Size: Adult Regular)   Pulse 62   Temp 98.1  F (36.7  C) (Oral)   Resp 12   Ht 1.753 m (5' 9\")   Wt 92.1 kg (203 lb)   SpO2 98%   BMI 29.98 kg/m    Body mass index is 29.98 kg/m .  Physical Exam   GENERAL: alert and no distress  NECK: no adenopathy, no asymmetry, masses, or scars  RESP: lungs clear to auscultation - no rales, rhonchi or wheezes  CV: regular rate and rhythm, normal S1 S2, no S3 or S4, no murmur, click or rub, no peripheral edema  ABDOMEN: soft, nontender, no hepatosplenomegaly, no masses and bowel sounds normal  MS: no gross musculoskeletal defects noted, no edema              Signed Electronically by: RY Robert CNP    "

## 2025-07-27 ENCOUNTER — TELEPHONE (OUTPATIENT)
Dept: UROLOGY | Facility: CLINIC | Age: 70
End: 2025-07-27
Payer: COMMERCIAL

## 2025-07-27 LAB — BACTERIA UR CULT: ABNORMAL

## 2025-07-27 NOTE — TELEPHONE ENCOUNTER
"Patient asking about urine culture result.    Urine Culture  Order: [id number]   Collected 7/24/2025  8:30 AM       Status: Final result       Dx: Gross hematuria    Test Result Released: Yes (seen)    Specimen Information: Urine, Midstream   0 Result Notes       View Follow-Up Encounter  Culture >100,000 CFU/mL Staphylococcus epidermidis Abnormal         Resulting Agency: [facility]     Susceptibility     Staphylococcus epidermidis     JAVI     Ciprofloxacin <=0.5 ug/mL Susceptible     Daptomycin 0.5 ug/mL Susceptible     Doxycycline 8 ug/mL Intermediate     Gentamicin <=0.5 ug/mL Susceptible     Levofloxacin <=0.12 ug/mL Susceptible     Nitrofurantoin <=16 ug/mL Susceptible     Oxacillin >=4 ug/mL Resistant 1     Tetracycline >=16 ug/mL Resistant     Trimethoprim/Sulfamethoxazole  Susceptible     Vancomycin 2 ug/mL Susceptible              1 Oxacillin susceptible isolates are susceptible to cephalosporins (example: cefazolin and cephalexin) and beta lactam combination agents. Oxacillin resistant isolates are resistant to these agents.       Susceptibility Comments    Staphylococcus epidermidis   Antibiotics listed as \"No Interpretation\" have no regulatory guidelines for susceptibility/resistance available.           Patient is already on ciprofloxacin. He is asking if he needs to switch antibiotic    He should continue cipro as prescribed given that the urine culture is sensitive    Satya Huerta MD   [facility]  [phone] clinic phone          "

## 2025-07-29 ENCOUNTER — TELEPHONE (OUTPATIENT)
Dept: UROLOGY | Facility: CLINIC | Age: 70
End: 2025-07-29
Payer: COMMERCIAL

## 2025-07-29 NOTE — TELEPHONE ENCOUNTER
[facility] Call Center    Phone Message    May a detailed message be left on voicemail: no     Reason for Call: Other: Patient called and stated that he had hematuria and an infection last week and was wondering if he will need follow up treatment for his BCG. Please call patient to discuss.     Action Taken: Other: [facility]    Travel Screening: Not Applicable     Date of Service:

## 2025-08-05 ENCOUNTER — MYC MEDICAL ADVICE (OUTPATIENT)
Dept: FAMILY MEDICINE | Facility: CLINIC | Age: 70
End: 2025-08-05
Payer: COMMERCIAL

## 2025-08-14 ENCOUNTER — INFUSION THERAPY VISIT (OUTPATIENT)
Dept: INFUSION THERAPY | Facility: CLINIC | Age: 70
End: 2025-08-14
Attending: UROLOGY
Payer: COMMERCIAL

## 2025-08-14 VITALS
HEART RATE: 65 BPM | DIASTOLIC BLOOD PRESSURE: 112 MMHG | TEMPERATURE: 97.9 F | RESPIRATION RATE: 16 BRPM | OXYGEN SATURATION: 96 % | SYSTOLIC BLOOD PRESSURE: 174 MMHG

## 2025-08-14 DIAGNOSIS — C67.9 MALIGNANT NEOPLASM OF URINARY BLADDER, UNSPECIFIED SITE (H): Primary | ICD-10-CM

## 2025-08-14 LAB
ALBUMIN UR-MCNC: NEGATIVE MG/DL
APPEARANCE UR: CLEAR
BILIRUB UR QL STRIP: NEGATIVE
COLOR UR AUTO: YELLOW
GLUCOSE UR STRIP-MCNC: NEGATIVE MG/DL
HGB UR QL STRIP: ABNORMAL
KETONES UR STRIP-MCNC: NEGATIVE MG/DL
LEUKOCYTE ESTERASE UR QL STRIP: ABNORMAL
NITRATE UR QL: POSITIVE
PH UR STRIP: 5.5 [PH] (ref 5–7)
SP GR UR STRIP: 1.02 (ref 1–1.03)
UROBILINOGEN UR STRIP-MCNC: NORMAL MG/DL

## 2025-08-14 PROCEDURE — 81003 URINALYSIS AUTO W/O SCOPE: CPT | Performed by: UROLOGY

## 2025-08-14 PROCEDURE — 87186 SC STD MICRODIL/AGAR DIL: CPT | Performed by: UROLOGY

## 2025-08-16 LAB — BACTERIA UR CULT: ABNORMAL

## 2025-08-18 ENCOUNTER — TELEPHONE (OUTPATIENT)
Dept: UROLOGY | Facility: CLINIC | Age: 70
End: 2025-08-18
Payer: COMMERCIAL

## 2025-08-18 DIAGNOSIS — N39.0 URINARY TRACT INFECTION: Primary | ICD-10-CM

## 2025-08-18 RX ORDER — NITROFURANTOIN 25; 75 MG/1; MG/1
100 CAPSULE ORAL 2 TIMES DAILY
Qty: 10 CAPSULE | Refills: 0 | Status: SHIPPED | OUTPATIENT
Start: 2025-08-18 | End: 2025-08-23

## 2025-08-21 DIAGNOSIS — N39.0 URINARY TRACT INFECTION: Primary | ICD-10-CM

## 2025-08-21 RX ORDER — NITROFURANTOIN 25; 75 MG/1; MG/1
100 CAPSULE ORAL 2 TIMES DAILY
Qty: 9 CAPSULE | Refills: 0 | Status: SHIPPED | OUTPATIENT
Start: 2025-08-21

## 2025-08-25 ENCOUNTER — LAB (OUTPATIENT)
Dept: LAB | Facility: CLINIC | Age: 70
End: 2025-08-25
Payer: COMMERCIAL

## 2025-08-27 ENCOUNTER — TELEPHONE (OUTPATIENT)
Dept: CARDIOLOGY | Facility: CLINIC | Age: 70
End: 2025-08-27
Payer: COMMERCIAL

## 2025-08-28 ENCOUNTER — INFUSION THERAPY VISIT (OUTPATIENT)
Dept: INFUSION THERAPY | Facility: CLINIC | Age: 70
End: 2025-08-28
Attending: UROLOGY
Payer: COMMERCIAL

## 2025-08-28 VITALS
RESPIRATION RATE: 16 BRPM | HEART RATE: 62 BPM | DIASTOLIC BLOOD PRESSURE: 94 MMHG | OXYGEN SATURATION: 96 % | SYSTOLIC BLOOD PRESSURE: 163 MMHG | TEMPERATURE: 98.2 F | WEIGHT: 200 LBS | BODY MASS INDEX: 29.62 KG/M2 | HEIGHT: 69 IN

## 2025-08-28 DIAGNOSIS — C67.9 MALIGNANT NEOPLASM OF URINARY BLADDER, UNSPECIFIED SITE (H): Primary | ICD-10-CM

## 2025-08-28 LAB
ALBUMIN UR-MCNC: NEGATIVE MG/DL
APPEARANCE UR: CLEAR
BILIRUB UR QL STRIP: NEGATIVE
COLOR UR AUTO: YELLOW
GLUCOSE UR STRIP-MCNC: NEGATIVE MG/DL
HGB UR QL STRIP: ABNORMAL
KETONES UR STRIP-MCNC: NEGATIVE MG/DL
LEUKOCYTE ESTERASE UR QL STRIP: ABNORMAL
NITRATE UR QL: NEGATIVE
PH UR STRIP: 5.5 [PH] (ref 5–7)
SP GR UR STRIP: 1.02 (ref 1–1.03)
UROBILINOGEN UR STRIP-MCNC: NORMAL MG/DL

## 2025-08-28 PROCEDURE — 250N000009 HC RX 250: Performed by: UROLOGY

## 2025-08-28 PROCEDURE — 81003 URINALYSIS AUTO W/O SCOPE: CPT | Performed by: UROLOGY

## 2025-08-28 PROCEDURE — 250N000011 HC RX IP 250 OP 636: Performed by: UROLOGY

## 2025-08-28 RX ORDER — LIDOCAINE HYDROCHLORIDE 20 MG/ML
10 JELLY TOPICAL
Status: DISCONTINUED | OUTPATIENT
Start: 2025-08-28 | End: 2025-08-28 | Stop reason: HOSPADM

## 2025-08-28 RX ADMIN — BACILLUS CALMETTE-GUERIN 50 MG: 50 POWDER, FOR SUSPENSION INTRAVESICAL at 08:46

## 2025-08-28 RX ADMIN — LIDOCAINE HYDROCHLORIDE 10 ML: 20 JELLY TOPICAL at 08:29

## 2025-09-04 ENCOUNTER — INFUSION THERAPY VISIT (OUTPATIENT)
Dept: INFUSION THERAPY | Facility: CLINIC | Age: 70
End: 2025-09-04
Attending: UROLOGY
Payer: COMMERCIAL

## 2025-09-04 VITALS
OXYGEN SATURATION: 97 % | RESPIRATION RATE: 16 BRPM | TEMPERATURE: 97.7 F | SYSTOLIC BLOOD PRESSURE: 134 MMHG | HEART RATE: 64 BPM | DIASTOLIC BLOOD PRESSURE: 88 MMHG

## 2025-09-04 DIAGNOSIS — C67.9 MALIGNANT NEOPLASM OF URINARY BLADDER, UNSPECIFIED SITE (H): Primary | ICD-10-CM

## 2025-09-04 LAB
ALBUMIN UR-MCNC: NEGATIVE MG/DL
APPEARANCE UR: CLEAR
BILIRUB UR QL STRIP: NEGATIVE
COLOR UR AUTO: ABNORMAL
GLUCOSE UR STRIP-MCNC: NEGATIVE MG/DL
HGB UR QL STRIP: NEGATIVE
KETONES UR STRIP-MCNC: NEGATIVE MG/DL
LEUKOCYTE ESTERASE UR QL STRIP: ABNORMAL
NITRATE UR QL: NEGATIVE
PH UR STRIP: 5 [PH] (ref 5–7)
SP GR UR STRIP: 1.02 (ref 1–1.03)
UROBILINOGEN UR STRIP-MCNC: NORMAL MG/DL

## 2025-09-04 PROCEDURE — 250N000009 HC RX 250: Performed by: UROLOGY

## 2025-09-04 PROCEDURE — 250N000011 HC RX IP 250 OP 636: Performed by: UROLOGY

## 2025-09-04 RX ORDER — LIDOCAINE HYDROCHLORIDE 20 MG/ML
10 JELLY TOPICAL
Status: DISCONTINUED | OUTPATIENT
Start: 2025-09-04 | End: 2025-09-04 | Stop reason: HOSPADM

## 2025-09-04 RX ADMIN — LIDOCAINE HYDROCHLORIDE 10 ML: 20 JELLY TOPICAL at 08:26

## 2025-09-04 RX ADMIN — BACILLUS CALMETTE-GUERIN 50 MG: 50 POWDER, FOR SUSPENSION INTRAVESICAL at 08:41

## 2025-09-04 ASSESSMENT — PAIN SCALES - GENERAL: PAINLEVEL_OUTOF10: NO PAIN (0)

## (undated) DEVICE — BAG URINARY LEG 570ML

## (undated) DEVICE — Device

## (undated) DEVICE — UNDERPAD 36X30 PREMIERPRO MAX ABS NS LF 676111

## (undated) DEVICE — SOLUTION IV WATER 3L HYPOTONIC R8006

## (undated) DEVICE — EVACUATOR BLADDER UROVAC LATEX M0067301250

## (undated) DEVICE — TUBING IRR LG BORE TUBE DRIP CHMBR 2 BG 94IN 313003

## (undated) DEVICE — LINEN FULL SHEET 5511

## (undated) DEVICE — BAG URINARY DRAIN 4000ML LF 153509

## (undated) DEVICE — ESU GROUND PAD ADULT W/CORD E7507

## (undated) DEVICE — PACK CYSTO CUSTOM RIDGES

## (undated) DEVICE — BAG CLEAR TRASH 1.3M 39X33" P4040C

## (undated) DEVICE — DRSG TELFA 2X3"

## (undated) DEVICE — ESU ELEC LOOP 24FR 20750G

## (undated) DEVICE — CATH FOLEY 3WAY 24FR 30ML LUBRICATH LATEX 0167L24

## (undated) DEVICE — SYR ELLIK EVACUATOR W/ADAPT LATEX

## (undated) DEVICE — LINEN HALF SHEET 5512

## (undated) DEVICE — CATH FOLEY COUDE TIEMAN 20FR 5ML LUBRICATH LATEX 0102L20

## (undated) DEVICE — CONTRAST ISOVUE 300 61% IOPAMIDOL 10X30ML VIAL 131525

## (undated) DEVICE — PREP SCRUB SOL EXIDINE 4% CHG 4OZ 29002-404

## (undated) DEVICE — SOLUTION WATER 1000ML BOTTLE R5000-01

## (undated) DEVICE — CATH FOLEY 3WAY 22FR 30ML LUBRICATH LATEX 0167L22

## (undated) DEVICE — PREP DYNA-HEX 4% CHG SCRUB 4OZ BOTTLE MDS098710

## (undated) DEVICE — PAD CHUX UNDERPAD 30X36" P3036C

## (undated) DEVICE — GLOVE BIOGEL PI ULTRATOUCH SZ 7.5 41175

## (undated) DEVICE — SOL WATER IRRIG 1000ML BOTTLE 2F7114

## (undated) DEVICE — SYR 50ML CATH TIP W/O NDL 309620

## (undated) DEVICE — CATH URETERAL FLEX TIP TIGERTAIL 06FRX70CM 139006

## (undated) DEVICE — BASIN SET MINOR DISP

## (undated) DEVICE — GUIDEWIRE URO STR STIFF .035"X150CM NITINOL 150NSS35

## (undated) DEVICE — GLOVE PROTEXIS W/NEU-THERA 7.0  2D73TE70

## (undated) DEVICE — TUBING IRRIG TUR Y TYPE 96" LF 6543-01

## (undated) DEVICE — ESU CORD MONOPOLAR 10'  E0510

## (undated) DEVICE — RAD RX ISOVUE 300 (50ML) 61% IOPAMIDOL CHARGE PER ML

## (undated) RX ORDER — HYDRALAZINE HYDROCHLORIDE 20 MG/ML
INJECTION INTRAMUSCULAR; INTRAVENOUS
Status: DISPENSED
Start: 2025-06-04

## (undated) RX ORDER — FENTANYL CITRATE-0.9 % NACL/PF 10 MCG/ML
PLASTIC BAG, INJECTION (ML) INTRAVENOUS
Status: DISPENSED
Start: 2025-07-07

## (undated) RX ORDER — FENTANYL CITRATE 50 UG/ML
INJECTION, SOLUTION INTRAMUSCULAR; INTRAVENOUS
Status: DISPENSED
Start: 2025-06-04

## (undated) RX ORDER — EPHEDRINE SULFATE 50 MG/ML
INJECTION, SOLUTION INTRAMUSCULAR; INTRAVENOUS; SUBCUTANEOUS
Status: DISPENSED
Start: 2025-07-18

## (undated) RX ORDER — DEXAMETHASONE SODIUM PHOSPHATE 4 MG/ML
INJECTION, SOLUTION INTRA-ARTICULAR; INTRALESIONAL; INTRAMUSCULAR; INTRAVENOUS; SOFT TISSUE
Status: DISPENSED
Start: 2025-06-04

## (undated) RX ORDER — DEXAMETHASONE SODIUM PHOSPHATE 4 MG/ML
INJECTION, SOLUTION INTRA-ARTICULAR; INTRALESIONAL; INTRAMUSCULAR; INTRAVENOUS; SOFT TISSUE
Status: DISPENSED
Start: 2025-07-18

## (undated) RX ORDER — PROPOFOL 10 MG/ML
INJECTION, EMULSION INTRAVENOUS
Status: DISPENSED
Start: 2024-07-21

## (undated) RX ORDER — CEFAZOLIN SODIUM/WATER 2 G/20 ML
SYRINGE (ML) INTRAVENOUS
Status: DISPENSED
Start: 2025-07-07

## (undated) RX ORDER — GLYCOPYRROLATE 0.2 MG/ML
INJECTION, SOLUTION INTRAMUSCULAR; INTRAVENOUS
Status: DISPENSED
Start: 2024-07-21

## (undated) RX ORDER — EPHEDRINE SULFATE 50 MG/ML
INJECTION, SOLUTION INTRAMUSCULAR; INTRAVENOUS; SUBCUTANEOUS
Status: DISPENSED
Start: 2024-07-21

## (undated) RX ORDER — PROPOFOL 10 MG/ML
INJECTION, EMULSION INTRAVENOUS
Status: DISPENSED
Start: 2025-07-18

## (undated) RX ORDER — LIDOCAINE HYDROCHLORIDE 10 MG/ML
INJECTION, SOLUTION EPIDURAL; INFILTRATION; INTRACAUDAL; PERINEURAL
Status: DISPENSED
Start: 2024-07-21

## (undated) RX ORDER — CEFAZOLIN SODIUM/WATER 2 G/20 ML
SYRINGE (ML) INTRAVENOUS
Status: DISPENSED
Start: 2025-06-04

## (undated) RX ORDER — LIDOCAINE HYDROCHLORIDE AND EPINEPHRINE 10; 10 MG/ML; UG/ML
INJECTION, SOLUTION INFILTRATION; PERINEURAL
Status: DISPENSED
Start: 2023-01-03

## (undated) RX ORDER — DEXAMETHASONE SODIUM PHOSPHATE 4 MG/ML
INJECTION, SOLUTION INTRA-ARTICULAR; INTRALESIONAL; INTRAMUSCULAR; INTRAVENOUS; SOFT TISSUE
Status: DISPENSED
Start: 2024-07-21

## (undated) RX ORDER — ONDANSETRON 2 MG/ML
INJECTION INTRAMUSCULAR; INTRAVENOUS
Status: DISPENSED
Start: 2025-06-04

## (undated) RX ORDER — ACETAMINOPHEN 325 MG/1
TABLET ORAL
Status: DISPENSED
Start: 2025-06-04

## (undated) RX ORDER — ONDANSETRON 2 MG/ML
INJECTION INTRAMUSCULAR; INTRAVENOUS
Status: DISPENSED
Start: 2025-07-18

## (undated) RX ORDER — ONDANSETRON 2 MG/ML
INJECTION INTRAMUSCULAR; INTRAVENOUS
Status: DISPENSED
Start: 2024-07-21

## (undated) RX ORDER — GLYCOPYRROLATE 0.2 MG/ML
INJECTION, SOLUTION INTRAMUSCULAR; INTRAVENOUS
Status: DISPENSED
Start: 2025-07-18

## (undated) RX ORDER — FENTANYL CITRATE 50 UG/ML
INJECTION, SOLUTION INTRAMUSCULAR; INTRAVENOUS
Status: DISPENSED
Start: 2025-07-18

## (undated) RX ORDER — FENTANYL CITRATE 50 UG/ML
INJECTION, SOLUTION INTRAMUSCULAR; INTRAVENOUS
Status: DISPENSED
Start: 2024-07-21

## (undated) RX ORDER — CEFAZOLIN SODIUM/WATER 2 G/20 ML
SYRINGE (ML) INTRAVENOUS
Status: DISPENSED
Start: 2025-07-18

## (undated) RX ORDER — ACETAMINOPHEN 325 MG/1
TABLET ORAL
Status: DISPENSED
Start: 2025-07-18

## (undated) RX ORDER — LIDOCAINE HYDROCHLORIDE 10 MG/ML
INJECTION, SOLUTION EPIDURAL; INFILTRATION; INTRACAUDAL; PERINEURAL
Status: DISPENSED
Start: 2025-07-18

## (undated) RX ORDER — LIDOCAINE HYDROCHLORIDE 10 MG/ML
INJECTION, SOLUTION EPIDURAL; INFILTRATION; INTRACAUDAL; PERINEURAL
Status: DISPENSED
Start: 2025-06-04

## (undated) RX ORDER — LABETALOL HYDROCHLORIDE 5 MG/ML
INJECTION, SOLUTION INTRAVENOUS
Status: DISPENSED
Start: 2025-07-07

## (undated) RX ORDER — PROPOFOL 10 MG/ML
INJECTION, EMULSION INTRAVENOUS
Status: DISPENSED
Start: 2025-06-04

## (undated) RX ORDER — CEFAZOLIN SODIUM 1 G/3ML
INJECTION, POWDER, FOR SOLUTION INTRAMUSCULAR; INTRAVENOUS
Status: DISPENSED
Start: 2024-07-21

## (undated) RX ORDER — ACETAMINOPHEN 325 MG/1
TABLET ORAL
Status: DISPENSED
Start: 2025-07-07

## (undated) RX ORDER — FENTANYL CITRATE 50 UG/ML
INJECTION, SOLUTION INTRAMUSCULAR; INTRAVENOUS
Status: DISPENSED
Start: 2025-07-07